# Patient Record
Sex: FEMALE | Race: WHITE | NOT HISPANIC OR LATINO | Employment: OTHER | URBAN - METROPOLITAN AREA
[De-identification: names, ages, dates, MRNs, and addresses within clinical notes are randomized per-mention and may not be internally consistent; named-entity substitution may affect disease eponyms.]

---

## 2019-06-12 ENCOUNTER — OFFICE VISIT (OUTPATIENT)
Dept: INTERNAL MEDICINE | Facility: CLINIC | Age: 54
End: 2019-06-12

## 2019-06-12 VITALS — HEIGHT: 66 IN | WEIGHT: 167 LBS | BODY MASS INDEX: 26.84 KG/M2

## 2019-06-12 DIAGNOSIS — L24.7 IRRITANT CONTACT DERMATITIS DUE TO PLANTS, EXCEPT FOOD: Primary | ICD-10-CM

## 2019-06-12 PROCEDURE — 99212 OFFICE O/P EST SF 10 MIN: CPT | Performed by: PHYSICIAN ASSISTANT

## 2019-06-12 RX ORDER — TRAZODONE HYDROCHLORIDE 50 MG/1
TABLET ORAL
Refills: 5 | COMMUNITY
Start: 2019-04-15 | End: 2019-07-22

## 2019-06-12 RX ORDER — BUPROPION HYDROCHLORIDE 300 MG/1
300 TABLET ORAL DAILY
Refills: 3 | COMMUNITY
Start: 2019-05-23 | End: 2019-10-23 | Stop reason: SDUPTHER

## 2019-06-12 RX ORDER — OMEPRAZOLE 20 MG/1
CAPSULE, DELAYED RELEASE ORAL
Refills: 5 | COMMUNITY
Start: 2019-05-23 | End: 2019-07-08 | Stop reason: SDUPTHER

## 2019-06-12 RX ORDER — AMLODIPINE BESYLATE 5 MG/1
TABLET ORAL
Refills: 5 | COMMUNITY
Start: 2019-05-23 | End: 2019-09-30 | Stop reason: SDUPTHER

## 2019-06-12 RX ORDER — METHYLPREDNISOLONE 4 MG/1
TABLET ORAL
Qty: 21 TABLET | Refills: 0 | Status: SHIPPED | OUTPATIENT
Start: 2019-06-12 | End: 2019-07-22

## 2019-06-12 RX ORDER — HYDROCHLOROTHIAZIDE 12.5 MG/1
CAPSULE, GELATIN COATED ORAL
Refills: 5 | COMMUNITY
Start: 2019-06-03 | End: 2019-07-22

## 2019-06-12 RX ORDER — TRIAMCINOLONE ACETONIDE 5 MG/G
OINTMENT TOPICAL 3 TIMES DAILY
Qty: 1 TUBE | Refills: 0 | Status: SHIPPED | OUTPATIENT
Start: 2019-06-12 | End: 2019-07-22

## 2019-06-12 NOTE — PROGRESS NOTES
Subjective   Chief Complaint   Patient presents with   • Rash     both legs       Pt here today with rash for the last few days lower legs, new spots popping up. Itching horribly, has used otc cortizone cream very little improvement. Was outside working in her yard a few days ago prior to rash erruption.           There is no problem list on file for this patient.      No Known Allergies    Current Outpatient Medications on File Prior to Visit   Medication Sig Dispense Refill   • amLODIPine (NORVASC) 5 MG tablet TK 1 T PO D UTD  5   • buPROPion XL (WELLBUTRIN XL) 300 MG 24 hr tablet Take 300 mg by mouth Daily.  3   • hydrochlorothiazide (MICROZIDE) 12.5 MG capsule TK 1 C PO QD  5   • omeprazole (priLOSEC) 20 MG capsule TK ONE C PO  QD  5   • traZODone (DESYREL) 50 MG tablet TK 1 TO 2 TS PO HS PRN  5     No current facility-administered medications on file prior to visit.        No past medical history on file.    No family history on file.    Social History     Socioeconomic History   • Marital status:      Spouse name: Not on file   • Number of children: Not on file   • Years of education: Not on file   • Highest education level: Not on file       No past surgical history on file.    PHQ-9 Depression Screening  Little interest or pleasure in doing things?     Feeling down, depressed, or hopeless?     Trouble falling or staying asleep, or sleeping too much?     Feeling tired or having little energy?     Poor appetite or overeating?     Feeling bad about yourself - or that you are a failure or have let yourself or your family down?     Trouble concentrating on things, such as reading the newspaper or watching television?     Moving or speaking so slowly that other people could have noticed? Or the opposite - being so fidgety or restless that you have been moving around a lot more than usual?     Thoughts that you would be better off dead, or of hurting yourself in some way?     PHQ-9 Total Score     If you  "checked off any problems, how difficult have these problems made it for you to do your work, take care of things at home, or get along with other people?       The following portions of the patient's history were reviewed and updated as appropriate: problem list, allergies, current medications and past medical history.    Review of Systems   Skin: Positive for rash.         There is no immunization history on file for this patient.    Objective   Vitals:    06/12/19 1444   Weight: 75.8 kg (167 lb)   Height: 167.6 cm (66\")     Physical Exam   Skin:   vessicular rash on anterior aspect of lower extremities bilaterally.         Assessment/Plan   Mariela was seen today for rash.    Diagnoses and all orders for this visit:    Irritant contact dermatitis due to plants, except food    Other orders  -     methylPREDNISolone (MEDROL, FIDENCIO,) 4 MG tablet; Take as directed on package instructions.  -     triamcinolone (KENALOG) 0.5 % ointment; Apply  topically to the appropriate area as directed 3 (Three) Times a Day.      Likely from poison ivy. Oral steroids and triamcinolone cream for area.          "

## 2019-07-08 RX ORDER — OMEPRAZOLE 20 MG/1
CAPSULE, DELAYED RELEASE ORAL
Qty: 30 CAPSULE | Refills: 0 | Status: SHIPPED | OUTPATIENT
Start: 2019-07-08 | End: 2019-08-12 | Stop reason: SDUPTHER

## 2019-07-22 ENCOUNTER — OFFICE VISIT (OUTPATIENT)
Dept: INTERNAL MEDICINE | Facility: CLINIC | Age: 54
End: 2019-07-22

## 2019-07-22 VITALS
HEIGHT: 66 IN | BODY MASS INDEX: 28.12 KG/M2 | SYSTOLIC BLOOD PRESSURE: 112 MMHG | HEART RATE: 51 BPM | DIASTOLIC BLOOD PRESSURE: 72 MMHG | TEMPERATURE: 97.9 F | WEIGHT: 175 LBS | RESPIRATION RATE: 15 BRPM

## 2019-07-22 DIAGNOSIS — D50.8 OTHER IRON DEFICIENCY ANEMIA: ICD-10-CM

## 2019-07-22 DIAGNOSIS — I10 ESSENTIAL HYPERTENSION: Primary | ICD-10-CM

## 2019-07-22 PROBLEM — D50.9 IRON DEFICIENCY ANEMIA: Status: ACTIVE | Noted: 2019-07-22

## 2019-07-22 LAB
ALBUMIN SERPL-MCNC: 4.3 G/DL (ref 3.5–5.2)
ALBUMIN/GLOB SERPL: 1.7 G/DL
ALP SERPL-CCNC: 32 U/L (ref 39–117)
ALT SERPL-CCNC: 19 U/L (ref 1–33)
AST SERPL-CCNC: 20 U/L (ref 1–32)
BASOPHILS # BLD AUTO: 0.04 10*3/MM3 (ref 0–0.2)
BASOPHILS NFR BLD AUTO: 0.7 % (ref 0–1.5)
BILIRUB SERPL-MCNC: 0.4 MG/DL (ref 0.2–1.2)
BUN SERPL-MCNC: 12 MG/DL (ref 6–20)
BUN/CREAT SERPL: 14.8 (ref 7–25)
CALCIUM SERPL-MCNC: 8.8 MG/DL (ref 8.6–10.5)
CHLORIDE SERPL-SCNC: 105 MMOL/L (ref 98–107)
CO2 SERPL-SCNC: 24.4 MMOL/L (ref 22–29)
CREAT SERPL-MCNC: 0.81 MG/DL (ref 0.57–1)
EOSINOPHIL # BLD AUTO: 0.05 10*3/MM3 (ref 0–0.4)
EOSINOPHIL NFR BLD AUTO: 0.8 % (ref 0.3–6.2)
ERYTHROCYTE [DISTWIDTH] IN BLOOD BY AUTOMATED COUNT: 12.4 % (ref 12.3–15.4)
GLOBULIN SER CALC-MCNC: 2.5 GM/DL
GLUCOSE SERPL-MCNC: 110 MG/DL (ref 65–99)
HCT VFR BLD AUTO: 41.4 % (ref 34–46.6)
HGB BLD-MCNC: 13.8 G/DL (ref 12–15.9)
IMM GRANULOCYTES # BLD AUTO: 0.03 10*3/MM3 (ref 0–0.05)
IMM GRANULOCYTES NFR BLD AUTO: 0.5 % (ref 0–0.5)
LYMPHOCYTES # BLD AUTO: 2.46 10*3/MM3 (ref 0.7–3.1)
LYMPHOCYTES NFR BLD AUTO: 40.9 % (ref 19.6–45.3)
MCH RBC QN AUTO: 30.7 PG (ref 26.6–33)
MCHC RBC AUTO-ENTMCNC: 33.3 G/DL (ref 31.5–35.7)
MCV RBC AUTO: 92.2 FL (ref 79–97)
MONOCYTES # BLD AUTO: 0.63 10*3/MM3 (ref 0.1–0.9)
MONOCYTES NFR BLD AUTO: 10.5 % (ref 5–12)
NEUTROPHILS # BLD AUTO: 2.81 10*3/MM3 (ref 1.7–7)
NEUTROPHILS NFR BLD AUTO: 46.6 % (ref 42.7–76)
NRBC BLD AUTO-RTO: 0 /100 WBC (ref 0–0.2)
PLATELET # BLD AUTO: 218 10*3/MM3 (ref 140–450)
POTASSIUM SERPL-SCNC: 4.4 MMOL/L (ref 3.5–5.2)
PROT SERPL-MCNC: 6.8 G/DL (ref 6–8.5)
RBC # BLD AUTO: 4.49 10*6/MM3 (ref 3.77–5.28)
SODIUM SERPL-SCNC: 141 MMOL/L (ref 136–145)
WBC # BLD AUTO: 6.02 10*3/MM3 (ref 3.4–10.8)

## 2019-07-22 PROCEDURE — 99213 OFFICE O/P EST LOW 20 MIN: CPT | Performed by: INTERNAL MEDICINE

## 2019-07-22 NOTE — PROGRESS NOTES
Subjective        Chief Complaint   Patient presents with   • Med Management           Mariela Polanco is a 53 y.o. female who presents for    Patient Active Problem List   Diagnosis   • Hypertension   • Iron deficiency anemia       History of Present Illness     Her BP has been /70s. She is doing well emotionally. She denies dizziness, chest pain or dyspnea. She is not exercising as much. She has not been traveling as she is working from home. Her BP has stayed about the same after running out of HCTZ 4-5 days ago.  No Known Allergies    Current Outpatient Medications on File Prior to Visit   Medication Sig Dispense Refill   • amLODIPine (NORVASC) 5 MG tablet TK 1 T PO D UTD  5   • buPROPion XL (WELLBUTRIN XL) 300 MG 24 hr tablet Take 300 mg by mouth Daily.  3   • omeprazole (priLOSEC) 20 MG capsule TAKE ONE CAPSULE BY MOUTH EVERY DAY 30 capsule 0   • [DISCONTINUED] hydrochlorothiazide (MICROZIDE) 12.5 MG capsule TK 1 C PO QD  5   • [DISCONTINUED] methylPREDNISolone (MEDROL, FIDENCIO,) 4 MG tablet Take as directed on package instructions. 21 tablet 0   • [DISCONTINUED] traZODone (DESYREL) 50 MG tablet TK 1 TO 2 TS PO HS PRN  5   • [DISCONTINUED] triamcinolone (KENALOG) 0.5 % ointment Apply  topically to the appropriate area as directed 3 (Three) Times a Day. 1 tube 0     No current facility-administered medications on file prior to visit.        Past Medical History:   Diagnosis Date   • Adrenal adenoma    • Anxiety    • Depression    • Elevated fasting glucose    • History of diverticulitis    • Hypertension    • Insomnia    • Iron deficiency anemia    • Menopausal symptoms    • Pneumonia    • Rosacea    • Steatosis, liver    • Vitamin D deficiency        Past Surgical History:   Procedure Laterality Date   •  SECTION     • CHOLECYSTECTOMY     • COLONOSCOPY      complete        Family History   Problem Relation Age of Onset   • Other Mother         ESRD (end stage renal disease)   • Heart failure  "Mother         chronic congestive heart failure   • Diabetes Mother    • Hyperlipidemia Mother    • Hypertension Mother    • Pneumonia Mother    • Skin cancer Mother    • Diverticulitis Father    • Skin cancer Father    • Diabetes Sister    • Diabetes Brother    • Other Maternal Aunt         malignant neoplasm of breast        Social History     Socioeconomic History   • Marital status:      Spouse name: Not on file   • Number of children: Not on file   • Years of education: Not on file   • Highest education level: Not on file   Tobacco Use   • Smoking status: Never Smoker   Substance and Sexual Activity   • Alcohol use: Yes     Frequency: 2-3 times a week     Drinks per session: 1 or 2           The following portions of the patient's history were reviewed and updated as appropriate: problem list, allergies, current medications, past medical history, past family history, past social history and past surgical history.    Review of Systems   Respiratory: Negative for shortness of breath.    Cardiovascular: Negative for chest pain.       Immunization History   Administered Date(s) Administered   • Flu Vaccine Intradermal Quad 18-64YR 11/27/2017, 11/12/2018   • Hepatitis A 12/19/2017, 11/12/2018   • Hepatitis B 03/01/2018, 05/11/2018, 12/10/2018   • Tdap 11/27/2017   • Typhoid, Unspecified 12/19/2017       Objective   Vitals:    07/22/19 0853   BP: 112/72   Pulse: 51   Resp: 15   Temp: 97.9 °F (36.6 °C)   TempSrc: Oral   Weight: 79.4 kg (175 lb)   Height: 167.6 cm (65.98\")     Physical Exam   Constitutional: She appears well-developed and well-nourished.   HENT:   Head: Normocephalic and atraumatic.   Cardiovascular: Normal rate, regular rhythm, S1 normal, S2 normal and normal heart sounds.   Pulmonary/Chest: Effort normal and breath sounds normal.   Neurological: She is alert.   Skin: Skin is warm.   Psychiatric: She has a normal mood and affect.   Vitals reviewed.      Procedures    Assessment/Plan   Mariela was " seen today for med management.    Diagnoses and all orders for this visit:    Essential hypertension  -     Comprehensive Metabolic Panel  -     Comprehensive Metabolic Panel; Future  -     TSH; Future  -     Lipid Panel With / Chol / HDL Ratio; Future  -     CBC & Differential; Future    Other iron deficiency anemia  -     CBC & Differential               BP is good; since she has been out of HCTZ for 5 days, I will keep her off of it. Encouraged her to r/s exercise.   Return in about 6 months (around 1/22/2020) for Annual physical.

## 2019-08-12 RX ORDER — OMEPRAZOLE 20 MG/1
CAPSULE, DELAYED RELEASE ORAL
Qty: 30 CAPSULE | Refills: 0 | Status: SHIPPED | OUTPATIENT
Start: 2019-08-12 | End: 2019-09-03 | Stop reason: SDUPTHER

## 2019-08-12 RX ORDER — OMEPRAZOLE 20 MG/1
CAPSULE, DELAYED RELEASE ORAL
Qty: 30 CAPSULE | Refills: 0 | Status: SHIPPED | OUTPATIENT
Start: 2019-08-12 | End: 2019-10-23 | Stop reason: SDUPTHER

## 2019-09-03 RX ORDER — OMEPRAZOLE 20 MG/1
CAPSULE, DELAYED RELEASE ORAL
Qty: 30 CAPSULE | Refills: 0 | Status: SHIPPED | OUTPATIENT
Start: 2019-09-03 | End: 2019-10-08 | Stop reason: SDUPTHER

## 2019-09-30 RX ORDER — AMLODIPINE BESYLATE 5 MG/1
5 TABLET ORAL DAILY
Qty: 30 TABLET | Refills: 6 | Status: SHIPPED | OUTPATIENT
Start: 2019-09-30 | End: 2020-04-20

## 2019-10-08 RX ORDER — OMEPRAZOLE 20 MG/1
CAPSULE, DELAYED RELEASE ORAL
Qty: 30 CAPSULE | Refills: 0 | Status: SHIPPED | OUTPATIENT
Start: 2019-10-08 | End: 2019-10-23 | Stop reason: SDUPTHER

## 2019-10-23 RX ORDER — BUPROPION HYDROCHLORIDE 300 MG/1
TABLET ORAL
Qty: 90 TABLET | Refills: 0 | Status: SHIPPED | OUTPATIENT
Start: 2019-10-23 | End: 2020-01-31 | Stop reason: SDUPTHER

## 2019-10-23 RX ORDER — OMEPRAZOLE 20 MG/1
CAPSULE, DELAYED RELEASE ORAL
Qty: 30 CAPSULE | Refills: 0 | Status: SHIPPED | OUTPATIENT
Start: 2019-10-23 | End: 2019-12-02 | Stop reason: SDUPTHER

## 2019-12-02 RX ORDER — OMEPRAZOLE 20 MG/1
20 CAPSULE, DELAYED RELEASE ORAL DAILY
Qty: 30 CAPSULE | Refills: 3 | Status: SHIPPED | OUTPATIENT
Start: 2019-12-02 | End: 2020-04-21

## 2020-01-16 ENCOUNTER — RESULTS ENCOUNTER (OUTPATIENT)
Dept: INTERNAL MEDICINE | Facility: CLINIC | Age: 55
End: 2020-01-16

## 2020-01-16 DIAGNOSIS — I10 ESSENTIAL HYPERTENSION: ICD-10-CM

## 2020-01-23 ENCOUNTER — OFFICE VISIT (OUTPATIENT)
Dept: INTERNAL MEDICINE | Facility: CLINIC | Age: 55
End: 2020-01-23

## 2020-01-23 VITALS
HEIGHT: 66 IN | WEIGHT: 178.8 LBS | SYSTOLIC BLOOD PRESSURE: 124 MMHG | BODY MASS INDEX: 28.73 KG/M2 | DIASTOLIC BLOOD PRESSURE: 82 MMHG

## 2020-01-23 DIAGNOSIS — D50.8 OTHER IRON DEFICIENCY ANEMIA: ICD-10-CM

## 2020-01-23 DIAGNOSIS — F32.5 MAJOR DEPRESSIVE DISORDER IN FULL REMISSION, UNSPECIFIED WHETHER RECURRENT (HCC): ICD-10-CM

## 2020-01-23 DIAGNOSIS — I10 ESSENTIAL HYPERTENSION: Primary | ICD-10-CM

## 2020-01-23 LAB
ALBUMIN SERPL-MCNC: 4.9 G/DL (ref 3.5–5.2)
ALBUMIN/GLOB SERPL: 2 G/DL
ALP SERPL-CCNC: 41 U/L (ref 39–117)
ALT SERPL-CCNC: 43 U/L (ref 1–33)
AST SERPL-CCNC: 26 U/L (ref 1–32)
BASOPHILS # BLD AUTO: 0.05 10*3/MM3 (ref 0–0.2)
BASOPHILS NFR BLD AUTO: 0.7 % (ref 0–1.5)
BILIRUB SERPL-MCNC: 0.4 MG/DL (ref 0.2–1.2)
BUN SERPL-MCNC: 13 MG/DL (ref 6–20)
BUN/CREAT SERPL: 14.9 (ref 7–25)
CALCIUM SERPL-MCNC: 9.7 MG/DL (ref 8.6–10.5)
CHLORIDE SERPL-SCNC: 103 MMOL/L (ref 98–107)
CHOLEST SERPL-MCNC: 176 MG/DL (ref 0–200)
CHOLEST/HDLC SERPL: 2.51 {RATIO}
CO2 SERPL-SCNC: 26.2 MMOL/L (ref 22–29)
CREAT SERPL-MCNC: 0.87 MG/DL (ref 0.57–1)
EOSINOPHIL # BLD AUTO: 0.06 10*3/MM3 (ref 0–0.4)
EOSINOPHIL NFR BLD AUTO: 0.8 % (ref 0.3–6.2)
ERYTHROCYTE [DISTWIDTH] IN BLOOD BY AUTOMATED COUNT: 12.4 % (ref 12.3–15.4)
GLOBULIN SER CALC-MCNC: 2.5 GM/DL
GLUCOSE SERPL-MCNC: 107 MG/DL (ref 65–99)
HCT VFR BLD AUTO: 40.9 % (ref 34–46.6)
HDLC SERPL-MCNC: 70 MG/DL (ref 40–60)
HGB BLD-MCNC: 14.3 G/DL (ref 12–15.9)
IMM GRANULOCYTES # BLD AUTO: 0.03 10*3/MM3 (ref 0–0.05)
IMM GRANULOCYTES NFR BLD AUTO: 0.4 % (ref 0–0.5)
LDLC SERPL CALC-MCNC: 91 MG/DL (ref 0–100)
LYMPHOCYTES # BLD AUTO: 2.34 10*3/MM3 (ref 0.7–3.1)
LYMPHOCYTES NFR BLD AUTO: 32.6 % (ref 19.6–45.3)
MCH RBC QN AUTO: 31.3 PG (ref 26.6–33)
MCHC RBC AUTO-ENTMCNC: 35 G/DL (ref 31.5–35.7)
MCV RBC AUTO: 89.5 FL (ref 79–97)
MONOCYTES # BLD AUTO: 0.65 10*3/MM3 (ref 0.1–0.9)
MONOCYTES NFR BLD AUTO: 9.1 % (ref 5–12)
NEUTROPHILS # BLD AUTO: 4.04 10*3/MM3 (ref 1.7–7)
NEUTROPHILS NFR BLD AUTO: 56.4 % (ref 42.7–76)
NRBC BLD AUTO-RTO: 0 /100 WBC (ref 0–0.2)
PLATELET # BLD AUTO: 226 10*3/MM3 (ref 140–450)
POTASSIUM SERPL-SCNC: 4.4 MMOL/L (ref 3.5–5.2)
PROT SERPL-MCNC: 7.4 G/DL (ref 6–8.5)
RBC # BLD AUTO: 4.57 10*6/MM3 (ref 3.77–5.28)
SODIUM SERPL-SCNC: 140 MMOL/L (ref 136–145)
TRIGL SERPL-MCNC: 74 MG/DL (ref 0–150)
TSH SERPL DL<=0.005 MIU/L-ACNC: 0.95 UIU/ML (ref 0.27–4.2)
VLDLC SERPL CALC-MCNC: 14.8 MG/DL
WBC # BLD AUTO: 7.17 10*3/MM3 (ref 3.4–10.8)

## 2020-01-23 PROCEDURE — 99213 OFFICE O/P EST LOW 20 MIN: CPT | Performed by: INTERNAL MEDICINE

## 2020-01-23 RX ORDER — TRAZODONE HYDROCHLORIDE 50 MG/1
TABLET ORAL
COMMUNITY
Start: 2019-12-29 | End: 2020-01-30 | Stop reason: SDUPTHER

## 2020-01-23 NOTE — PROGRESS NOTES
Subjective        Chief Complaint   Patient presents with   • Hypertension     6 month fup bp leonel Polanco is a 54 y.o. female who presents for    Patient Active Problem List   Diagnosis   • Hypertension   • Iron deficiency anemia   • Depression       History of Present Illness     Her BP has been 115-118/65-75. She denies melena, hematochezia, chest pain, dyspnea, nausea or abdominal pain. She is stable emotionally. She is working about 40 hours per week. She does not exercise much. She has been off of iron for about a year.  Allergies   Allergen Reactions   • Penicillins Other (See Comments)     other       Current Outpatient Medications on File Prior to Visit   Medication Sig Dispense Refill   • amLODIPine (NORVASC) 5 MG tablet Take 1 tablet by mouth Daily. 30 tablet 6   • buPROPion XL (WELLBUTRIN XL) 300 MG 24 hr tablet TAKE 1 TABLET BY MOUTH DAILY 90 tablet 0   • omeprazole (priLOSEC) 20 MG capsule Take 1 capsule by mouth Daily. 30 capsule 3   • traZODone (DESYREL) 50 MG tablet TK 1 TO 2 TS PO HS PRN       No current facility-administered medications on file prior to visit.        Past Medical History:   Diagnosis Date   • Adrenal adenoma    • Anxiety    • Depression    • Elevated fasting glucose    • History of diverticulitis    • Hypertension    • Insomnia    • Iron deficiency anemia    • Menopausal symptoms    • Pneumonia    • Rosacea    • Steatosis, liver    • Vitamin D deficiency        Past Surgical History:   Procedure Laterality Date   •  SECTION     • CHOLECYSTECTOMY     • COLONOSCOPY  10/28/2016    nl with Dr. Everett Botello       Family History   Problem Relation Age of Onset   • Other Mother         ESRD (end stage renal disease)   • Heart failure Mother         chronic congestive heart failure   • Diabetes Mother    • Hyperlipidemia Mother    • Hypertension Mother    • Pneumonia Mother    • Skin cancer Mother    • Diverticulitis Father    • Skin cancer Father    •  "Diabetes Sister    • Diabetes Brother    • Other Maternal Aunt         malignant neoplasm of breast        Social History     Socioeconomic History   • Marital status:      Spouse name: Not on file   • Number of children: Not on file   • Years of education: Not on file   • Highest education level: Not on file   Tobacco Use   • Smoking status: Never Smoker   • Smokeless tobacco: Never Used   Substance and Sexual Activity   • Alcohol use: Yes     Frequency: 2-3 times a week     Drinks per session: 1 or 2     Comment: social   • Drug use: Never   • Sexual activity: Defer           The following portions of the patient's history were reviewed and updated as appropriate: problem list, allergies, current medications, past medical history, past family history, past social history and past surgical history.    Review of Systems   Respiratory: Negative for shortness of breath.    Cardiovascular: Negative for chest pain.       Immunization History   Administered Date(s) Administered   • FLUARIX/FLUZONE/AFLURIA/FLULAVAL QUAD 12/27/2019   • Flu Vaccine Intradermal Quad 18-64YR 11/27/2017, 11/12/2018   • Hepatitis A 12/19/2017, 11/12/2018   • Hepatitis B 03/01/2018, 05/11/2018, 12/10/2018   • Tdap 11/27/2017   • Typhoid, Unspecified 12/19/2017       Objective   Vitals:    01/23/20 0841   BP: 124/82   Weight: 81.1 kg (178 lb 12.8 oz)   Height: 167.6 cm (66\")     Body mass index is 28.86 kg/m².  Physical Exam   Constitutional: She appears well-developed and well-nourished.   HENT:   Head: Normocephalic and atraumatic.   Cardiovascular: Normal rate, regular rhythm, S1 normal, S2 normal and normal heart sounds.   Pulmonary/Chest: Effort normal and breath sounds normal.   Neurological: She is alert.   Skin: Skin is warm.   Psychiatric: She has a normal mood and affect.   Vitals reviewed.      Procedures    Assessment/Plan   Mariela was seen today for hypertension.    Diagnoses and all orders for this visit:    Essential " hypertension  Comments:  BP is good    Other iron deficiency anemia  Comments:  Labs today including CBC    Major depressive disorder in full remission, unspecified whether recurrent (CMS/[facility])  Comments:  Doing well               Labs today.  Return in about 6 months (around 7/23/2020) for Annual physical.

## 2020-01-27 DIAGNOSIS — R74.8 ELEVATED LIVER ENZYMES: Primary | ICD-10-CM

## 2020-01-30 DIAGNOSIS — G47.00 INSOMNIA, UNSPECIFIED TYPE: Primary | ICD-10-CM

## 2020-01-30 RX ORDER — TRAZODONE HYDROCHLORIDE 50 MG/1
50 TABLET ORAL NIGHTLY
Qty: 60 TABLET | Refills: 1 | Status: SHIPPED | OUTPATIENT
Start: 2020-01-30 | End: 2020-05-20

## 2020-01-31 DIAGNOSIS — F32.5 MAJOR DEPRESSIVE DISORDER IN FULL REMISSION, UNSPECIFIED WHETHER RECURRENT (HCC): Primary | ICD-10-CM

## 2020-01-31 RX ORDER — BUPROPION HYDROCHLORIDE 300 MG/1
300 TABLET ORAL DAILY
Qty: 90 TABLET | Refills: 1 | Status: SHIPPED | OUTPATIENT
Start: 2020-01-31 | End: 2020-07-13

## 2020-04-19 DIAGNOSIS — I10 ESSENTIAL HYPERTENSION: Primary | ICD-10-CM

## 2020-04-20 RX ORDER — AMLODIPINE BESYLATE 5 MG/1
5 TABLET ORAL DAILY
Qty: 30 TABLET | Refills: 6 | Status: SHIPPED | OUTPATIENT
Start: 2020-04-20 | End: 2020-11-09

## 2020-04-21 DIAGNOSIS — K21.9 GASTROESOPHAGEAL REFLUX DISEASE, ESOPHAGITIS PRESENCE NOT SPECIFIED: Primary | ICD-10-CM

## 2020-04-21 RX ORDER — OMEPRAZOLE 20 MG/1
CAPSULE, DELAYED RELEASE ORAL
Qty: 30 CAPSULE | Refills: 3 | Status: SHIPPED | OUTPATIENT
Start: 2020-04-21 | End: 2020-07-23

## 2020-05-19 DIAGNOSIS — G47.00 INSOMNIA, UNSPECIFIED TYPE: ICD-10-CM

## 2020-05-20 RX ORDER — TRAZODONE HYDROCHLORIDE 50 MG/1
50 TABLET ORAL NIGHTLY
Qty: 60 TABLET | Refills: 1 | Status: SHIPPED | OUTPATIENT
Start: 2020-05-20 | End: 2020-09-14

## 2020-07-11 DIAGNOSIS — F32.5 MAJOR DEPRESSIVE DISORDER IN FULL REMISSION, UNSPECIFIED WHETHER RECURRENT (HCC): ICD-10-CM

## 2020-07-13 RX ORDER — BUPROPION HYDROCHLORIDE 300 MG/1
300 TABLET ORAL DAILY
Qty: 90 TABLET | Refills: 1 | Status: SHIPPED | OUTPATIENT
Start: 2020-07-13 | End: 2021-01-04

## 2020-07-16 ENCOUNTER — RESULTS ENCOUNTER (OUTPATIENT)
Dept: INTERNAL MEDICINE | Facility: CLINIC | Age: 55
End: 2020-07-16

## 2020-07-16 DIAGNOSIS — R74.8 ELEVATED LIVER ENZYMES: ICD-10-CM

## 2020-07-16 LAB
ALBUMIN SERPL-MCNC: 4.8 G/DL (ref 3.5–5.2)
ALP SERPL-CCNC: 47 U/L (ref 39–117)
ALT SERPL-CCNC: 21 U/L (ref 1–33)
AST SERPL-CCNC: 19 U/L (ref 1–32)
BILIRUB DIRECT SERPL-MCNC: <0.2 MG/DL (ref 0–0.3)
BILIRUB SERPL-MCNC: 0.3 MG/DL (ref 0–1.2)
PROT SERPL-MCNC: 7.2 G/DL (ref 6–8.5)

## 2020-07-23 ENCOUNTER — OFFICE VISIT (OUTPATIENT)
Dept: INTERNAL MEDICINE | Facility: CLINIC | Age: 55
End: 2020-07-23

## 2020-07-23 VITALS
BODY MASS INDEX: 28.8 KG/M2 | HEIGHT: 66 IN | DIASTOLIC BLOOD PRESSURE: 82 MMHG | SYSTOLIC BLOOD PRESSURE: 112 MMHG | WEIGHT: 179.2 LBS | TEMPERATURE: 97.7 F

## 2020-07-23 DIAGNOSIS — Z00.00 WELLNESS EXAMINATION: Primary | ICD-10-CM

## 2020-07-23 DIAGNOSIS — F32.5 MAJOR DEPRESSIVE DISORDER IN FULL REMISSION, UNSPECIFIED WHETHER RECURRENT (HCC): ICD-10-CM

## 2020-07-23 DIAGNOSIS — Z11.59 NEED FOR HEPATITIS C SCREENING TEST: ICD-10-CM

## 2020-07-23 DIAGNOSIS — Z12.39 BREAST CANCER SCREENING: ICD-10-CM

## 2020-07-23 DIAGNOSIS — I10 ESSENTIAL HYPERTENSION: ICD-10-CM

## 2020-07-23 DIAGNOSIS — K21.9 GASTROESOPHAGEAL REFLUX DISEASE WITHOUT ESOPHAGITIS: ICD-10-CM

## 2020-07-23 PROCEDURE — 99396 PREV VISIT EST AGE 40-64: CPT | Performed by: INTERNAL MEDICINE

## 2020-07-23 RX ORDER — OMEPRAZOLE 40 MG/1
40 CAPSULE, DELAYED RELEASE ORAL DAILY
Qty: 30 CAPSULE | Refills: 4 | Status: SHIPPED | OUTPATIENT
Start: 2020-07-23 | End: 2020-12-04

## 2020-07-23 NOTE — PROGRESS NOTES
Subjective        Chief Complaint   Patient presents with   • Hypertension   • Annual Exam           Mariela Polanco is a 54 y.o. female who presents for    Patient Active Problem List   Diagnosis   • Hypertension   • Iron deficiency anemia   • Depression   • Wellness examination   • Gastroesophageal reflux disease without esophagitis       History of Present Illness     She has been checking her BP and it runs 120/80. She is doing well emotionally. She does not have burning in her throat. She has taste of reflux in her mouth 1-2 times per week. She is only taking 20 mg of Prilosec. She denies dysphagia. She does not eat near bedtime. She has a cup of coffee in am. She has a glass of wine once per week. She denies chest pain, dyspnea, melena or hematochezia.  Allergies   Allergen Reactions   • Penicillins Other (See Comments)     other       Current Outpatient Medications on File Prior to Visit   Medication Sig Dispense Refill   • amLODIPine (NORVASC) 5 MG tablet TAKE 1 TABLET BY MOUTH DAILY 30 tablet 6   • buPROPion XL (WELLBUTRIN XL) 300 MG 24 hr tablet TAKE 1 TABLET BY MOUTH DAILY 90 tablet 1   • traZODone (DESYREL) 50 MG tablet TAKE 1 TABLET BY MOUTH EVERY NIGHT 60 tablet 1   • [DISCONTINUED] omeprazole (priLOSEC) 20 MG capsule TAKE ONE CAPSULE BY MOUTH EVERY DAY 30 capsule 3     No current facility-administered medications on file prior to visit.        Past Medical History:   Diagnosis Date   • Adrenal adenoma    • Anxiety    • Depression    • Elevated fasting glucose    • History of diverticulitis    • Hypertension    • Insomnia    • Iron deficiency anemia    • Menopausal symptoms    • Pneumonia    • Rosacea    • Steatosis, liver    • Vitamin D deficiency        Past Surgical History:   Procedure Laterality Date   •  SECTION     • CHOLECYSTECTOMY     • COLONOSCOPY  10/28/2016    nl with Dr. Everett Botello       Family History   Problem Relation Age of Onset   • Other Mother         ESRD (end stage  renal disease)   • Heart failure Mother         chronic congestive heart failure   • Diabetes Mother    • Hyperlipidemia Mother    • Hypertension Mother    • Pneumonia Mother    • Skin cancer Mother    • Diverticulitis Father    • Skin cancer Father    • Diabetes Sister    • Diabetes Brother    • Other Maternal Aunt         malignant neoplasm of breast        Social History     Socioeconomic History   • Marital status:      Spouse name: Not on file   • Number of children: Not on file   • Years of education: Not on file   • Highest education level: Not on file   Tobacco Use   • Smoking status: Never Smoker   • Smokeless tobacco: Never Used   Substance and Sexual Activity   • Alcohol use: Yes     Frequency: 2-3 times a week     Drinks per session: 1 or 2     Comment: social   • Drug use: Never   • Sexual activity: Defer           The following portions of the patient's history were reviewed and updated as appropriate: problem list, allergies, current medications, past medical history, past family history, past social history and past surgical history.    Review of Systems   Constitutional: Negative for chills, fever and unexpected weight loss.   HENT: Negative for postnasal drip and sore throat.    Eyes: Negative for blurred vision.   Respiratory: Negative for cough, shortness of breath and wheezing.    Cardiovascular: Negative for chest pain and leg swelling.   Gastrointestinal: Positive for GERD. Negative for abdominal pain, blood in stool, nausea and vomiting.   Endocrine: Negative for polyuria.   Genitourinary: Negative for dysuria, frequency and hematuria.   Musculoskeletal: Negative for gait problem.   Skin: Negative for rash.   Allergic/Immunologic: Negative for immunocompromised state.   Neurological: Negative for weakness.   Hematological: Does not bruise/bleed easily.   Psychiatric/Behavioral: Negative for depressed mood. The patient is not nervous/anxious.        Immunization History   Administered  "Date(s) Administered   • FLUARIX/FLUZONE/AFLURIA/FLULAVAL QUAD 12/27/2019   • Flu Vaccine Intradermal Quad 18-64YR 11/27/2017, 11/12/2018   • Hepatitis A 12/19/2017, 11/12/2018   • Hepatitis B 03/01/2018, 05/11/2018, 12/10/2018   • Tdap 11/27/2017   • Typhoid, Unspecified 12/19/2017       Objective   Vitals:    07/23/20 0813   BP: 112/82   Temp: 97.7 °F (36.5 °C)   Weight: 81.3 kg (179 lb 3.2 oz)   Height: 167.6 cm (66\")     Body mass index is 28.92 kg/m².  Physical Exam   Constitutional: She appears well-developed and well-nourished.   HENT:   Head: Normocephalic and atraumatic.   Mouth/Throat: Oropharynx is clear and moist.   Eyes: Pupils are equal, round, and reactive to light. Conjunctivae and EOM are normal.   Neck: Neck supple. Carotid bruit is not present. No thyromegaly present.   Cardiovascular: Normal rate, regular rhythm and normal heart sounds.   No murmur heard.  Pulmonary/Chest: Effort normal and breath sounds normal.   Abdominal: Soft. She exhibits no distension and no mass. There is no tenderness. There is no rebound.   Lymphadenopathy:     She has no cervical adenopathy.   Neurological: She is alert.   Skin: Skin is warm.   Psychiatric: She has a normal mood and affect.   Vitals reviewed.      Procedures    Assessment/Plan   Mariela was seen today for hypertension and annual exam.    Diagnoses and all orders for this visit:    Wellness examination    Major depressive disorder in full remission, unspecified whether recurrent (CMS/HCC)    Essential hypertension  -     Comprehensive Metabolic Panel; Future  -     Lipid Panel With / Chol / HDL Ratio; Future  -     CBC & Differential; Future    Breast cancer screening  -     Mammo Screening Bilateral With CAD    Gastroesophageal reflux disease without esophagitis  -     omeprazole (priLOSEC) 40 MG capsule; Take 1 capsule by mouth Daily.    Need for hepatitis C screening test  -     Hepatitis C Antibody; Future               Discussed exercising 150 minutes " per week. Labs reviewed. Tip is UTD. She had a nl pap at our old practice in 2018. Increase Prilosec to 40 mg daily and she will let me know if her symptoms are not better in 6 weeks.  She is doing well emotionally.  Return in about 6 months (around 1/23/2021).

## 2020-08-11 ENCOUNTER — HOSPITAL ENCOUNTER (OUTPATIENT)
Dept: MAMMOGRAPHY | Facility: HOSPITAL | Age: 55
Discharge: HOME | End: 2020-08-11
Admitting: INTERNAL MEDICINE

## 2020-08-11 PROCEDURE — 77063 BREAST TOMOSYNTHESIS BI: CPT

## 2020-08-11 PROCEDURE — 77067 SCR MAMMO BI INCL CAD: CPT

## 2020-09-12 DIAGNOSIS — G47.00 INSOMNIA, UNSPECIFIED TYPE: ICD-10-CM

## 2020-09-14 RX ORDER — TRAZODONE HYDROCHLORIDE 50 MG/1
50 TABLET ORAL NIGHTLY
Qty: 60 TABLET | Refills: 1 | Status: SHIPPED | OUTPATIENT
Start: 2020-09-14 | End: 2021-01-04

## 2020-11-08 DIAGNOSIS — I10 ESSENTIAL HYPERTENSION: ICD-10-CM

## 2020-11-09 RX ORDER — AMLODIPINE BESYLATE 5 MG/1
5 TABLET ORAL DAILY
Qty: 30 TABLET | Refills: 6 | Status: SHIPPED | OUTPATIENT
Start: 2020-11-09 | End: 2021-06-15

## 2020-12-04 DIAGNOSIS — K21.9 GASTROESOPHAGEAL REFLUX DISEASE WITHOUT ESOPHAGITIS: ICD-10-CM

## 2020-12-04 RX ORDER — OMEPRAZOLE 40 MG/1
40 CAPSULE, DELAYED RELEASE ORAL DAILY
Qty: 30 CAPSULE | Refills: 4 | Status: SHIPPED | OUTPATIENT
Start: 2020-12-04 | End: 2021-05-11

## 2021-01-02 DIAGNOSIS — F32.5 MAJOR DEPRESSIVE DISORDER IN FULL REMISSION, UNSPECIFIED WHETHER RECURRENT (HCC): ICD-10-CM

## 2021-01-02 DIAGNOSIS — G47.00 INSOMNIA, UNSPECIFIED TYPE: ICD-10-CM

## 2021-01-04 RX ORDER — TRAZODONE HYDROCHLORIDE 50 MG/1
50 TABLET ORAL NIGHTLY
Qty: 60 TABLET | Refills: 1 | Status: SHIPPED | OUTPATIENT
Start: 2021-01-04 | End: 2021-05-11

## 2021-01-04 RX ORDER — BUPROPION HYDROCHLORIDE 300 MG/1
300 TABLET ORAL DAILY
Qty: 90 TABLET | Refills: 1 | Status: SHIPPED | OUTPATIENT
Start: 2021-01-04 | End: 2021-07-26

## 2021-01-15 DIAGNOSIS — I10 ESSENTIAL HYPERTENSION: ICD-10-CM

## 2021-01-15 DIAGNOSIS — Z11.59 NEED FOR HEPATITIS C SCREENING TEST: ICD-10-CM

## 2021-01-19 LAB
ALBUMIN SERPL-MCNC: 4.7 G/DL (ref 3.5–5.2)
ALBUMIN/GLOB SERPL: 1.8 G/DL
ALP SERPL-CCNC: 50 U/L (ref 39–117)
ALT SERPL-CCNC: 25 U/L (ref 1–33)
AST SERPL-CCNC: 25 U/L (ref 1–32)
BASOPHILS # BLD AUTO: NORMAL 10*3/UL
BASOPHILS # BLD MANUAL: 0.15 10*3/MM3 (ref 0–0.2)
BASOPHILS NFR BLD MANUAL: 2.1 % (ref 0–1.5)
BILIRUB SERPL-MCNC: 0.4 MG/DL (ref 0–1.2)
BUN SERPL-MCNC: 15 MG/DL (ref 6–20)
BUN/CREAT SERPL: 18.5 (ref 7–25)
CALCIUM SERPL-MCNC: 9.4 MG/DL (ref 8.6–10.5)
CHLORIDE SERPL-SCNC: 103 MMOL/L (ref 98–107)
CHOLEST SERPL-MCNC: 189 MG/DL (ref 0–200)
CHOLEST/HDLC SERPL: 3.32 {RATIO}
CO2 SERPL-SCNC: 24.8 MMOL/L (ref 22–29)
CREAT SERPL-MCNC: 0.81 MG/DL (ref 0.57–1)
DIFFERENTIAL COMMENT: ABNORMAL
EOSINOPHIL # BLD AUTO: NORMAL 10*3/UL
EOSINOPHIL NFR BLD AUTO: NORMAL %
ERYTHROCYTE [DISTWIDTH] IN BLOOD BY AUTOMATED COUNT: 12.4 % (ref 12.3–15.4)
GLOBULIN SER CALC-MCNC: 2.6 GM/DL
GLUCOSE SERPL-MCNC: 115 MG/DL (ref 65–99)
HCT VFR BLD AUTO: 40.5 % (ref 34–46.6)
HCV AB S/CO SERPL IA: <0.1 S/CO RATIO (ref 0–0.9)
HDLC SERPL-MCNC: 57 MG/DL (ref 40–60)
HGB BLD-MCNC: 13.9 G/DL (ref 12–15.9)
LDLC SERPL CALC-MCNC: 106 MG/DL (ref 0–100)
LYMPHOCYTES # BLD AUTO: NORMAL 10*3/UL
LYMPHOCYTES # BLD MANUAL: 1.74 10*3/MM3 (ref 0.7–3.1)
LYMPHOCYTES NFR BLD AUTO: NORMAL %
LYMPHOCYTES NFR BLD MANUAL: 24.2 % (ref 19.6–45.3)
MCH RBC QN AUTO: 30.9 PG (ref 26.6–33)
MCHC RBC AUTO-ENTMCNC: 34.3 G/DL (ref 31.5–35.7)
MCV RBC AUTO: 90 FL (ref 79–97)
MONOCYTES # BLD MANUAL: 0.68 10*3/MM3 (ref 0.1–0.9)
MONOCYTES NFR BLD AUTO: NORMAL %
MONOCYTES NFR BLD MANUAL: 9.5 % (ref 5–12)
NEUTROPHILS # BLD MANUAL: 4.63 10*3/MM3 (ref 1.7–7)
NEUTROPHILS NFR BLD AUTO: NORMAL %
NEUTROPHILS NFR BLD MANUAL: 64.2 % (ref 42.7–76)
PLATELET # BLD AUTO: 264 10*3/MM3 (ref 140–450)
PLATELET BLD QL SMEAR: ABNORMAL
POTASSIUM SERPL-SCNC: 4.2 MMOL/L (ref 3.5–5.2)
PROT SERPL-MCNC: 7.3 G/DL (ref 6–8.5)
RBC # BLD AUTO: 4.5 10*6/MM3 (ref 3.77–5.28)
RBC MORPH BLD: ABNORMAL
SODIUM SERPL-SCNC: 140 MMOL/L (ref 136–145)
TRIGL SERPL-MCNC: 150 MG/DL (ref 0–150)
VLDLC SERPL CALC-MCNC: 26 MG/DL (ref 5–40)
WBC # BLD AUTO: 7.21 10*3/MM3 (ref 3.4–10.8)

## 2021-02-15 ENCOUNTER — OFFICE VISIT (OUTPATIENT)
Dept: INTERNAL MEDICINE | Facility: CLINIC | Age: 56
End: 2021-02-15

## 2021-02-15 VITALS
WEIGHT: 178 LBS | TEMPERATURE: 97.8 F | HEIGHT: 66 IN | SYSTOLIC BLOOD PRESSURE: 122 MMHG | BODY MASS INDEX: 28.61 KG/M2 | DIASTOLIC BLOOD PRESSURE: 72 MMHG

## 2021-02-15 DIAGNOSIS — R73.9 HYPERGLYCEMIA: ICD-10-CM

## 2021-02-15 DIAGNOSIS — F32.89 OTHER DEPRESSION: ICD-10-CM

## 2021-02-15 DIAGNOSIS — K21.9 GASTROESOPHAGEAL REFLUX DISEASE WITHOUT ESOPHAGITIS: Primary | Chronic | ICD-10-CM

## 2021-02-15 DIAGNOSIS — I10 ESSENTIAL HYPERTENSION: Chronic | ICD-10-CM

## 2021-02-15 PROCEDURE — 99214 OFFICE O/P EST MOD 30 MIN: CPT | Performed by: INTERNAL MEDICINE

## 2021-02-15 NOTE — PROGRESS NOTES
Subjective        Chief Complaint   Patient presents with   • Hypertension           Mariela Polanco is a 55 y.o. female who presents for    Patient Active Problem List   Diagnosis   • Essential hypertension   • Iron deficiency anemia   • Depression   • Gastroesophageal reflux disease without esophagitis       History of Present Illness     Her BP has been 116/??  She denies chest pain or dyspnea. She is doing well emotionally. Her reflux is some better with increased Prilosec. Her throat will be scratchy sometimes when she wakes up if she has had wine. She denies dysphagia.   Allergies   Allergen Reactions   • Penicillins Other (See Comments)     other       Current Outpatient Medications on File Prior to Visit   Medication Sig Dispense Refill   • amLODIPine (NORVASC) 5 MG tablet TAKE 1 TABLET BY MOUTH DAILY 30 tablet 6   • buPROPion XL (WELLBUTRIN XL) 300 MG 24 hr tablet TAKE 1 TABLET BY MOUTH DAILY 90 tablet 1   • omeprazole (priLOSEC) 40 MG capsule TAKE 1 CAPSULE BY MOUTH DAILY 30 capsule 4   • traZODone (DESYREL) 50 MG tablet TAKE 1 TABLET BY MOUTH EVERY NIGHT 60 tablet 1     No current facility-administered medications on file prior to visit.        Past Medical History:   Diagnosis Date   • Adrenal adenoma    • Anxiety    • Depression    • Elevated fasting glucose    • History of diverticulitis    • Hypertension    • Insomnia    • Iron deficiency anemia    • Menopausal symptoms    • Pneumonia    • Rosacea    • Steatosis, liver    • Vitamin D deficiency        Past Surgical History:   Procedure Laterality Date   •  SECTION     • CHOLECYSTECTOMY     • COLONOSCOPY  10/28/2016    nl with Dr. Everett Botello       Family History   Problem Relation Age of Onset   • Other Mother         ESRD (end stage renal disease)   • Heart failure Mother         chronic congestive heart failure   • Diabetes Mother    • Hyperlipidemia Mother    • Hypertension Mother    • Pneumonia Mother    • Skin cancer Mother    •  "Diverticulitis Father    • Skin cancer Father    • Diabetes Sister    • Diabetes Brother    • Other Maternal Aunt         malignant neoplasm of breast    • Breast cancer Neg Hx        Social History     Socioeconomic History   • Marital status:      Spouse name: Not on file   • Number of children: Not on file   • Years of education: Not on file   • Highest education level: Not on file   Tobacco Use   • Smoking status: Never Smoker   • Smokeless tobacco: Never Used   Substance and Sexual Activity   • Alcohol use: Yes     Frequency: 2-3 times a week     Drinks per session: 1 or 2     Comment: social   • Drug use: Never   • Sexual activity: Defer           The following portions of the patient's history were reviewed and updated as appropriate: problem list, allergies, current medications, past medical history, past family history, past social history and past surgical history.    Review of Systems    Immunization History   Administered Date(s) Administered   • Flu Vaccine Intradermal Quad 18-64YR 11/27/2017, 11/12/2018, 09/15/2020   • Flulaval/Fluarix/Fluzone Quad 12/27/2019   • Hepatitis A 12/19/2017, 11/12/2018   • Hepatitis B 03/01/2018, 05/11/2018, 12/10/2018   • Tdap 11/27/2017   • Typhoid, Unspecified 12/19/2017       Objective   Vitals:    02/15/21 1125   BP: 122/72   Temp: 97.8 °F (36.6 °C)   Weight: 80.7 kg (178 lb)   Height: 167.6 cm (66\")     Body mass index is 28.73 kg/m².  Physical Exam  Vitals signs reviewed.   Constitutional:       Appearance: She is well-developed.   HENT:      Head: Normocephalic and atraumatic.   Cardiovascular:      Rate and Rhythm: Normal rate and regular rhythm.      Heart sounds: Normal heart sounds, S1 normal and S2 normal.   Pulmonary:      Effort: Pulmonary effort is normal.      Breath sounds: Normal breath sounds.   Abdominal:      Palpations: Abdomen is soft. There is no mass.      Tenderness: There is no abdominal tenderness.   Skin:     General: Skin is warm. "   Neurological:      Mental Status: She is alert.   Psychiatric:         Behavior: Behavior normal.         Procedures    Assessment/Plan   Diagnoses and all orders for this visit:    1. Gastroesophageal reflux disease without esophagitis (Primary)  -     Ambulatory referral for Screening EGD    2. Essential hypertension    3. Other depression    4. Hyperglycemia  -     Hemoglobin A1c; Future               Reviewed cmp, flp, and cbc. Set up an EGD. BP is good. Stable emotionally. Get in with Cindy for gyn questions.  Return in about 6 months (around 8/15/2021) for Annual physical, Lab Before FUP.

## 2021-03-14 NOTE — PROGRESS NOTES
Subjective   Chief Complaint   Patient presents with   • Dyspareunia       History of Present Illness   55 y.o. female presents with cc dyspareunia; she is a patient of Dr. Rolbes.     Dysparenuia for several months. Feels like sandpaper and sometimes feels a sharp pain in her vagina. Denies dryness, itching, or burning. Denies new patters or breakthrough bleeding. No GYN surgeries. M1. +FH uterine cancer.     MMG UTD 2020. Negative FH BRCA.       Patient Active Problem List   Diagnosis   • Essential hypertension   • Iron deficiency anemia   • Depression   • Gastroesophageal reflux disease without esophagitis       Allergies   Allergen Reactions   • Penicillins Other (See Comments)     other       Current Outpatient Medications on File Prior to Visit   Medication Sig Dispense Refill   • amLODIPine (NORVASC) 5 MG tablet TAKE 1 TABLET BY MOUTH DAILY 30 tablet 6   • buPROPion XL (WELLBUTRIN XL) 300 MG 24 hr tablet TAKE 1 TABLET BY MOUTH DAILY 90 tablet 1   • omeprazole (priLOSEC) 40 MG capsule TAKE 1 CAPSULE BY MOUTH DAILY 30 capsule 4   • traZODone (DESYREL) 50 MG tablet TAKE 1 TABLET BY MOUTH EVERY NIGHT 60 tablet 1     No current facility-administered medications on file prior to visit.       Past Medical History:   Diagnosis Date   • Adrenal adenoma    • Anxiety    • Depression    • Elevated fasting glucose    • History of diverticulitis    • Insomnia    • Iron deficiency anemia    • Menopausal symptoms    • Pneumonia    • Rosacea    • Steatosis, liver    • Vitamin D deficiency        Family History   Problem Relation Age of Onset   • Other Mother         ESRD (end stage renal disease)   • Heart failure Mother         chronic congestive heart failure   • Diabetes Mother    • Hyperlipidemia Mother    • Hypertension Mother    • Pneumonia Mother    • Skin cancer Mother    • Diverticulitis Father    • Skin cancer Father    • Diabetes Sister    • Diabetes Brother    • Other Maternal Aunt         malignant  "neoplasm of breast    • Breast cancer Neg Hx        Social History     Socioeconomic History   • Marital status:      Spouse name: Not on file   • Number of children: Not on file   • Years of education: Not on file   • Highest education level: Not on file   Tobacco Use   • Smoking status: Never Smoker   • Smokeless tobacco: Never Used   Substance and Sexual Activity   • Alcohol use: Yes     Comment: social   • Drug use: Never   • Sexual activity: Defer       Past Surgical History:   Procedure Laterality Date   •  SECTION     • CHOLECYSTECTOMY     • COLONOSCOPY  10/28/2016    nl with Dr. Everett Botello       The following portions of the patient's history were reviewed and updated as appropriate: problem list, allergies, current medications, past medical history, past family history, past social history and past surgical history.    Review of Systems    Immunization History   Administered Date(s) Administered   • Flu Vaccine Intradermal Quad 18-64YR 2017, 2018, 09/15/2020   • Flulaval/Fluarix/Fluzone Quad 2019   • Hepatitis A 2017, 2018   • Hepatitis B 2018, 2018, 12/10/2018   • Tdap 2017   • Typhoid, Unspecified 2017       Objective   Vitals:    03/15/21 0855   Pulse: 76   Resp: 14   Temp: 97.5 °F (36.4 °C)   Weight: 80.7 kg (178 lb)   Height: 167.6 cm (66\")     Body mass index is 28.73 kg/m².  Physical Exam  Constitutional:       Appearance: Normal appearance.   HENT:      Head: Normocephalic and atraumatic.   Cardiovascular:      Rate and Rhythm: Normal rate and regular rhythm.      Heart sounds: Normal heart sounds.   Pulmonary:      Effort: Pulmonary effort is normal.      Breath sounds: Normal breath sounds.   Chest:      Breasts:         Right: Normal. No inverted nipple or mass.         Left: Normal. No inverted nipple or mass.   Genitourinary:     Comments: External genitalia without erythema, lesions or masses. Unable to advance speculum " beyond its tip due to pain.   Lymphadenopathy:      Upper Body:      Right upper body: No axillary adenopathy.      Left upper body: No axillary adenopathy.   Neurological:      Mental Status: She is alert.   Psychiatric:         Mood and Affect: Mood normal.         Behavior: Behavior normal.         Procedures    Assessment/Plan   Diagnoses and all orders for this visit:    1. Dyspareunia in female (Primary)  Comments:  Unable top advance speculum beyond its tip due to pain. Will have her see Dr. Sanchez for further evaluation.   Orders:  -     Ambulatory Referral to Gynecology    Records reviewed include previous OV with Dr. Robles as well as labs.     Return for Next scheduled follow up.

## 2021-03-15 ENCOUNTER — OFFICE VISIT (OUTPATIENT)
Dept: INTERNAL MEDICINE | Facility: CLINIC | Age: 56
End: 2021-03-15

## 2021-03-15 ENCOUNTER — TELEPHONE (OUTPATIENT)
Dept: OBSTETRICS AND GYNECOLOGY | Age: 56
End: 2021-03-15

## 2021-03-15 VITALS
WEIGHT: 178 LBS | HEART RATE: 76 BPM | BODY MASS INDEX: 28.61 KG/M2 | HEIGHT: 66 IN | TEMPERATURE: 97.5 F | RESPIRATION RATE: 14 BRPM

## 2021-03-15 DIAGNOSIS — N94.10 DYSPAREUNIA IN FEMALE: Primary | ICD-10-CM

## 2021-03-15 PROCEDURE — 99213 OFFICE O/P EST LOW 20 MIN: CPT | Performed by: NURSE PRACTITIONER

## 2021-03-15 NOTE — TELEPHONE ENCOUNTER
Received call from [facility] with [facility].  Requesting an appt, for Ms. Polanco ASAP. Pt has Dyspareunia. Pt will be a NEW GYN. Can patient be seen with Dr Loya.  Please advise.

## 2021-04-12 ENCOUNTER — TRANSCRIBE ORDERS (OUTPATIENT)
Dept: LAB | Facility: SURGERY CENTER | Age: 56
End: 2021-04-12

## 2021-04-12 ENCOUNTER — PREP FOR SURGERY (OUTPATIENT)
Dept: SURGERY | Facility: SURGERY CENTER | Age: 56
End: 2021-04-12

## 2021-04-12 ENCOUNTER — OFFICE VISIT (OUTPATIENT)
Dept: GASTROENTEROLOGY | Facility: CLINIC | Age: 56
End: 2021-04-12

## 2021-04-12 VITALS
TEMPERATURE: 97.8 F | OXYGEN SATURATION: 99 % | SYSTOLIC BLOOD PRESSURE: 118 MMHG | WEIGHT: 181.8 LBS | BODY MASS INDEX: 29.22 KG/M2 | HEART RATE: 60 BPM | HEIGHT: 66 IN | DIASTOLIC BLOOD PRESSURE: 82 MMHG | RESPIRATION RATE: 16 BRPM

## 2021-04-12 DIAGNOSIS — K21.9 GASTROESOPHAGEAL REFLUX DISEASE, UNSPECIFIED WHETHER ESOPHAGITIS PRESENT: Primary | Chronic | ICD-10-CM

## 2021-04-12 DIAGNOSIS — K21.9 GASTROESOPHAGEAL REFLUX DISEASE, UNSPECIFIED WHETHER ESOPHAGITIS PRESENT: Primary | ICD-10-CM

## 2021-04-12 DIAGNOSIS — K57.30 DIVERTICULOSIS OF COLON: Chronic | ICD-10-CM

## 2021-04-12 DIAGNOSIS — Z01.818 OTHER SPECIFIED PRE-OPERATIVE EXAMINATION: Primary | ICD-10-CM

## 2021-04-12 PROCEDURE — 99204 OFFICE O/P NEW MOD 45 MIN: CPT | Performed by: INTERNAL MEDICINE

## 2021-04-12 RX ORDER — SODIUM CHLORIDE 0.9 % (FLUSH) 0.9 %
10 SYRINGE (ML) INJECTION AS NEEDED
Status: CANCELLED | OUTPATIENT
Start: 2021-04-12

## 2021-04-12 RX ORDER — SODIUM CHLORIDE 0.9 % (FLUSH) 0.9 %
3 SYRINGE (ML) INJECTION EVERY 12 HOURS SCHEDULED
Status: CANCELLED | OUTPATIENT
Start: 2021-04-12

## 2021-04-12 RX ORDER — CONJUGATED ESTROGENS 0.62 MG/G
CREAM VAGINAL
COMMUNITY
Start: 2021-03-19 | End: 2022-11-21

## 2021-04-12 RX ORDER — SODIUM CHLORIDE, SODIUM LACTATE, POTASSIUM CHLORIDE, CALCIUM CHLORIDE 600; 310; 30; 20 MG/100ML; MG/100ML; MG/100ML; MG/100ML
30 INJECTION, SOLUTION INTRAVENOUS CONTINUOUS PRN
Status: CANCELLED | OUTPATIENT
Start: 2021-04-12

## 2021-04-12 NOTE — PROGRESS NOTES
"Chief Complaint   Patient presents with   • Heartburn           History of Present Illness  Patient here for evaluation of worsening GERD. She had a screening colonoscopy in 2016 showing only diverticulosis.     Patient presents today with concerns about worsening reflux. Symptoms have been present for a few years and are worsening. She has increased her acid reducer and is having persistent symptoms despite this. She reports weakness to her voice and some burning associated with the GERD. She denies dysphagia. She has noted white wine makes her symptoms worse. She reports coughing at night associated with the reflux. She reports a scratchy or rough feeling to her throat.    She has a history of diverticulitis. No issues with this over the last 4-5 years.    Denies history of thyroid issues or neck surgeries.    Denies family history of colon cancer.      Result Review :       SCANNED - COLONOSCOPY (10/28/2016)  Comprehensive Metabolic Panel (01/18/2021 08:43)  Progress Notes by Luz Marina Robles APRN (03/15/2021 09:00)      Vital Signs:   /82   Pulse 60   Temp 97.8 °F (36.6 °C) (Temporal)   Resp 16   Ht 167.6 cm (66\")   Wt 82.5 kg (181 lb 12.8 oz)   SpO2 99%   BMI 29.34 kg/m²     Body mass index is 29.34 kg/m².     Physical Exam  Vitals reviewed.   Constitutional:       Appearance: Normal appearance.   Abdominal:      General: Bowel sounds are normal. There is no distension.      Palpations: Abdomen is soft. Abdomen is not rigid. There is no mass or pulsatile mass.      Tenderness: There is no abdominal tenderness. There is no guarding or rebound.           Assessment and Plan    Diagnoses and all orders for this visit:    1. Gastroesophageal reflux disease, unspecified whether esophagitis present (Primary)  Comments:  chronic and worsening    2. Diverticulosis of colon  Comments:  Noted on colonoscopy in 2016. Recommend high fiber diet.         BRIEF SUMMARY  Patient for consultation.  She has " worsening GERD with symptoms concerning for possible hiatal hernia.  She also has history of diverticulosis but no diverticulitis.  Generalized screening colonoscopy 2016 which was unremarkable.  No family history of colon cancer.  We will proceed with an EGD for further evaluation of her worsening GERD symptoms.    I have reviewed and confirmed the accuracy of the HPI and Assessment and Plan as documented by the APRN ARTURO Saha        Follow Up   Return for Follow up to review results after testing complete.    Patient Instructions   Schedule EGD for further evaluation of symptoms.    For GERD, follow antireflux precautions. Consider elevating the head of the bed at night.  Recommend avoiding eating within 3 to 4 hours of bedtime.  Avoid foods that can trigger symptoms which may include citrus fruits, spicy foods, tomatoes and red sauces, chocolate, coffee/tea, caffeinated or carbonated beverages, alcohol.    For diverticulosis, follow a high-fiber diet.  Consider starting a daily fiber supplement, such as Metamucil or Citrucel, available over-the-counter.    Colon cancer screening up to date. Due for colonoscopy in 2026.         Documentation by Poppy MORRIS acting as a scribe in the following sections on behalf of the billable provider: HPI, ROS, assessment, & plan.

## 2021-04-12 NOTE — PATIENT INSTRUCTIONS
Schedule EGD for further evaluation of symptoms.    For GERD, follow antireflux precautions. Consider elevating the head of the bed at night.  Recommend avoiding eating within 3 to 4 hours of bedtime.  Avoid foods that can trigger symptoms which may include citrus fruits, spicy foods, tomatoes and red sauces, chocolate, coffee/tea, caffeinated or carbonated beverages, alcohol.    For diverticulosis, follow a high-fiber diet.  Consider starting a daily fiber supplement, such as Metamucil or Citrucel, available over-the-counter.    Colon cancer screening up to date. Due for colonoscopy in 2026.

## 2021-05-07 ENCOUNTER — LAB (OUTPATIENT)
Dept: LAB | Facility: SURGERY CENTER | Age: 56
End: 2021-05-07

## 2021-05-07 DIAGNOSIS — Z01.818 OTHER SPECIFIED PRE-OPERATIVE EXAMINATION: ICD-10-CM

## 2021-05-07 LAB — SARS-COV-2 ORF1AB RESP QL NAA+PROBE: NOT DETECTED

## 2021-05-07 PROCEDURE — U0004 COV-19 TEST NON-CDC HGH THRU: HCPCS

## 2021-05-07 PROCEDURE — C9803 HOPD COVID-19 SPEC COLLECT: HCPCS

## 2021-05-10 ENCOUNTER — HOSPITAL ENCOUNTER (OUTPATIENT)
Facility: SURGERY CENTER | Age: 56
Setting detail: HOSPITAL OUTPATIENT SURGERY
Discharge: HOME | End: 2021-05-10
Attending: INTERNAL MEDICINE | Admitting: INTERNAL MEDICINE

## 2021-05-10 ENCOUNTER — ANESTHESIA EVENT (OUTPATIENT)
Dept: SURGERY | Facility: SURGERY CENTER | Age: 56
End: 2021-05-10

## 2021-05-10 ENCOUNTER — ANESTHESIA (OUTPATIENT)
Dept: SURGERY | Facility: SURGERY CENTER | Age: 56
End: 2021-05-10

## 2021-05-10 VITALS
SYSTOLIC BLOOD PRESSURE: 101 MMHG | BODY MASS INDEX: 28.41 KG/M2 | OXYGEN SATURATION: 94 % | TEMPERATURE: 97.4 F | RESPIRATION RATE: 16 BRPM | WEIGHT: 176 LBS | DIASTOLIC BLOOD PRESSURE: 67 MMHG | HEART RATE: 66 BPM

## 2021-05-10 DIAGNOSIS — K21.9 GASTROESOPHAGEAL REFLUX DISEASE, UNSPECIFIED WHETHER ESOPHAGITIS PRESENT: ICD-10-CM

## 2021-05-10 PROCEDURE — 88305 TISSUE EXAM BY PATHOLOGIST: CPT | Performed by: INTERNAL MEDICINE

## 2021-05-10 PROCEDURE — 0DB48ZX EXCISION OF ESOPHAGOGASTRIC JUNCTION, VIA NATURAL OR ARTIFICIAL OPENING ENDOSCOPIC, DIAGNOSTIC: ICD-10-PCS | Performed by: INTERNAL MEDICINE

## 2021-05-10 PROCEDURE — 25010000002 PROPOFOL 10 MG/ML EMULSION: Performed by: ANESTHESIOLOGY

## 2021-05-10 PROCEDURE — 43239 EGD BIOPSY SINGLE/MULTIPLE: CPT | Performed by: INTERNAL MEDICINE

## 2021-05-10 PROCEDURE — 0DB68ZX EXCISION OF STOMACH, VIA NATURAL OR ARTIFICIAL OPENING ENDOSCOPIC, DIAGNOSTIC: ICD-10-PCS | Performed by: INTERNAL MEDICINE

## 2021-05-10 RX ORDER — SODIUM CHLORIDE 0.9 % (FLUSH) 0.9 %
10 SYRINGE (ML) INJECTION AS NEEDED
Status: DISCONTINUED | OUTPATIENT
Start: 2021-05-10 | End: 2021-05-10 | Stop reason: HOSPADM

## 2021-05-10 RX ORDER — MAGNESIUM HYDROXIDE 1200 MG/15ML
LIQUID ORAL AS NEEDED
Status: DISCONTINUED | OUTPATIENT
Start: 2021-05-10 | End: 2021-05-10 | Stop reason: HOSPADM

## 2021-05-10 RX ORDER — SODIUM CHLORIDE 0.9 % (FLUSH) 0.9 %
3 SYRINGE (ML) INJECTION EVERY 12 HOURS SCHEDULED
Status: DISCONTINUED | OUTPATIENT
Start: 2021-05-10 | End: 2021-05-10 | Stop reason: HOSPADM

## 2021-05-10 RX ORDER — SODIUM CHLORIDE, SODIUM LACTATE, POTASSIUM CHLORIDE, CALCIUM CHLORIDE 600; 310; 30; 20 MG/100ML; MG/100ML; MG/100ML; MG/100ML
30 INJECTION, SOLUTION INTRAVENOUS CONTINUOUS PRN
Status: DISCONTINUED | OUTPATIENT
Start: 2021-05-10 | End: 2021-05-10 | Stop reason: HOSPADM

## 2021-05-10 RX ORDER — LIDOCAINE HYDROCHLORIDE 20 MG/ML
INJECTION, SOLUTION INFILTRATION; PERINEURAL AS NEEDED
Status: DISCONTINUED | OUTPATIENT
Start: 2021-05-10 | End: 2021-05-10 | Stop reason: SURG

## 2021-05-10 RX ORDER — PROPOFOL 10 MG/ML
VIAL (ML) INTRAVENOUS AS NEEDED
Status: DISCONTINUED | OUTPATIENT
Start: 2021-05-10 | End: 2021-05-10 | Stop reason: SURG

## 2021-05-10 RX ADMIN — PROPOFOL 150 MG: 10 INJECTION, EMULSION INTRAVENOUS at 08:30

## 2021-05-10 RX ADMIN — LIDOCAINE HYDROCHLORIDE 60 MG: 20 INJECTION, SOLUTION INFILTRATION; PERINEURAL at 08:30

## 2021-05-10 RX ADMIN — PROPOFOL 30 MG: 10 INJECTION, EMULSION INTRAVENOUS at 08:33

## 2021-05-10 RX ADMIN — SODIUM CHLORIDE, POTASSIUM CHLORIDE, SODIUM LACTATE AND CALCIUM CHLORIDE 30 ML/HR: 600; 310; 30; 20 INJECTION, SOLUTION INTRAVENOUS at 08:14

## 2021-05-10 RX ADMIN — PROPOFOL 30 MG: 10 INJECTION, EMULSION INTRAVENOUS at 08:31

## 2021-05-10 NOTE — ANESTHESIA POSTPROCEDURE EVALUATION
Patient: Mariela Polanco    Procedure Summary     Date: 05/10/21 Room / Location: [facility]    Anesthesia Start: 0824 Anesthesia Stop: 0839    Procedure: ESOPHAGOGASTRODUODENOSCOPY WITH BIOPSY (N/A ) Diagnosis:       Gastroesophageal reflux disease, unspecified whether esophagitis present      (Gastroesophageal reflux disease, unspecified whether esophagitis present [K21.9])    Surgeons: Dakota Nowak MD Provider: Humberto Thompson MD    Anesthesia Type: MAC ASA Status: 2          Anesthesia Type: MAC    Vitals  Vitals Value Taken Time   /67 05/10/21 0853   Temp 36.3 °C (97.4 °F) 05/10/21 0840   Pulse 66 05/10/21 0853   Resp 16 05/10/21 0853   SpO2 94 % 05/10/21 0853           Post Anesthesia Care and Evaluation    Patient location during evaluation: bedside  Patient participation: complete - patient participated  Level of consciousness: awake and alert  Pain management: adequate  Airway patency: patent  Anesthetic complications: No anesthetic complications    Cardiovascular status: acceptable  Respiratory status: acceptable  Hydration status: acceptable    Comments: /67   Pulse 66   Temp 36.3 °C (97.4 °F)   Resp 16   Wt 79.8 kg (176 lb)   SpO2 94%   BMI 28.41 kg/m²

## 2021-05-11 DIAGNOSIS — G47.00 INSOMNIA, UNSPECIFIED TYPE: ICD-10-CM

## 2021-05-11 DIAGNOSIS — K21.9 GASTROESOPHAGEAL REFLUX DISEASE WITHOUT ESOPHAGITIS: ICD-10-CM

## 2021-05-11 RX ORDER — TRAZODONE HYDROCHLORIDE 50 MG/1
50 TABLET ORAL NIGHTLY
Qty: 60 TABLET | Refills: 1 | Status: SHIPPED | OUTPATIENT
Start: 2021-05-11 | End: 2021-08-23 | Stop reason: SDUPTHER

## 2021-05-11 RX ORDER — OMEPRAZOLE 40 MG/1
40 CAPSULE, DELAYED RELEASE ORAL DAILY
Qty: 30 CAPSULE | Refills: 4 | Status: SHIPPED | OUTPATIENT
Start: 2021-05-11 | End: 2021-06-18

## 2021-05-12 LAB
LAB AP CASE REPORT: NORMAL
LAB AP CLINICAL INFORMATION: NORMAL
PATH REPORT.FINAL DX SPEC: NORMAL
PATH REPORT.GROSS SPEC: NORMAL

## 2021-06-15 DIAGNOSIS — I10 ESSENTIAL HYPERTENSION: ICD-10-CM

## 2021-06-15 RX ORDER — AMLODIPINE BESYLATE 5 MG/1
5 TABLET ORAL DAILY
Qty: 30 TABLET | Refills: 6 | Status: SHIPPED | OUTPATIENT
Start: 2021-06-15 | End: 2022-02-22 | Stop reason: SDUPTHER

## 2021-06-15 NOTE — PROGRESS NOTES
"Chief Complaint   Patient presents with   • Heartburn         History of Present Illness  Patient is a 55-year-old female who presents today for follow-up.    She has a history of GERD, diverticulitis.  She had an EGD May 2021 with a 1 cm hiatal hernia, LA grade a esophagitis.  Gastric polyps.  Biopsies were benign.    Patient continues on omeprazole for GERD.  She reports she has been experiencing a breakthrough symptoms despite treatment with this.  Symptoms primarily occur at night and she will wake up with her throat feeling raw in the morning.         Result Review :       Tissue Pathology Exam (05/10/2021 08:31)   UPPER GI ENDOSCOPY (05/10/2021 08:24)   SCANNED - COLONOSCOPY (10/28/2016)   Office Visit with Dakota Nowak MD (04/12/2021)       Vital Signs:   /84   Pulse 75   Temp 96.8 °F (36 °C)   Ht 167.6 cm (66\")   Wt 79.2 kg (174 lb 9.6 oz)   SpO2 98%   BMI 28.18 kg/m²     Body mass index is 28.18 kg/m².     Physical Exam  Vitals reviewed.   Constitutional:       General: She is not in acute distress.     Appearance: She is well-developed.   HENT:      Head: Normocephalic and atraumatic.   Pulmonary:      Effort: Pulmonary effort is normal. No respiratory distress.   Skin:     General: Skin is dry.      Coloration: Skin is not pale.   Neurological:      Mental Status: She is alert and oriented to person, place, and time.   Psychiatric:         Thought Content: Thought content normal.       Assessment and Plan    Diagnoses and all orders for this visit:    1. Gastroesophageal reflux disease with esophagitis without hemorrhage (Primary)    2. Hiatal hernia    3. Diverticulosis    Other orders  -     pantoprazole (PROTONIX) 40 MG EC tablet; Take 1 tablet by mouth Daily.  Dispense: 30 tablet; Refill: 11         Discussion  EGD findings reviewed at today's office visit.  Discussed findings of hiatal hernia and esophagitis.  Due to esophagitis and persistent symptoms, will change to alternative " proton pump inhibitor.  Reviewed dietary modifications to help with reflux in the setting of hiatal hernia.          Follow Up   Return in about 1 year (around 6/18/2022).    Patient Instructions   For GERD with esophagitis, stop omeprazole and begin taking pantoprazole 40 mg daily.  Prescription sent to pharmacy.    For GERD with hiatal hernia, follow antireflux precautions.  Recommend avoiding eating within 3 to 4 hours of bedtime and avoid overeating.  Avoid foods that can trigger symptoms which may include citrus fruits, spicy foods, tomatoes and red sauces, chocolate, coffee/tea, caffeinated or carbonated beverages, alcohol.    For diverticulosis, follow a high-fiber diet.

## 2021-06-18 ENCOUNTER — OFFICE VISIT (OUTPATIENT)
Dept: GASTROENTEROLOGY | Facility: CLINIC | Age: 56
End: 2021-06-18

## 2021-06-18 VITALS
DIASTOLIC BLOOD PRESSURE: 84 MMHG | HEART RATE: 75 BPM | WEIGHT: 174.6 LBS | HEIGHT: 66 IN | BODY MASS INDEX: 28.06 KG/M2 | TEMPERATURE: 96.8 F | OXYGEN SATURATION: 98 % | SYSTOLIC BLOOD PRESSURE: 124 MMHG

## 2021-06-18 DIAGNOSIS — K44.9 HIATAL HERNIA: ICD-10-CM

## 2021-06-18 DIAGNOSIS — K57.90 DIVERTICULOSIS: ICD-10-CM

## 2021-06-18 DIAGNOSIS — K21.00 GASTROESOPHAGEAL REFLUX DISEASE WITH ESOPHAGITIS WITHOUT HEMORRHAGE: Primary | ICD-10-CM

## 2021-06-18 PROCEDURE — 99214 OFFICE O/P EST MOD 30 MIN: CPT | Performed by: NURSE PRACTITIONER

## 2021-06-18 RX ORDER — PANTOPRAZOLE SODIUM 40 MG/1
40 TABLET, DELAYED RELEASE ORAL DAILY
Qty: 30 TABLET | Refills: 11 | Status: SHIPPED | OUTPATIENT
Start: 2021-06-18 | End: 2021-08-23

## 2021-06-18 NOTE — PATIENT INSTRUCTIONS
For GERD with esophagitis, stop omeprazole and begin taking pantoprazole 40 mg daily.  Prescription sent to pharmacy.    For GERD with hiatal hernia, follow antireflux precautions.  Recommend avoiding eating within 3 to 4 hours of bedtime and avoid overeating.  Avoid foods that can trigger symptoms which may include citrus fruits, spicy foods, tomatoes and red sauces, chocolate, coffee/tea, caffeinated or carbonated beverages, alcohol.    For diverticulosis, follow a high-fiber diet.

## 2021-07-26 DIAGNOSIS — F32.5 MAJOR DEPRESSIVE DISORDER IN FULL REMISSION, UNSPECIFIED WHETHER RECURRENT (HCC): ICD-10-CM

## 2021-07-26 RX ORDER — BUPROPION HYDROCHLORIDE 300 MG/1
300 TABLET ORAL DAILY
Qty: 90 TABLET | Refills: 1 | Status: SHIPPED | OUTPATIENT
Start: 2021-07-26 | End: 2021-08-23 | Stop reason: SDUPTHER

## 2021-08-23 ENCOUNTER — OFFICE VISIT (OUTPATIENT)
Dept: INTERNAL MEDICINE | Facility: CLINIC | Age: 56
End: 2021-08-23

## 2021-08-23 VITALS
HEIGHT: 66 IN | BODY MASS INDEX: 28.03 KG/M2 | TEMPERATURE: 98 F | DIASTOLIC BLOOD PRESSURE: 80 MMHG | SYSTOLIC BLOOD PRESSURE: 120 MMHG | WEIGHT: 174.4 LBS

## 2021-08-23 DIAGNOSIS — R73.03 PRE-DIABETES: ICD-10-CM

## 2021-08-23 DIAGNOSIS — K21.00 GASTROESOPHAGEAL REFLUX DISEASE WITH ESOPHAGITIS WITHOUT HEMORRHAGE: ICD-10-CM

## 2021-08-23 DIAGNOSIS — I10 ESSENTIAL HYPERTENSION: Chronic | ICD-10-CM

## 2021-08-23 DIAGNOSIS — G47.00 INSOMNIA, UNSPECIFIED TYPE: ICD-10-CM

## 2021-08-23 DIAGNOSIS — Z83.71 FH: COLON POLYPS: ICD-10-CM

## 2021-08-23 DIAGNOSIS — Z12.31 ENCOUNTER FOR SCREENING MAMMOGRAM FOR MALIGNANT NEOPLASM OF BREAST: ICD-10-CM

## 2021-08-23 DIAGNOSIS — Z00.00 WELLNESS EXAMINATION: Primary | ICD-10-CM

## 2021-08-23 DIAGNOSIS — F32.5 MAJOR DEPRESSIVE DISORDER IN FULL REMISSION, UNSPECIFIED WHETHER RECURRENT (HCC): ICD-10-CM

## 2021-08-23 PROBLEM — Z83.719 FH: COLON POLYPS: Status: ACTIVE | Noted: 2021-08-23

## 2021-08-23 PROCEDURE — 99396 PREV VISIT EST AGE 40-64: CPT | Performed by: INTERNAL MEDICINE

## 2021-08-23 RX ORDER — OMEPRAZOLE 40 MG/1
40 CAPSULE, DELAYED RELEASE ORAL DAILY
Qty: 30 CAPSULE | Refills: 5 | Status: SHIPPED | OUTPATIENT
Start: 2021-08-23 | End: 2022-02-22 | Stop reason: SDUPTHER

## 2021-08-23 RX ORDER — BUPROPION HYDROCHLORIDE 300 MG/1
300 TABLET ORAL DAILY
Qty: 90 TABLET | Refills: 1 | Status: SHIPPED | OUTPATIENT
Start: 2021-08-23 | End: 2022-02-22 | Stop reason: SDUPTHER

## 2021-08-23 RX ORDER — TRAZODONE HYDROCHLORIDE 50 MG/1
50 TABLET ORAL NIGHTLY
Qty: 30 TABLET | Refills: 5 | Status: SHIPPED | OUTPATIENT
Start: 2021-08-23 | End: 2022-02-22 | Stop reason: SDUPTHER

## 2021-08-23 RX ORDER — OMEPRAZOLE 40 MG/1
40 CAPSULE, DELAYED RELEASE ORAL DAILY
COMMUNITY
Start: 2021-07-27 | End: 2021-08-23 | Stop reason: SDUPTHER

## 2021-08-23 NOTE — PROGRESS NOTES
Subjective        Chief Complaint   Patient presents with   • Annual Exam           Mariela Polanco is a 55 y.o. female who presents for    Patient Active Problem List   Diagnosis   • Essential hypertension   • Iron deficiency anemia   • Depression   • Gastroesophageal reflux disease   • Pre-diabetes   • FH: colon polyps       History of Present Illness     She denies chest pain or dyspnea. She has not been checking her BP. She takes an occasional stool softener. She denies depression, anxiety, melena or hematochezia. She saw gyn for painful intercourse. She did PT with [facility] which helped.  Allergies   Allergen Reactions   • Penicillins Other (See Comments)     Childhood allergy       Current Outpatient Medications on File Prior to Visit   Medication Sig Dispense Refill   • amLODIPine (NORVASC) 5 MG tablet TAKE 1 TABLET BY MOUTH DAILY 30 tablet 6   • Premarin 0.625 MG/GM vaginal cream      • [DISCONTINUED] buPROPion XL (WELLBUTRIN XL) 300 MG 24 hr tablet TAKE 1 TABLET BY MOUTH DAILY 90 tablet 1   • [DISCONTINUED] omeprazole (priLOSEC) 40 MG capsule Take 40 mg by mouth Daily.     • [DISCONTINUED] traZODone (DESYREL) 50 MG tablet TAKE 1 TABLET BY MOUTH EVERY NIGHT 60 tablet 1   • [DISCONTINUED] pantoprazole (PROTONIX) 40 MG EC tablet Take 1 tablet by mouth Daily. 30 tablet 11     No current facility-administered medications on file prior to visit.       Past Medical History:   Diagnosis Date   • Adrenal adenoma    • Anxiety    • History of diverticulitis    • Insomnia    • Menopausal symptoms    • Pneumonia    • Rosacea    • Steatosis, liver    • Vitamin D deficiency        Past Surgical History:   Procedure Laterality Date   •  SECTION     • CHOLECYSTECTOMY     • COLONOSCOPY  10/28/2016    nl with Dr. Everett Botello   • ENDOSCOPY N/A 5/10/2021    Procedure: ESOPHAGOGASTRODUODENOSCOPY WITH BIOPSY;  Surgeon: Dakota Nowak MD;  [place] [facility] [place]  Service: Gastroenterology;   Laterality: N/A;  GASTRIC POLYPS, SMALL HIATAL HERNIA, ESOPHAGITIS       Family History   Problem Relation Age of Onset   • Other Mother         ESRD (end stage renal disease)   • Heart failure Mother         chronic congestive heart failure   • Diabetes Mother    • Hyperlipidemia Mother    • Hypertension Mother    • Pneumonia Mother    • Skin cancer Mother    • Diverticulitis Father    • Skin cancer Father    • Colon polyps Father    • Diabetes Sister    • Diabetes Brother    • Other Maternal Aunt         malignant neoplasm of breast    • Crohn's disease Other    • Breast cancer Neg Hx    • Colon cancer Neg Hx    • Irritable bowel syndrome Neg Hx    • Ulcerative colitis Neg Hx        Social History     Socioeconomic History   • Marital status:      Spouse name: Not on file   • Number of children: Not on file   • Years of education: Not on file   • Highest education level: Not on file   Tobacco Use   • Smoking status: Never Smoker   • Smokeless tobacco: Never Used   Vaping Use   • Vaping Use: Never used   Substance and Sexual Activity   • Alcohol use: Yes     Comment: social   • Drug use: Never   • Sexual activity: Defer           The following portions of the patient's history were reviewed and updated as appropriate: problem list, allergies, current medications, past medical history, past family history, past social history and past surgical history.    Review of Systems   Constitutional: Negative for chills, fever and unexpected weight loss.   HENT: Negative for postnasal drip and sore throat.    Eyes: Negative for blurred vision.   Respiratory: Negative for cough, shortness of breath and wheezing.    Cardiovascular: Negative for chest pain and leg swelling.   Gastrointestinal: Positive for constipation. Negative for abdominal pain, blood in stool, nausea, vomiting and GERD.   Endocrine: Negative for polyuria.   Genitourinary: Negative for dysuria, frequency and hematuria.   Musculoskeletal: Negative for  "gait problem.   Skin: Negative for rash.   Allergic/Immunologic: Negative for immunocompromised state.   Neurological: Negative for weakness.   Hematological: Does not bruise/bleed easily.   Psychiatric/Behavioral: Negative for depressed mood. The patient is not nervous/anxious.        Immunization History   Administered Date(s) Administered   • COVID-19 (MODERNA) 03/11/2021, 04/08/2021   • Flu Vaccine Intradermal Quad 18-64YR 11/27/2017, 11/12/2018, 09/15/2020   • Flulaval/Fluarix/Fluzone Quad 12/27/2019   • Hepatitis A 12/19/2017, 11/12/2018   • Hepatitis B 03/01/2018, 05/11/2018, 12/10/2018   • Tdap 11/27/2017   • Typhoid, Unspecified 12/19/2017       Objective   Vitals:    08/23/21 0803   BP: 120/80   Temp: 98 °F (36.7 °C)   Weight: 79.1 kg (174 lb 6.4 oz)   Height: 167.6 cm (66\")     Body mass index is 28.15 kg/m².  Physical Exam  Vitals reviewed.   Constitutional:       Appearance: She is well-developed.   HENT:      Head: Normocephalic and atraumatic.      Mouth/Throat:      Mouth: Mucous membranes are moist.      Pharynx: Oropharynx is clear.   Eyes:      Extraocular Movements: Extraocular movements intact.      Conjunctiva/sclera: Conjunctivae normal.      Pupils: Pupils are equal, round, and reactive to light.   Neck:      Thyroid: No thyromegaly.      Vascular: No carotid bruit.   Cardiovascular:      Rate and Rhythm: Normal rate and regular rhythm.      Heart sounds: Normal heart sounds. No murmur heard.     Pulmonary:      Effort: Pulmonary effort is normal.      Breath sounds: Normal breath sounds.   Abdominal:      General: There is no distension.      Palpations: Abdomen is soft. There is no mass.      Tenderness: There is no abdominal tenderness. There is no rebound.   Musculoskeletal:      Cervical back: Neck supple.   Lymphadenopathy:      Cervical: No cervical adenopathy.   Skin:     General: Skin is warm.   Neurological:      Mental Status: She is alert.   Psychiatric:         Behavior: Behavior " normal.         Procedures    Assessment/Plan   Diagnoses and all orders for this visit:    1. Wellness examination (Primary)    2. Essential hypertension  -     Comprehensive Metabolic Panel; Future  -     Lipid Panel With / Chol / HDL Ratio; Future  -     CBC & Differential; Future    3. Gastroesophageal reflux disease with esophagitis without hemorrhage    4. Encounter for screening mammogram for malignant neoplasm of breast  -     Mammo Screening Bilateral With CAD    5. Pre-diabetes  -     Hemoglobin A1c; Future    6. FH: colon polyps  -     Ambulatory Referral For Screening Colonoscopy    7. Insomnia, unspecified type  -     traZODone (DESYREL) 50 MG tablet; Take 1 tablet by mouth Every Night.  Dispense: 30 tablet; Refill: 5    8. Major depressive disorder in full remission, unspecified whether recurrent (CMS/HCC)  -     buPROPion XL (WELLBUTRIN XL) 300 MG 24 hr tablet; Take 1 tablet by mouth Daily.  Dispense: 90 tablet; Refill: 1    Other orders  -     omeprazole (priLOSEC) 40 MG capsule; Take 1 capsule by mouth Daily.  Dispense: 30 capsule; Refill: 5               Recc Shingrix at drug store. Discussed exercising 150 minutes per week. Set up MMG and 5 year cscope.  Return in about 6 months (around 2/23/2022) for Lab Before FUP.

## 2021-09-01 ENCOUNTER — TELEMEDICINE (OUTPATIENT)
Dept: FAMILY MEDICINE CLINIC | Facility: TELEHEALTH | Age: 56
End: 2021-09-01

## 2021-09-01 DIAGNOSIS — J06.9 VIRAL UPPER RESPIRATORY TRACT INFECTION: Primary | ICD-10-CM

## 2021-09-01 DIAGNOSIS — Z20.822 EXPOSURE TO COVID-19 VIRUS: ICD-10-CM

## 2021-09-01 PROCEDURE — 99213 OFFICE O/P EST LOW 20 MIN: CPT | Performed by: NURSE PRACTITIONER

## 2021-09-01 NOTE — PATIENT INSTRUCTIONS
Due to your symptoms I have ordered a COVID-19 test for you to rule this out. Please go to your nearest [facility] for COVID-19 testing. When you arrive and let them know you have an order. We will call you with the results from a BLOCKED NUMBER, usually within 1-3 days.     SELF QUARANTINE until you meet the following criteria:   -It has been at least 10 days from the onset of your symptoms,   -A minimum of 24 hours fever free without fever reducing medicine   -AND improved symptoms   **Wear a cloth or surgical mask for 14 days or until symptoms have resolved**      Treat symptoms as you would with any cold or viral illness.   Alternate tylenol and motrin for pain and/or fever, stay hydrated and rest.   If symptoms worsen or do not improve follow up with your PCP or visit your nearest Urgent Care Center or ER.        How to Quarantine at Home  Information for Patients and Families    These instructions are for people with confirmed or suspected COVID-19 who do not need to be hospitalized and those with confirmed COVID-19 who were hospitalized and discharged to care for themselves at home.    If you were tested through the Health Department  The Health Department will monitor your wellbeing.  If it is determined that you do not need to be hospitalized and can be isolated at home, you will be monitored by staff from your local or state health department.     If you were tested through a Commercial Lab  You will need to monitor yourself and report changes in your symptoms to your doctor.  See the section below called Monitor Your Symptoms.    Follow these steps until a healthcare provider or local or state health department says you can return to your normal activities.    Stay home except to get medical care  • Restrict activities outside your home, except for getting medical care.   • Do not go to work, school, or public areas.   • Avoid using public transportation, ride-sharing, or  taxis.    Separate yourself from other people and animals in your home  People  As much as possible, you should stay in a specific room and away from other people in your home. Also, you should use a separate bathroom, if available.    Animals  You should restrict contact with pets and other animals while you are sick with COVID-19, just like you would around other people. When possible, have another member of your household care for your animals while you are sick. If you are sick with COVID-19, avoid contact with your pet, including petting, snuggling, being kissed or licked, and sharing food. If you must care for your pet or be around animals while you are sick, wash your hands before and after you interact with pets and wear a facemask. See COVID-19 and Animals for more information.    Call ahead before visiting your doctor  If you have a medical appointment, call the healthcare provider and tell them that you have or may have COVID-19. This information will help the healthcare provider’s office take steps to keep other people from getting infected or exposed.    Wear a facemask  You should wear a facemask when you are around other people (e.g., sharing a room or vehicle) or pets and before you enter a healthcare provider’s office.     If you are not able to wear a facemask (for example, because it causes trouble breathing), then people who live with you should not stay in the same room with you, or they should wear a facemask if they enter your room.    Cover your coughs and sneezes  • Cover your mouth and nose with a tissue when you cough or sneeze.   • Throw used tissues in a lined trash can.   • Immediately wash your hands with soap and water for at least 20 seconds or, if soap and water are not available, clean your hands with an alcohol-based hand  that contains at least 60% alcohol.    Clean your hands often  • Wash your hands often with soap and water for at least 20 seconds, especially after  blowing your nose, coughing, or sneezing; going to the bathroom; and before eating or preparing food.     • If soap and water are not readily available, use an alcohol-based hand  with at least 60% alcohol, covering all surfaces of your hands and rubbing them together until they feel dry.    • Soap and water are the best option if hands are visibly dirty. Avoid touching your eyes, nose, and mouth with unwashed hands.    Avoid sharing personal household items  • You should not share dishes, drinking glasses, cups, eating utensils, towels, or bedding with other people or pets in your home.   • After using these items, they should be washed thoroughly with soap and water.    Clean all “high-touch” surfaces everyday  • High touch surfaces include counters, tabletops, doorknobs, bathroom fixtures, toilets, phones, keyboards, tablets, and bedside tables.   • Also, clean any surfaces that may have blood, stool, or body fluids on them.   • Use a household cleaning spray or wipe, according to the label instructions. Labels contain instructions for safe and effective use of the cleaning product, including precautions you should take when applying the product, such as wearing gloves and making sure you have good ventilation during use of the product.    Monitor your symptoms  • Seek prompt medical attention if your illness is worsening (e.g., difficulty breathing).   • Before seeking care, call your healthcare provider and tell them that you have, or are being evaluated for, COVID-19.   • Put on a facemask before you enter the facility.     • These steps will help the healthcare provider’s office to keep other people in the office or waiting room from getting infected or exposed.   • Persons who are placed under active monitoring or facilitated self-monitoring should follow instructions provided by their local health department or occupational health professionals, as appropriate.  • If you have a medical emergency  and need to call 911, notify the dispatch personnel that you have, or are being evaluated for COVID-19. If possible, put on a facemask before emergency medical services arrive.    Discontinuing home isolation  Patients with confirmed COVID-19 should remain under home isolation precautions until the risk of secondary transmission to others is thought to be low. The decision to discontinue home isolation precautions should be made on a case-by-case basis, in consultation with healthcare providers and state and local health departments.    The below content are for household members, intimate partners, and caregivers of a patient with symptomatic laboratory-confirmed COVID-19 or a patient under investigation:    Household members, intimate partners, and caregivers may have close contact with a person with symptomatic, laboratory-confirmed COVID-19 or a person under investigation.     Close contacts should monitor their health; they should call their healthcare provider right away if they develop symptoms suggestive of COVID-19 (e.g., fever, cough, shortness of breath)     Close contacts should also follow these recommendations:  • Make sure that you understand and can help the patient follow their healthcare provider’s instructions for medication(s) and care. You should help the patient with basic needs in the home and provide support for getting groceries, prescriptions, and other personal needs.  • Monitor the patient’s symptoms. If the patient is getting sicker, call his or her healthcare provider and tell them that the patient has laboratory-confirmed COVID-19. This will help the healthcare provider’s office take steps to keep other people in the office or waiting room from getting infected. Ask the healthcare provider to call the local or state health department for additional guidance. If the patient has a medical emergency and you need to call 911, notify the dispatch personnel that the patient has, or is being  evaluated for COVID-19.  • Household members should stay in another room or be  from the patient as much as possible. Household members should use a separate bedroom and bathroom, if available.  • Prohibit visitors who do not have an essential need to be in the home.  • Household members should care for any pets in the home. Do not handle pets or other animals while sick.  For more information, see COVID-19 and Animals.  • Make sure that shared spaces in the home have good air flow, such as by an air conditioner or an opened window, weather permitting.  • Perform hand hygiene frequently. Wash your hands often with soap and water for at least 20 seconds or use an alcohol-based hand  that contains 60 to 95% alcohol, covering all surfaces of your hands and rubbing them together until they feel dry. Soap and water should be used preferentially if hands are visibly dirty.  • Avoid touching your eyes, nose, and mouth with unwashed hands.  • The patient should wear a facemask when you are around other people. If the patient is not able to wear a facemask (for example, because it causes trouble breathing), you, as the caregiver, should wear a mask when you are in the same room as the patient.  • Wear a disposable facemask and gloves when you touch or have contact with the patient’s blood, stool, or body fluids, such as saliva, sputum, nasal mucus, vomit, or urine.   o Throw out disposable facemasks and gloves after using them. Do not reuse.  o When removing personal protective equipment, first remove and dispose of gloves. Then, immediately clean your hands with soap and water or alcohol-based hand . Next, remove and dispose of facemask, and immediately clean your hands again with soap and water or alcohol-based hand .  • Avoid sharing household items with the patient. You should not share dishes, drinking glasses, cups, eating utensils, towels, bedding, or other items. After the patient  uses these items, you should wash them thoroughly (see below “Wash laundry thoroughly”).  • Clean all “high-touch” surfaces, such as counters, tabletops, doorknobs, bathroom fixtures, toilets, phones, keyboards, tablets, and bedside tables, every day. Also, clean any surfaces that may have blood, stool, or body fluids on them.   o Use a household cleaning spray or wipe, according to the label instructions. Labels contain instructions for safe and effective use of the cleaning product including precautions you should take when applying the product, such as wearing gloves and making sure you have good ventilation during use of the product.  • Wash laundry thoroughly.   o Immediately remove and wash clothes or bedding that have blood, stool, or body fluids on them.  o Wear disposable gloves while handling soiled items and keep soiled items away from your body. Clean your hands (with soap and water or an alcohol-based hand ) immediately after removing your gloves.  o Read and follow directions on labels of laundry or clothing items and detergent. In general, using a normal laundry detergent according to washing machine instructions and dry thoroughly using the warmest temperatures recommended on the clothing label.  • Place all used disposable gloves, facemasks, and other contaminated items in a lined container before disposing of them with other household waste. Clean your hands (with soap and water or an alcohol-based hand ) immediately after handling these items. Soap and water should be used preferentially if hands are visibly dirty.  • Discuss any additional questions with your state or local health department or healthcare provider.    Adapted from information provided by the Centers for Disease Control and Prevention.  For more information, visit https://www.cdc.gov/coronavirus/2019-ncov/hcp/guidance-prevent-spread.html10 Things You Can Do to Manage Your COVID-19 Symptoms at Home  If you have  possible or confirmed COVID-19:  1. Stay home from work and school. And stay away from other public places. If you must go out, avoid using any kind of public transportation, ridesharing, or taxis.  2. Monitor your symptoms carefully. If your symptoms get worse, call your healthcare provider immediately.  3. Get rest and stay hydrated.  4. If you have a medical appointment, call the healthcare provider ahead of time and tell them that you have or may have COVID-19.  5. For medical emergencies, call 911 and notify the dispatch personnel that you have or may have COVID-19.  6. Cover your cough and sneezes with a tissue or use the inside of your elbow.  7. Wash your hands often with soap and water for at least 20 seconds or clean your hands with an alcohol-based hand  that contains at least 60% alcohol.  8. As much as possible, stay in a specific room and away from other people in your home. Also, you should use a separate bathroom, if available. If you need to be around other people in or outside of the home, wear a mask.  9. Avoid sharing personal items with other people in your household, like dishes, towels, and bedding.  10. Clean all surfaces that are touched often, like counters, tabletops, and doorknobs. Use household cleaning sprays or wipes according to the label instructions.  cdc.gov/coronavirus  07/01/2020  This information is not intended to replace advice given to you by your health care provider. Make sure you discuss any questions you have with your health care provider.  Document Revised: 04/15/2021 Document Reviewed: 04/15/2021  [employer]vier Patient Education © 2021 [employer]  Viral Respiratory Infection  A respiratory infection is an illness that affects part of the respiratory system, such as the lungs, nose, or throat. A respiratory infection that is caused by a virus is called a viral respiratory infection.  Common types of viral respiratory infections include:  · A cold.  · The flu  (influenza).  · A respiratory syncytial virus (RSV) infection.  What are the causes?  This condition is caused by a virus.  What are the signs or symptoms?  Symptoms of this condition include:  · A stuffy or runny nose.  · Yellow or green nasal discharge.  · A cough.  · Sneezing.  · Fatigue.  · Achy muscles.  · A sore throat.  · Sweating or chills.  · A fever.  · A headache.  How is this diagnosed?  This condition may be diagnosed based on:  · Your symptoms.  · A physical exam.  · Testing of nasal swabs.  How is this treated?  This condition may be treated with medicines, such as:  · Antiviral medicine. This may shorten the length of time a person has symptoms.  · Expectorants. These make it easier to cough up mucus.  · Decongestant nasal sprays.  · Acetaminophen or NSAIDs to relieve fever and pain.  Antibiotic medicines are not prescribed for viral infections. This is because antibiotics are designed to kill bacteria. They are not effective against viruses.  Follow these instructions at home:    Managing pain and congestion  · Take over-the-counter and prescription medicines only as told by your health care provider.  · If you have a sore throat, gargle with a salt-water mixture 3-4 times a day or as needed. To make a salt-water mixture, completely dissolve ½-1 tsp of salt in 1 cup of warm water.  · Use nose drops made from salt water to ease congestion and soften raw skin around your nose.  · Drink enough fluid to keep your urine pale yellow. This helps prevent dehydration and helps loosen up mucus.  General instructions  · Rest as much as possible.  · Do not drink alcohol.  · Do not use any products that contain nicotine or tobacco, such as cigarettes and e-cigarettes. If you need help quitting, ask your health care provider.  · Keep all follow-up visits as told by your health care provider. This is important.  How is this prevented?    · Get an annual flu shot. You may get the flu shot in late summer, fall, or  winter. Ask your health care provider when you should get your flu shot.  · Avoid exposing others to your respiratory infection.  ? Stay home from work or school as told by your health care provider.  ? Wash your hands with soap and water often, especially after you cough or sneeze. If soap and water are not available, use alcohol-based hand .  · Avoid contact with people who are sick during cold and flu season. This is generally fall and winter.  Contact a health care provider if:  · Your symptoms last for 10 days or longer.  · Your symptoms get worse over time.  · You have a fever.  · You have severe sinus pain in your face or forehead.  · The glands in your jaw or neck become very swollen.  Get help right away if you:  · Feel pain or pressure in your chest.  · Have shortness of breath.  · Faint or feel like you will faint.  · Have severe and persistent vomiting.  · Feel confused or disoriented.  Summary  · A respiratory infection is an illness that affects part of the respiratory system, such as the lungs, nose, or throat. A respiratory infection that is caused by a virus is called a viral respiratory infection.  · Common types of viral respiratory infections are a cold, influenza, and respiratory syncytial virus (RSV) infection.  · Symptoms of this condition include a stuffy or runny nose, cough, sneezing, fatigue, achy muscles, sore throat, and fevers or chills.  · Antibiotic medicines are not prescribed for viral infections. This is because antibiotics are designed to kill bacteria. They are not effective against viruses.  This information is not intended to replace advice given to you by your health care provider. Make sure you discuss any questions you have with your health care provider.  Document Revised: 12/26/2019 Document Reviewed: 01/28/2019  [employer] Patient Education © 2021 [employer]

## 2021-09-01 NOTE — PROGRESS NOTES
Subjective   Chief Complaint   Patient presents with   • Exposure To Known Illness   • DEANDRE Polanco is a 55 y.o. female.     Pt reports COVID exposure from her son who tested positive for COVID yesterday. She reports symptoms of sore throat, cough and HA x 2 days. She has been fully vaccinated against COVID    URI   This is a new problem. Episode onset: 2 days. The problem has been unchanged. There has been no fever. Associated symptoms include coughing, headaches and a sore throat. Pertinent negatives include no abdominal pain, chest pain, congestion, diarrhea, dysuria, ear pain, joint pain, joint swelling, nausea, neck pain, plugged ear sensation, rash, rhinorrhea, sinus pain, sneezing, swollen glands, vomiting or wheezing.        Allergies   Allergen Reactions   • Penicillins Other (See Comments)     Childhood allergy       Past Medical History:   Diagnosis Date   • Adrenal adenoma    • Anxiety    • History of diverticulitis    • Insomnia    • Menopausal symptoms    • Pneumonia    • Rosacea    • Steatosis, liver    • Vitamin D deficiency        Past Surgical History:   Procedure Laterality Date   •  SECTION     • CHOLECYSTECTOMY     • COLONOSCOPY  10/28/2016    nl with Dr. Everett Botello   • ENDOSCOPY N/A 5/10/2021    Procedure: ESOPHAGOGASTRODUODENOSCOPY WITH BIOPSY;  Surgeon: Dakota Nowak MD;  Location: [facility] MAIN OR;  Service: Gastroenterology;  Laterality: N/A;  GASTRIC POLYPS, SMALL HIATAL HERNIA, ESOPHAGITIS       Social History     Socioeconomic History   • Marital status:      Spouse name: Not on file   • Number of children: Not on file   • Years of education: Not on file   • Highest education level: Not on file   Tobacco Use   • Smoking status: Never Smoker   • Smokeless tobacco: Never Used   Vaping Use   • Vaping Use: Never used   Substance and Sexual Activity   • Alcohol use: Yes     Comment: social   • Drug use: Never   • Sexual activity: Defer       Family  History   Problem Relation Age of Onset   • Other Mother         ESRD (end stage renal disease)   • Heart failure Mother         chronic congestive heart failure   • Diabetes Mother    • Hyperlipidemia Mother    • Hypertension Mother    • Pneumonia Mother    • Skin cancer Mother    • Diverticulitis Father    • Skin cancer Father    • Colon polyps Father    • Diabetes Sister    • Diabetes Brother    • Other Maternal Aunt         malignant neoplasm of breast    • Crohn's disease Other    • Breast cancer Neg Hx    • Colon cancer Neg Hx    • Irritable bowel syndrome Neg Hx    • Ulcerative colitis Neg Hx          Current Outpatient Medications:   •  amLODIPine (NORVASC) 5 MG tablet, TAKE 1 TABLET BY MOUTH DAILY, Disp: 30 tablet, Rfl: 6  •  buPROPion XL (WELLBUTRIN XL) 300 MG 24 hr tablet, Take 1 tablet by mouth Daily., Disp: 90 tablet, Rfl: 1  •  omeprazole (priLOSEC) 40 MG capsule, Take 1 capsule by mouth Daily., Disp: 30 capsule, Rfl: 5  •  Premarin 0.625 MG/GM vaginal cream, , Disp: , Rfl:   •  traZODone (DESYREL) 50 MG tablet, Take 1 tablet by mouth Every Night., Disp: 30 tablet, Rfl: 5      Review of Systems   Constitutional: Negative for chills, diaphoresis, fatigue and fever.   HENT: Positive for sore throat. Negative for congestion, ear pain, rhinorrhea, sneezing and swollen glands.    Respiratory: Positive for cough. Negative for chest tightness, shortness of breath and wheezing.    Cardiovascular: Negative for chest pain.   Gastrointestinal: Negative for abdominal pain, diarrhea, nausea and vomiting.   Genitourinary: Negative for dysuria.   Musculoskeletal: Negative for joint pain, myalgias and neck pain.   Skin: Negative for rash.   Neurological: Positive for headache.        There were no vitals filed for this visit.    Objective   Physical Exam  Constitutional:       General: She is not in acute distress.     Appearance: Normal appearance. She is not ill-appearing, toxic-appearing or diaphoretic.   HENT:       Head: Normocephalic and atraumatic.      Nose: Nose normal.      Comments: Per pt       Mouth/Throat:      Lips: Pink.      Mouth: Mucous membranes are moist.   Pulmonary:      Effort: Pulmonary effort is normal.   Neurological:      Mental Status: She is alert and oriented to person, place, and time.   Psychiatric:         Mood and Affect: Mood normal.         Speech: Speech normal.         Behavior: Behavior normal.          Procedures     Assessment/Plan   Diagnoses and all orders for this visit:    1. Viral upper respiratory tract infection (Primary)  -     COVID-19,[facility] ROUTINE, NP/OP SWAB IN TRANSPORT MEDIA OR ESWAB 72 HR TAT - Swab, Nasopharynx; Future    2. Exposure to COVID-19 virus  -     COVID-19,[facility] ROUTINE, NP/OP SWAB IN TRANSPORT MEDIA OR ESWAB 72 HR TAT - Swab, Nasopharynx; Future      Due to your symptoms I have ordered a COVID-19 test for you to rule this out. Please go to your nearest [facility] for COVID-19 testing. When you arrive and let them know you have an order. We will call you with the results from a BLOCKED NUMBER, usually within 1-3 days.     SELF QUARANTINE until you meet the following criteria:   -It has been at least 10 days from the onset of your symptoms,   -A minimum of 24 hours fever free without fever reducing medicine   -AND improved symptoms   **Wear a cloth or surgical mask for 14 days or until symptoms have resolved**      Treat symptoms as you would with any cold or viral illness.   Alternate tylenol and motrin for pain and/or fever, stay hydrated and rest.   If symptoms worsen or do not improve follow up with your PCP or visit your nearest Urgent Care Center or ER.        PLAN: Discussed dosing, side effects, recommended other symptomatic care.  Patient should follow up with primary care provider if symptoms worsen, fail to resolve or other symptoms need attention. Patient/family agree to the above.     I spent 20 minutes caring for Mariela on this  date of service. This time includes time spent by me in the following activities:preparing for the visit, obtaining and/or reviewing a separately obtained history, performing a medically appropriate examination and/or evaluation , counseling and educating the patient/family/caregiver, ordering medications, tests, or procedures and documenting information in the medical record    ARTURO Poole     This visit was performed via Telehealth.  This patient has been instructed to follow-up with their primary care provider if their symptoms worsen or the treatment provided does not resolve their illness.

## 2022-02-09 ENCOUNTER — TRANSCRIBE ORDERS (OUTPATIENT)
Dept: ADMINISTRATIVE | Facility: HOSPITAL | Age: 57
End: 2022-02-09

## 2022-02-09 DIAGNOSIS — Z12.39 SCREENING BREAST EXAMINATION: Primary | ICD-10-CM

## 2022-02-11 ENCOUNTER — HOSPITAL ENCOUNTER (OUTPATIENT)
Dept: MAMMOGRAPHY | Facility: HOSPITAL | Age: 57
Discharge: HOME | End: 2022-02-11
Admitting: INTERNAL MEDICINE

## 2022-02-11 DIAGNOSIS — Z12.39 SCREENING BREAST EXAMINATION: ICD-10-CM

## 2022-02-11 PROCEDURE — 77067 SCR MAMMO BI INCL CAD: CPT

## 2022-02-11 PROCEDURE — 77063 BREAST TOMOSYNTHESIS BI: CPT

## 2022-02-17 ENCOUNTER — LAB (OUTPATIENT)
Dept: LAB | Facility: HOSPITAL | Age: 57
End: 2022-02-17

## 2022-02-17 PROCEDURE — 80053 COMPREHEN METABOLIC PANEL: CPT | Performed by: INTERNAL MEDICINE

## 2022-02-17 PROCEDURE — 80061 LIPID PANEL: CPT | Performed by: INTERNAL MEDICINE

## 2022-02-17 PROCEDURE — 85025 COMPLETE CBC W/AUTO DIFF WBC: CPT | Performed by: INTERNAL MEDICINE

## 2022-02-17 PROCEDURE — 83036 HEMOGLOBIN GLYCOSYLATED A1C: CPT | Performed by: INTERNAL MEDICINE

## 2022-02-22 DIAGNOSIS — I10 ESSENTIAL HYPERTENSION: ICD-10-CM

## 2022-02-22 DIAGNOSIS — G47.00 INSOMNIA, UNSPECIFIED TYPE: ICD-10-CM

## 2022-02-22 DIAGNOSIS — F32.5 MAJOR DEPRESSIVE DISORDER IN FULL REMISSION, UNSPECIFIED WHETHER RECURRENT: ICD-10-CM

## 2022-02-22 RX ORDER — CONJUGATED ESTROGENS 0.62 MG/G
CREAM VAGINAL
Status: CANCELLED | OUTPATIENT
Start: 2022-02-22

## 2022-02-22 RX ORDER — AMLODIPINE BESYLATE 5 MG/1
5 TABLET ORAL DAILY
Qty: 30 TABLET | Refills: 6 | Status: SHIPPED | OUTPATIENT
Start: 2022-02-22 | End: 2022-08-22 | Stop reason: SDUPTHER

## 2022-02-22 RX ORDER — BUPROPION HYDROCHLORIDE 300 MG/1
300 TABLET ORAL DAILY
Qty: 90 TABLET | Refills: 1 | Status: SHIPPED | OUTPATIENT
Start: 2022-02-22 | End: 2022-09-12 | Stop reason: SDUPTHER

## 2022-02-22 RX ORDER — TRAZODONE HYDROCHLORIDE 50 MG/1
50 TABLET ORAL NIGHTLY
Qty: 30 TABLET | Refills: 5 | Status: SHIPPED | OUTPATIENT
Start: 2022-02-22 | End: 2022-08-22

## 2022-02-22 RX ORDER — OMEPRAZOLE 40 MG/1
40 CAPSULE, DELAYED RELEASE ORAL DAILY
Qty: 30 CAPSULE | Refills: 5 | Status: SHIPPED | OUTPATIENT
Start: 2022-02-22 | End: 2022-02-24

## 2022-02-22 NOTE — TELEPHONE ENCOUNTER
Caller: Ceesamuelneal Mariela JESSICA    Relationship: Self    Best call back number: [phone]    Requested Prescriptions:   Requested Prescriptions     Pending Prescriptions Disp Refills   • traZODone (DESYREL) 50 MG tablet 30 tablet 5     Sig: Take 1 tablet by mouth Every Night.   • Premarin 0.625 MG/GM vaginal cream     • omeprazole (priLOSEC) 40 MG capsule 30 capsule 5     Sig: Take 1 capsule by mouth Daily.   • buPROPion XL (WELLBUTRIN XL) 300 MG 24 hr tablet 90 tablet 1     Sig: Take 1 tablet by mouth Daily.   • amLODIPine (NORVASC) 5 MG tablet 30 tablet 6     Sig: Take 1 tablet by mouth Daily.        Pharmacy where request should be sent: [facility] [address] - [phone] [facility] [phone] FX     Additional details provided by patient: THE PATIENT WILL NEED TO CHANGE HE PHARMACY TO THE [facility] ON FILE. PLEASE ADVISE.     Does the patient have less than a 3 day supply:  [x] Yes  [] No    Wiliam Gonzalez Rep   02/22/22 10:27 EST

## 2022-02-24 ENCOUNTER — OFFICE VISIT (OUTPATIENT)
Dept: INTERNAL MEDICINE | Facility: CLINIC | Age: 57
End: 2022-02-24

## 2022-02-24 VITALS
TEMPERATURE: 97.8 F | DIASTOLIC BLOOD PRESSURE: 76 MMHG | BODY MASS INDEX: 29.35 KG/M2 | HEIGHT: 66 IN | WEIGHT: 182.6 LBS | SYSTOLIC BLOOD PRESSURE: 122 MMHG

## 2022-02-24 DIAGNOSIS — Z83.71 FH: COLON POLYPS: Primary | ICD-10-CM

## 2022-02-24 DIAGNOSIS — K21.9 GASTROESOPHAGEAL REFLUX DISEASE, UNSPECIFIED WHETHER ESOPHAGITIS PRESENT: ICD-10-CM

## 2022-02-24 DIAGNOSIS — R73.03 PRE-DIABETES: ICD-10-CM

## 2022-02-24 DIAGNOSIS — I10 ESSENTIAL HYPERTENSION: Chronic | ICD-10-CM

## 2022-02-24 PROBLEM — D50.9 IRON DEFICIENCY ANEMIA: Status: RESOLVED | Noted: 2019-07-22 | Resolved: 2022-02-24

## 2022-02-24 PROCEDURE — 99214 OFFICE O/P EST MOD 30 MIN: CPT | Performed by: INTERNAL MEDICINE

## 2022-02-24 RX ORDER — PANTOPRAZOLE SODIUM 40 MG/1
40 TABLET, DELAYED RELEASE ORAL DAILY
COMMUNITY
End: 2022-02-24 | Stop reason: SDUPTHER

## 2022-02-24 RX ORDER — PANTOPRAZOLE SODIUM 40 MG/1
40 TABLET, DELAYED RELEASE ORAL DAILY
Qty: 90 TABLET | Refills: 3 | Status: SHIPPED | OUTPATIENT
Start: 2022-02-24 | End: 2022-09-12 | Stop reason: SDUPTHER

## 2022-02-24 NOTE — PROGRESS NOTES
Subjective        Chief Complaint   Patient presents with   • Hypertension           Mariela Polanco is a 56 y.o. female who presents for    Patient Active Problem List   Diagnosis   • Essential hypertension   • Depression   • Gastroesophageal reflux disease   • Pre-diabetes   • FH: colon polyps       History of Present Illness     She has not been having reflux. Her BP has been 118/80.  Allergies   Allergen Reactions   • Penicillins Other (See Comments)     Childhood allergy       Current Outpatient Medications on File Prior to Visit   Medication Sig Dispense Refill   • amLODIPine (NORVASC) 5 MG tablet Take 1 tablet by mouth Daily. 30 tablet 6   • buPROPion XL (WELLBUTRIN XL) 300 MG 24 hr tablet Take 1 tablet by mouth Daily. 90 tablet 1   • Premarin 0.625 MG/GM vaginal cream      • traZODone (DESYREL) 50 MG tablet Take 1 tablet by mouth Every Night. 30 tablet 5   • [DISCONTINUED] pantoprazole (PROTONIX) 40 MG EC tablet Take 40 mg by mouth Daily.     • [DISCONTINUED] omeprazole (priLOSEC) 40 MG capsule Take 1 capsule by mouth Daily. 30 capsule 5     No current facility-administered medications on file prior to visit.       Past Medical History:   Diagnosis Date   • Adrenal adenoma    • Anxiety    • History of diverticulitis    • Insomnia    • Iron deficiency anemia 2019   • Menopausal symptoms    • Pneumonia    • Rosacea    • Steatosis, liver    • Vitamin D deficiency        Past Surgical History:   Procedure Laterality Date   •  SECTION     • CHOLECYSTECTOMY     • COLONOSCOPY  10/28/2016    nl with Dr. Everett Botello   • ENDOSCOPY N/A 5/10/2021    Procedure: ESOPHAGOGASTRODUODENOSCOPY WITH BIOPSY;  Surgeon: Dakota Nowak MD;  Location: [facility] MAIN OR;  Service: Gastroenterology;  Laterality: N/A;  GASTRIC POLYPS, SMALL HIATAL HERNIA, ESOPHAGITIS       Family History   Problem Relation Age of Onset   • Other Mother         ESRD (end stage renal disease)   • Heart failure Mother         chronic  "congestive heart failure   • Diabetes Mother    • Hyperlipidemia Mother    • Hypertension Mother    • Pneumonia Mother    • Skin cancer Mother    • Diverticulitis Father    • Skin cancer Father    • Colon polyps Father    • Diabetes Sister    • Diabetes Brother    • Other Maternal Aunt         malignant neoplasm of breast    • Crohn's disease Other    • Breast cancer Neg Hx    • Colon cancer Neg Hx    • Irritable bowel syndrome Neg Hx    • Ulcerative colitis Neg Hx        Social History     Socioeconomic History   • Marital status:    Tobacco Use   • Smoking status: Never Smoker   • Smokeless tobacco: Never Used   Vaping Use   • Vaping Use: Never used   Substance and Sexual Activity   • Alcohol use: Yes     Comment: social   • Drug use: Never   • Sexual activity: Defer           The following portions of the patient's history were reviewed and updated as appropriate: problem list, allergies, current medications, past medical history, past family history, past social history and past surgical history.    Review of Systems    Immunization History   Administered Date(s) Administered   • COVID-19 (MODERNA) 1st, 2nd, 3rd Dose Only 03/11/2021, 04/08/2021, 11/15/2021   • Flu Vaccine Intradermal Quad 18-64YR 11/27/2017, 11/12/2018, 09/15/2020   • Flu Vaccine Quad PF >36MO 12/27/2019, 12/07/2021   • FluLaval/Fluarix/Fluzone >6 12/27/2019   • Hepatitis A 12/19/2017, 11/12/2018   • Hepatitis B 03/01/2018, 05/11/2018, 12/10/2018   • Shingrix 12/07/2021   • Tdap 11/27/2017   • Typhoid, Unspecified 12/19/2017       Objective   Vitals:    02/24/22 0754   BP: 122/76   Temp: 97.8 °F (36.6 °C)   Weight: 82.8 kg (182 lb 9.6 oz)   Height: 167.6 cm (65.98\")     Body mass index is 29.49 kg/m².  Physical Exam  Vitals reviewed.   Constitutional:       Appearance: She is well-developed.   HENT:      Head: Normocephalic and atraumatic.   Cardiovascular:      Rate and Rhythm: Normal rate and regular rhythm.      Heart sounds: Normal " heart sounds, S1 normal and S2 normal.   Pulmonary:      Effort: Pulmonary effort is normal.      Breath sounds: Normal breath sounds.   Skin:     General: Skin is warm.   Neurological:      Mental Status: She is alert.   Psychiatric:         Behavior: Behavior normal.         Procedures    Assessment/Plan   Diagnoses and all orders for this visit:    1. FH: colon polyps (Primary)  Comments:  She will call Dr. Nowak to get her cscope.    2. Gastroesophageal reflux disease, unspecified whether esophagitis present  -     pantoprazole (PROTONIX) 40 MG EC tablet; Take 1 tablet by mouth Daily.  Dispense: 90 tablet; Refill: 3    3. Pre-diabetes  -     Hemoglobin A1c; Future    4. Essential hypertension  -     Comprehensive Metabolic Panel; Future               Reviewed cbc, cmp, flp and a1c. BP is good and reflux is as well. Discussed exercising 150 minutes per week. A1c is excellent.  Return in about 6 months (around 8/24/2022) for Annual physical, Lab Before FUP.

## 2022-03-10 ENCOUNTER — TELEPHONE (OUTPATIENT)
Dept: GASTROENTEROLOGY | Facility: CLINIC | Age: 57
End: 2022-03-10

## 2022-03-10 RX ORDER — CIPROFLOXACIN 500 MG/1
500 TABLET, FILM COATED ORAL 2 TIMES DAILY
Qty: 20 TABLET | Refills: 0 | Status: SHIPPED | OUTPATIENT
Start: 2022-03-10 | End: 2022-03-20

## 2022-03-10 RX ORDER — METRONIDAZOLE 500 MG/1
500 TABLET ORAL 3 TIMES DAILY
Qty: 30 TABLET | Refills: 0 | Status: SHIPPED | OUTPATIENT
Start: 2022-03-10 | End: 2022-03-20

## 2022-03-10 NOTE — PROGRESS NOTES
"Chief Complaint   Patient presents with   • Diverticulitis         History of Present Illness  Patient is a 56-year-old female who presents today for follow-up. She has a history of GERD, esophagitis, diverticulitis.     Patient presents today for follow-up.  She started this week with a recurrent episode of diverticulitis.  She reports she had some mild left lower quadrant discomfort and then became somewhat constipated.  The constipation resolved but she then began experiencing worsening left lower quadrant pain and diarrhea.  The symptoms were consistent with her prior episodes of diverticulitis.  She has not had a fever.  She started ciprofloxacin and metronidazole yesterday and feels somewhat better today.    She denies any blood in the stool or dark stool.  Her last colonoscopy was in 2016.  She denies any family history of colon cancer but her father has had colon polyps.    She reports overall upper GI symptoms have improved since starting pantoprazole.       Result Review :       Office Visit with Poppy Avina APRN (06/18/2021)   Tissue Pathology Exam (05/10/2021 08:31)   UPPER GI ENDOSCOPY (05/10/2021 08:24)   SCANNED - COLONOSCOPY (10/28/2016)   Office Visit with Dakota Nowak MD (04/12/2021)       Vital Signs:   /78   Pulse 73   Temp 98.2 °F (36.8 °C)   Ht 167.6 cm (66\")   Wt 80.6 kg (177 lb 12.8 oz)   SpO2 97%   BMI 28.70 kg/m²     Body mass index is 28.7 kg/m².     Physical Exam  Vitals reviewed.   Constitutional:       General: She is not in acute distress.     Appearance: She is well-developed.   HENT:      Head: Normocephalic and atraumatic.   Pulmonary:      Effort: Pulmonary effort is normal. No respiratory distress.   Abdominal:      General: Abdomen is flat. Bowel sounds are normal. There is no distension.      Palpations: Abdomen is soft.      Tenderness: There is abdominal tenderness in the left lower quadrant.   Skin:     General: Skin is dry.      Coloration: Skin is " not pale.   Neurological:      Mental Status: She is alert and oriented to person, place, and time.   Psychiatric:         Thought Content: Thought content normal.           Assessment and Plan    Diagnoses and all orders for this visit:    1. Diverticulitis (Primary)    2. Family history of polyps in the colon         Discussion  Patient presents today for follow-up with recurrent episode of diverticulitis.  We will continue oral antibiotic therapy. If symptoms do not improve or worsen, patient instructed to notify the office and at that point would recommend updated CT scan.  We will schedule follow-up colonoscopy to be done at least 4 weeks after diverticulitis is treated.  Reviewed recommendation for high-fiber diet or fiber supplement to help prevent recurrence.          Follow Up   Return if symptoms worsen or fail to improve.    Patient Instructions   For diverticulitis, continue ciprofloxacin and metronidazole as prescribed.    We will schedule colonoscopy for updated colon cancer screening and to follow-up after episode of diverticulitis.    For diverticulosis, follow a high-fiber diet.  Consider starting a daily fiber supplement, such as Metamucil or Citrucel, available over-the-counter.

## 2022-03-10 NOTE — TELEPHONE ENCOUNTER
I sent in prescription for ciprofloxacin & metronidazole for her to get started on. Please also schedule a follow up visit.

## 2022-03-11 ENCOUNTER — OFFICE VISIT (OUTPATIENT)
Dept: GASTROENTEROLOGY | Facility: CLINIC | Age: 57
End: 2022-03-11

## 2022-03-11 ENCOUNTER — PREP FOR SURGERY (OUTPATIENT)
Dept: SURGERY | Facility: SURGERY CENTER | Age: 57
End: 2022-03-11

## 2022-03-11 VITALS
WEIGHT: 177.8 LBS | HEART RATE: 73 BPM | OXYGEN SATURATION: 97 % | SYSTOLIC BLOOD PRESSURE: 130 MMHG | DIASTOLIC BLOOD PRESSURE: 78 MMHG | TEMPERATURE: 98.2 F | HEIGHT: 66 IN | BODY MASS INDEX: 28.57 KG/M2

## 2022-03-11 DIAGNOSIS — K57.92 DIVERTICULITIS: ICD-10-CM

## 2022-03-11 DIAGNOSIS — Z83.71 FAMILY HISTORY OF COLONIC POLYPS: ICD-10-CM

## 2022-03-11 DIAGNOSIS — K57.92 DIVERTICULITIS: Primary | ICD-10-CM

## 2022-03-11 DIAGNOSIS — Z12.11 ENCOUNTER FOR SCREENING FOR MALIGNANT NEOPLASM OF COLON: Primary | ICD-10-CM

## 2022-03-11 DIAGNOSIS — Z83.71 FAMILY HISTORY OF POLYPS IN THE COLON: ICD-10-CM

## 2022-03-11 PROCEDURE — 99214 OFFICE O/P EST MOD 30 MIN: CPT | Performed by: NURSE PRACTITIONER

## 2022-03-11 RX ORDER — SODIUM CHLORIDE 0.9 % (FLUSH) 0.9 %
10 SYRINGE (ML) INJECTION AS NEEDED
Status: CANCELLED | OUTPATIENT
Start: 2022-03-11

## 2022-03-11 RX ORDER — SODIUM CHLORIDE, SODIUM LACTATE, POTASSIUM CHLORIDE, CALCIUM CHLORIDE 600; 310; 30; 20 MG/100ML; MG/100ML; MG/100ML; MG/100ML
30 INJECTION, SOLUTION INTRAVENOUS CONTINUOUS PRN
Status: CANCELLED | OUTPATIENT
Start: 2022-03-11

## 2022-03-11 RX ORDER — SODIUM CHLORIDE 0.9 % (FLUSH) 0.9 %
3 SYRINGE (ML) INJECTION EVERY 12 HOURS SCHEDULED
Status: CANCELLED | OUTPATIENT
Start: 2022-03-11

## 2022-03-11 NOTE — PATIENT INSTRUCTIONS
For diverticulitis, continue ciprofloxacin and metronidazole as prescribed.    We will schedule colonoscopy for updated colon cancer screening and to follow-up after episode of diverticulitis.    For diverticulosis, follow a high-fiber diet.  Consider starting a daily fiber supplement, such as Metamucil or Citrucel, available over-the-counter.

## 2022-03-27 NOTE — ANESTHESIA PREPROCEDURE EVALUATION
Anesthesia Evaluation     Patient summary reviewed   NPO Solid Status: > 8 hours  NPO Liquid Status: > 2 hours           Airway   Mallampati: I  TM distance: >3 FB  Neck ROM: full  Dental      Pulmonary     breath sounds clear to auscultation  (-) shortness of breath, not a smoker  Cardiovascular   Exercise tolerance: good (4-7 METS)    Rhythm: regular  Rate: normal    (+) hypertension,   (-) angina, [place]    ROS comment: 3/23/18 stress echo read as normal    Neuro/Psych  (+) psychiatric history Anxiety and Depression,     GI/Hepatic/Renal/Endo    (+)  GERD,  liver disease,     ROS Comment: Adrenal adenoma    Musculoskeletal     Abdominal    Substance History      OB/GYN          Other                      Anesthesia Plan    ASA 2     MAC   (MAC anesthesia discussed with patient and/or patient representative. Risks (including but not limited to intra-op awareness), benefits, and alternatives were discussed. Understanding was voiced with an agreement to proceed with a MAC technique and General as a backup option.   )  intravenous induction     Anesthetic plan, all risks, benefits, and alternatives have been provided, discussed and informed consent has been obtained with: patient.      
show

## 2022-08-19 DIAGNOSIS — G47.00 INSOMNIA, UNSPECIFIED TYPE: ICD-10-CM

## 2022-08-22 DIAGNOSIS — I10 ESSENTIAL HYPERTENSION: ICD-10-CM

## 2022-08-22 RX ORDER — TRAZODONE HYDROCHLORIDE 50 MG/1
TABLET ORAL
Qty: 90 TABLET | Refills: 0 | Status: SHIPPED | OUTPATIENT
Start: 2022-08-22 | End: 2022-12-06 | Stop reason: SDUPTHER

## 2022-08-22 RX ORDER — AMLODIPINE BESYLATE 5 MG/1
5 TABLET ORAL DAILY
Qty: 30 TABLET | Refills: 6 | Status: SHIPPED | OUTPATIENT
Start: 2022-08-22 | End: 2022-09-12 | Stop reason: SDUPTHER

## 2022-09-11 DIAGNOSIS — F32.5 MAJOR DEPRESSIVE DISORDER IN FULL REMISSION, UNSPECIFIED WHETHER RECURRENT: ICD-10-CM

## 2022-09-12 ENCOUNTER — TELEPHONE (OUTPATIENT)
Dept: INTERNAL MEDICINE | Facility: CLINIC | Age: 57
End: 2022-09-12

## 2022-09-12 DIAGNOSIS — K21.9 GASTROESOPHAGEAL REFLUX DISEASE, UNSPECIFIED WHETHER ESOPHAGITIS PRESENT: ICD-10-CM

## 2022-09-12 DIAGNOSIS — I10 ESSENTIAL HYPERTENSION: ICD-10-CM

## 2022-09-12 DIAGNOSIS — F32.5 MAJOR DEPRESSIVE DISORDER IN FULL REMISSION, UNSPECIFIED WHETHER RECURRENT: ICD-10-CM

## 2022-09-12 RX ORDER — BUPROPION HYDROCHLORIDE 300 MG/1
300 TABLET ORAL DAILY
Qty: 90 TABLET | Refills: 1 | Status: SHIPPED | OUTPATIENT
Start: 2022-09-12 | End: 2022-12-06 | Stop reason: SDUPTHER

## 2022-09-12 RX ORDER — BUPROPION HYDROCHLORIDE 300 MG/1
TABLET ORAL
Qty: 90 TABLET | Refills: 0 | OUTPATIENT
Start: 2022-09-12

## 2022-09-12 RX ORDER — AMLODIPINE BESYLATE 5 MG/1
5 TABLET ORAL DAILY
Qty: 90 TABLET | Refills: 1 | Status: SHIPPED | OUTPATIENT
Start: 2022-09-12 | End: 2022-12-06 | Stop reason: SDUPTHER

## 2022-09-12 RX ORDER — PANTOPRAZOLE SODIUM 40 MG/1
40 TABLET, DELAYED RELEASE ORAL DAILY
Qty: 90 TABLET | Refills: 3 | Status: SHIPPED | OUTPATIENT
Start: 2022-09-12 | End: 2022-12-06 | Stop reason: SDUPTHER

## 2022-09-12 NOTE — TELEPHONE ENCOUNTER
Caller: Mariela Polanco    Relationship: Self    Best call back number: [phone]    What orders are you requesting (i.e. lab or imaging): LABS     In what timeframe would the patient need to come in: BEFORE 12/6/22    Where will you receive your lab/imaging services: [facility]     Additional notes: HAD TO RESCHEDULE APPOINTMENT ON 9/8/22 AND NEXT AVAILABLE WAS 12/6. STATES USUALLY GETS LABS DRAWN A WEEK BEFORE

## 2022-11-10 ENCOUNTER — TELEPHONE (OUTPATIENT)
Dept: GASTROENTEROLOGY | Facility: CLINIC | Age: 57
End: 2022-11-10

## 2022-11-10 RX ORDER — METRONIDAZOLE 500 MG/1
500 TABLET ORAL 3 TIMES DAILY
Qty: 30 TABLET | Refills: 0 | Status: SHIPPED | OUTPATIENT
Start: 2022-11-10 | End: 2022-11-21

## 2022-11-10 RX ORDER — CIPROFLOXACIN 500 MG/1
500 TABLET, FILM COATED ORAL 2 TIMES DAILY
Qty: 20 TABLET | Refills: 0 | Status: SHIPPED | OUTPATIENT
Start: 2022-11-10 | End: 2022-11-21

## 2022-11-10 NOTE — TELEPHONE ENCOUNTER
I sent in prescription for ciprofloxacin and metronidazole for her pharmacy for her to get started on for diverticulitis.  Please schedule follow-up visit with our office in 1 to 2 weeks for reassessment.

## 2022-11-10 NOTE — TELEPHONE ENCOUNTER
Patient is having a Diverticulitis would like an RX for antibiotics,just pain,  no fever please advise.

## 2022-11-21 ENCOUNTER — LAB (OUTPATIENT)
Dept: LAB | Facility: HOSPITAL | Age: 57
End: 2022-11-21

## 2022-11-21 ENCOUNTER — PREP FOR SURGERY (OUTPATIENT)
Dept: SURGERY | Facility: SURGERY CENTER | Age: 57
End: 2022-11-21

## 2022-11-21 ENCOUNTER — OFFICE VISIT (OUTPATIENT)
Dept: GASTROENTEROLOGY | Facility: CLINIC | Age: 57
End: 2022-11-21

## 2022-11-21 VITALS
OXYGEN SATURATION: 97 % | SYSTOLIC BLOOD PRESSURE: 134 MMHG | DIASTOLIC BLOOD PRESSURE: 86 MMHG | HEART RATE: 69 BPM | BODY MASS INDEX: 29.28 KG/M2 | TEMPERATURE: 99.3 F | HEIGHT: 66 IN | WEIGHT: 182.2 LBS

## 2022-11-21 DIAGNOSIS — Z83.71 FH: COLON POLYPS: ICD-10-CM

## 2022-11-21 DIAGNOSIS — Z83.71 FH: COLON POLYPS: Primary | ICD-10-CM

## 2022-11-21 DIAGNOSIS — K21.9 GASTROESOPHAGEAL REFLUX DISEASE, UNSPECIFIED WHETHER ESOPHAGITIS PRESENT: Primary | ICD-10-CM

## 2022-11-21 DIAGNOSIS — R79.89 ELEVATED LIVER FUNCTION TESTS: Primary | ICD-10-CM

## 2022-11-21 DIAGNOSIS — K57.90 DIVERTICULOSIS: ICD-10-CM

## 2022-11-21 DIAGNOSIS — Z12.11 ENCOUNTER FOR SCREENING FOR MALIGNANT NEOPLASM OF COLON: ICD-10-CM

## 2022-11-21 DIAGNOSIS — R19.4 CHANGE IN BOWEL HABITS: ICD-10-CM

## 2022-11-21 DIAGNOSIS — K57.92 DIVERTICULITIS: ICD-10-CM

## 2022-11-21 LAB
ALBUMIN SERPL-MCNC: 4.4 G/DL (ref 3.5–5.2)
ALBUMIN/GLOB SERPL: 1.4 G/DL
ALP SERPL-CCNC: 48 U/L (ref 39–117)
ALT SERPL W P-5'-P-CCNC: 44 U/L (ref 1–33)
ANION GAP SERPL CALCULATED.3IONS-SCNC: 8.6 MMOL/L (ref 5–15)
AST SERPL-CCNC: 44 U/L (ref 1–32)
BASOPHILS # BLD AUTO: 0.07 10*3/MM3 (ref 0–0.2)
BASOPHILS NFR BLD AUTO: 0.9 % (ref 0–1.5)
BILIRUB SERPL-MCNC: 0.4 MG/DL (ref 0–1.2)
BUN SERPL-MCNC: 14 MG/DL (ref 6–20)
BUN/CREAT SERPL: 18.4 (ref 7–25)
CALCIUM SPEC-SCNC: 10 MG/DL (ref 8.6–10.5)
CHLORIDE SERPL-SCNC: 105 MMOL/L (ref 98–107)
CO2 SERPL-SCNC: 26.4 MMOL/L (ref 22–29)
CREAT SERPL-MCNC: 0.76 MG/DL (ref 0.57–1)
DEPRECATED RDW RBC AUTO: 41.5 FL (ref 37–54)
EGFRCR SERPLBLD CKD-EPI 2021: 92.1 ML/MIN/1.73
EOSINOPHIL # BLD AUTO: 0.06 10*3/MM3 (ref 0–0.4)
EOSINOPHIL NFR BLD AUTO: 0.7 % (ref 0.3–6.2)
ERYTHROCYTE [DISTWIDTH] IN BLOOD BY AUTOMATED COUNT: 12.3 % (ref 12.3–15.4)
GLOBULIN UR ELPH-MCNC: 3.2 GM/DL
GLUCOSE SERPL-MCNC: 124 MG/DL (ref 65–99)
HCT VFR BLD AUTO: 40.9 % (ref 34–46.6)
HGB BLD-MCNC: 13.6 G/DL (ref 12–15.9)
IMM GRANULOCYTES # BLD AUTO: 0.04 10*3/MM3 (ref 0–0.05)
IMM GRANULOCYTES NFR BLD AUTO: 0.5 % (ref 0–0.5)
LYMPHOCYTES # BLD AUTO: 2.93 10*3/MM3 (ref 0.7–3.1)
LYMPHOCYTES NFR BLD AUTO: 36.4 % (ref 19.6–45.3)
MCH RBC QN AUTO: 30.4 PG (ref 26.6–33)
MCHC RBC AUTO-ENTMCNC: 33.3 G/DL (ref 31.5–35.7)
MCV RBC AUTO: 91.5 FL (ref 79–97)
MONOCYTES # BLD AUTO: 0.8 10*3/MM3 (ref 0.1–0.9)
MONOCYTES NFR BLD AUTO: 9.9 % (ref 5–12)
NEUTROPHILS NFR BLD AUTO: 4.15 10*3/MM3 (ref 1.7–7)
NEUTROPHILS NFR BLD AUTO: 51.6 % (ref 42.7–76)
NRBC BLD AUTO-RTO: 0 /100 WBC (ref 0–0.2)
PLATELET # BLD AUTO: 236 10*3/MM3 (ref 140–450)
PMV BLD AUTO: 8.9 FL (ref 6–12)
POTASSIUM SERPL-SCNC: 4.6 MMOL/L (ref 3.5–5.2)
PROT SERPL-MCNC: 7.6 G/DL (ref 6–8.5)
RBC # BLD AUTO: 4.47 10*6/MM3 (ref 3.77–5.28)
SODIUM SERPL-SCNC: 140 MMOL/L (ref 136–145)
WBC NRBC COR # BLD: 8.05 10*3/MM3 (ref 3.4–10.8)

## 2022-11-21 PROCEDURE — 80053 COMPREHEN METABOLIC PANEL: CPT

## 2022-11-21 PROCEDURE — 85025 COMPLETE CBC W/AUTO DIFF WBC: CPT

## 2022-11-21 PROCEDURE — 99214 OFFICE O/P EST MOD 30 MIN: CPT

## 2022-11-21 PROCEDURE — 36415 COLL VENOUS BLD VENIPUNCTURE: CPT

## 2022-11-21 RX ORDER — SODIUM CHLORIDE 0.9 % (FLUSH) 0.9 %
10 SYRINGE (ML) INJECTION AS NEEDED
Status: CANCELLED | OUTPATIENT
Start: 2022-11-21

## 2022-11-21 RX ORDER — SODIUM CHLORIDE 0.9 % (FLUSH) 0.9 %
3 SYRINGE (ML) INJECTION EVERY 12 HOURS SCHEDULED
Status: CANCELLED | OUTPATIENT
Start: 2022-11-21

## 2022-11-21 RX ORDER — SODIUM CHLORIDE, SODIUM LACTATE, POTASSIUM CHLORIDE, CALCIUM CHLORIDE 600; 310; 30; 20 MG/100ML; MG/100ML; MG/100ML; MG/100ML
30 INJECTION, SOLUTION INTRAVENOUS CONTINUOUS PRN
Status: CANCELLED | OUTPATIENT
Start: 2022-11-21

## 2022-11-21 NOTE — PROGRESS NOTES
"Chief Complaint   Patient presents with   • Follow-up     Diverticulitis           History of Present Illness    Patient is a 57-year-old female presents the office today for follow-up evaluation.  Last in office visit was on 3/11/2022 with ARTURO Angulo.  She has significant past medical history of GERD, esophagitis, diverticulitis.    Last EGD was performed on May 10, 2021 with Dr. Nowak, remarkable for a 1 cm hiatal hernia LA grade a esophagitis, multiple gastric polyps, unremarkable duodenum.    Last colonoscopy evaluation performed by Dr. Everett Botello on 10/28/2016 remarkable for multiple small and large mouth diverticula in the entire colon with adequate bowel prep otherwise unremarkable exam.  Next colonoscopy recommended for colon cancer screening in October 2026    Patient presents today for follow-up after treatment for recurrent episode of diverticulitis with ciprofloxacin and metronidazole 10-day regimen on 11/10/2022.  Reports 1-1/2 days of dosing remaining.    Approximately 72 hours after initiation of antibiotic regimen her left lower quadrant abdominal pain significantly improved and denies recurrence of fever or chills since that time.  Denies melena or hematochezia.    Reports that only residual symptom at this time is decreased stool caliber and occasional mild left-sided cramping.    For GERD, she continues to take pantoprazole 40 mg once daily and describes symptoms as well controlled without heartburn, nausea, vomiting, or dysphagia.    Positive family history of colon polyps in father.     Result Review :       UPPER GI ENDOSCOPY (05/10/2021 08:24)      Vital Signs:   /86   Pulse 69   Temp 99.3 °F (37.4 °C)   Ht 167.6 cm (66\")   Wt 82.6 kg (182 lb 3.2 oz)   SpO2 97%   BMI 29.41 kg/m²     Body mass index is 29.41 kg/m².     Physical Exam  Vitals reviewed.   Constitutional:       General: She is not in acute distress.     Appearance: She is well-developed. She is not " ill-appearing or toxic-appearing.   HENT:      Head: Normocephalic and atraumatic.   Pulmonary:      Effort: Pulmonary effort is normal. No respiratory distress.   Abdominal:      General: Abdomen is flat. Bowel sounds are normal. There is no distension.      Palpations: Abdomen is soft. There is no mass.      Tenderness: There is no abdominal tenderness. There is no right CVA tenderness, left CVA tenderness, guarding or rebound.      Hernia: No hernia is present.   Skin:     General: Skin is dry.      Coloration: Skin is not pale.   Neurological:      Mental Status: She is alert and oriented to person, place, and time.   Psychiatric:         Thought Content: Thought content normal.           Assessment and Plan    Diagnoses and all orders for this visit:    1. Gastroesophageal reflux disease, unspecified whether esophagitis present (Primary)    2. Diverticulitis  -     CBC & Differential  -     Comprehensive Metabolic Panel    3. FH: colon polyps           Discussion:    Patient is a pleasant 57-year-old female who presents to the office today for follow-up evaluation after treatment for acute diverticulitis.    For diverticulitis, will assess CBC, CMP today to assess for leukocytosis, assess hemodynamic status, and assess serum electrolytes.  Will call patient once we receive the results and make additional recommendations as indicated.    Encouraged patient to continue with remaining doses of Cipro and metronidazole regimen as prescribed and avoid alcohol for at least 48 hours after last dose of metronidazole.    Offered dicyclomine prescription for occasional abdominal cramping.  Patient declined at this time stating that pain was manageable however would contact office in the future if needed.    Recommend patient undergo follow-up colonoscopy evaluation at least 8 weeks after current acute flare of diverticulitis.  Notified patient to contact her office for any recurrence of symptoms or alarm symptoms that  include melena/hematochezia or fever/chills-if symptoms recur to consider updating CT abdomen pelvis imaging to further assess.    Patient is agreeable to the outlined above treatment plan.  Verbalizes understanding and will contact office for any new or worsening concerns.  All questions answered and support provided.      Patient Instructions   We recommend starting a daily fiber supplement such as FiberCon     These can be purchased over-the-counter, generic brand is fine.  Fiber supplements can take 12 to 72 hours to start working. Make sure to drink 6 to 12 ounces of water or noncarbonated beverage with fiber supplement.     Recommended starting with half of product listed daily dose for 7 days to help reduce risk of abdominal bloating/gas and allow your body to acclimate to fiber diet intake.  If you do not experience any of these adverse effects may increase to daily dose listed on product.      For constipation:    We recommend starting MiraLAX. This is available over-the-counter and generic brand is fine.    MiraLAX works best when taken on a regular basis (daily or every other day) to help promote more regular bowel movements.    Typically patients start with 1/2 -1 capful mixed in 8 ounces of noncarbonated liquid and take once or twice daily.    Maximum is 2 full capfuls daily.  Adjust dose based on your response.         Schedule colonoscopy, orders placed.    Follow-up visit after colonoscopy to review findings and make additional recommendations if needed.     Blood work today     Once we receive these results, our office will contact you to discuss updating your treatment plan as indicated             EMR Dragon/Transcription Disclaimer:  This document has been Dictated utilizing Dragon dictation.

## 2022-12-02 ENCOUNTER — HOSPITAL ENCOUNTER (OUTPATIENT)
Dept: ULTRASOUND IMAGING | Facility: HOSPITAL | Age: 57
Discharge: HOME | End: 2022-12-02

## 2022-12-02 DIAGNOSIS — R79.89 ELEVATED LIVER FUNCTION TESTS: ICD-10-CM

## 2022-12-02 PROCEDURE — 76705 ECHO EXAM OF ABDOMEN: CPT

## 2022-12-06 ENCOUNTER — OFFICE VISIT (OUTPATIENT)
Dept: INTERNAL MEDICINE | Facility: CLINIC | Age: 57
End: 2022-12-06

## 2022-12-06 VITALS
BODY MASS INDEX: 28.61 KG/M2 | TEMPERATURE: 97.3 F | HEIGHT: 66 IN | DIASTOLIC BLOOD PRESSURE: 80 MMHG | SYSTOLIC BLOOD PRESSURE: 120 MMHG | WEIGHT: 178 LBS | HEART RATE: 67 BPM | OXYGEN SATURATION: 97 %

## 2022-12-06 DIAGNOSIS — G47.00 INSOMNIA, UNSPECIFIED TYPE: ICD-10-CM

## 2022-12-06 DIAGNOSIS — K21.9 GASTROESOPHAGEAL REFLUX DISEASE, UNSPECIFIED WHETHER ESOPHAGITIS PRESENT: ICD-10-CM

## 2022-12-06 DIAGNOSIS — I10 ESSENTIAL HYPERTENSION: Chronic | ICD-10-CM

## 2022-12-06 DIAGNOSIS — Z00.00 WELLNESS EXAMINATION: Primary | ICD-10-CM

## 2022-12-06 DIAGNOSIS — Z12.31 ENCOUNTER FOR SCREENING MAMMOGRAM FOR MALIGNANT NEOPLASM OF BREAST: ICD-10-CM

## 2022-12-06 DIAGNOSIS — F32.0 CURRENT MILD EPISODE OF MAJOR DEPRESSIVE DISORDER WITHOUT PRIOR EPISODE: ICD-10-CM

## 2022-12-06 DIAGNOSIS — R73.03 PRE-DIABETES: ICD-10-CM

## 2022-12-06 PROCEDURE — 99396 PREV VISIT EST AGE 40-64: CPT | Performed by: INTERNAL MEDICINE

## 2022-12-06 RX ORDER — PANTOPRAZOLE SODIUM 40 MG/1
40 TABLET, DELAYED RELEASE ORAL DAILY
Qty: 90 TABLET | Refills: 3 | Status: SHIPPED | OUTPATIENT
Start: 2022-12-06

## 2022-12-06 RX ORDER — BUPROPION HYDROCHLORIDE 300 MG/1
300 TABLET ORAL DAILY
Qty: 90 TABLET | Refills: 1 | Status: SHIPPED | OUTPATIENT
Start: 2022-12-06

## 2022-12-06 RX ORDER — AMLODIPINE BESYLATE 5 MG/1
5 TABLET ORAL DAILY
Qty: 90 TABLET | Refills: 1 | Status: SHIPPED | OUTPATIENT
Start: 2022-12-06

## 2022-12-06 RX ORDER — TRAZODONE HYDROCHLORIDE 50 MG/1
50 TABLET ORAL NIGHTLY
Qty: 90 TABLET | Refills: 1 | Status: SHIPPED | OUTPATIENT
Start: 2022-12-06

## 2022-12-06 NOTE — PROGRESS NOTES
Subjective        Chief Complaint   Patient presents with   • Annual Exam     CPE           Mariela Polanco is a 57 y.o. female who presents for    Patient Active Problem List   Diagnosis   • Essential hypertension   • Depression   • Gastroesophageal reflux disease   • Pre-diabetes   • Encounter for screening for malignant neoplasm of colon   • Diverticulosis       History of Present Illness     She saw Dr. Nowak for diverticulitis recently. She was started on abx and had a scan ordered. She has been checking her BP and it has been 120/80. She has been doing well emotionally. She denies chest pain or dyspnea. She saw Dr. Mesa this am and had some testing.   Allergies   Allergen Reactions   • Penicillins Other (See Comments)     Childhood allergy       Current Outpatient Medications on File Prior to Visit   Medication Sig Dispense Refill   • Methylcellulose, Laxative, (CITRUCEL PO) Take  by mouth.     • [DISCONTINUED] amLODIPine (NORVASC) 5 MG tablet Take 1 tablet by mouth Daily. 90 tablet 1   • [DISCONTINUED] buPROPion XL (WELLBUTRIN XL) 300 MG 24 hr tablet Take 1 tablet by mouth Daily. 90 tablet 1   • [DISCONTINUED] pantoprazole (PROTONIX) 40 MG EC tablet Take 1 tablet by mouth Daily. 90 tablet 3   • [DISCONTINUED] traZODone (DESYREL) 50 MG tablet TAKE 1 TABLET BY MOUTH ONCE DAILY AT NIGHT 90 tablet 0     No current facility-administered medications on file prior to visit.       Past Medical History:   Diagnosis Date   • Adrenal adenoma    • Anxiety    • History of diverticulitis    • Insomnia    • Iron deficiency anemia 2019   • Menopausal symptoms    • Pneumonia    • Rosacea    • Steatosis, liver    • Vitamin D deficiency        Past Surgical History:   Procedure Laterality Date   •  SECTION     • CHOLECYSTECTOMY     • COLONOSCOPY  10/28/2016    nl with Dr. Everett Botello   • ENDOSCOPY N/A 05/10/2021    Procedure: ESOPHAGOGASTRODUODENOSCOPY WITH BIOPSY;  Surgeon: Dakota Nowak MD;   [place] [facility] [place]  Service: Gastroenterology;  Laterality: N/A;  GASTRIC POLYPS, SMALL HIATAL HERNIA, ESOPHAGITIS   • UPPER GASTROINTESTINAL ENDOSCOPY         Family History   Problem Relation Age of Onset   • Other Mother         ESRD (end stage renal disease)   • Heart failure Mother         chronic congestive heart failure   • Diabetes Mother    • Hyperlipidemia Mother    • Hypertension Mother    • Pneumonia Mother    • Skin cancer Mother    • Diverticulitis Father    • Skin cancer Father    • Colon polyps Father    • Diabetes Sister    • Atrial fibrillation Sister    • Diabetes Brother    • Other Maternal Aunt         malignant neoplasm of breast    • Crohn's disease Other    • Breast cancer Neg Hx    • Colon cancer Neg Hx    • Irritable bowel syndrome Neg Hx    • Ulcerative colitis Neg Hx        Social History     Socioeconomic History   • Marital status:    Tobacco Use   • Smoking status: Never   • Smokeless tobacco: Never   Vaping Use   • Vaping Use: Never used   Substance and Sexual Activity   • Alcohol use: Yes     Comment: social   • Drug use: Never   • Sexual activity: Defer           The following portions of the patient's history were reviewed and updated as appropriate: problem list, allergies, current medications, past medical history, past family history, past social history and past surgical history.    Review of Systems    Immunization History   Administered Date(s) Administered   • COVID-19 (MODERNA) 1st, 2nd, 3rd Dose Only 03/11/2021, 04/08/2021, 11/15/2021, 05/10/2022   • COVID-19 (MODERNA) BIVALENT BOOSTER 6+YRS 10/31/2022   • Flu Vaccine Intradermal Quad 18-64YR 11/27/2017, 11/12/2018, 09/15/2020   • Flu Vaccine Quad PF >36MO 12/27/2019, 12/07/2021   • FluLaval/Fluzone >6mos 12/27/2019   • Hepatitis A 12/19/2017, 11/12/2018   • Hepatitis B 03/01/2018, 05/11/2018, 12/10/2018   • Influenza, Unspecified 10/31/2022   • Shingrix 12/07/2021   • Tdap 11/27/2017   • Typhoid, Unspecified  "12/19/2017       Objective   Vitals:    12/06/22 1311   BP: 120/80   Pulse: 67   Temp: 97.3 °F (36.3 °C)   SpO2: 97%   Weight: 80.7 kg (178 lb)   Height: 167.6 cm (65.98\")     Body mass index is 28.74 kg/m².  Physical Exam  Vitals reviewed.   Constitutional:       Appearance: She is well-developed.   HENT:      Head: Normocephalic and atraumatic.      Mouth/Throat:      Mouth: Mucous membranes are moist.      Pharynx: Oropharynx is clear.   Eyes:      Extraocular Movements: Extraocular movements intact.      Conjunctiva/sclera: Conjunctivae normal.      Pupils: Pupils are equal, round, and reactive to light.   Neck:      Thyroid: No thyromegaly.      Vascular: No carotid bruit.   Cardiovascular:      Rate and Rhythm: Normal rate and regular rhythm.      Heart sounds: Normal heart sounds. No murmur heard.  Pulmonary:      Effort: Pulmonary effort is normal.      Breath sounds: Normal breath sounds.   Abdominal:      General: There is no distension.      Palpations: Abdomen is soft. There is no mass.      Tenderness: There is no abdominal tenderness. There is no rebound.   Musculoskeletal:      Cervical back: Neck supple.   Lymphadenopathy:      Cervical: No cervical adenopathy.   Skin:     General: Skin is warm.   Neurological:      Mental Status: She is alert.   Psychiatric:         Behavior: Behavior normal.         Procedures    Assessment & Plan   Diagnoses and all orders for this visit:    1. Wellness examination (Primary)    2. Essential hypertension  -     Comprehensive Metabolic Panel; Future  -     Lipid Panel With / Chol / HDL Ratio; Future  -     amLODIPine (NORVASC) 5 MG tablet; Take 1 tablet by mouth Daily.  Dispense: 90 tablet; Refill: 1    3. Pre-diabetes  -     Hemoglobin A1c; Future    4. Current mild episode of major depressive disorder without prior episode (HCC)  -     buPROPion XL (WELLBUTRIN XL) 300 MG 24 hr tablet; Take 1 tablet by mouth Daily.  Dispense: 90 tablet; Refill: 1    5. Encounter " for screening mammogram for malignant neoplasm of breast  -     Mammo Screening Bilateral With CAD; Future    6. Gastroesophageal reflux disease, unspecified whether esophagitis present  -     pantoprazole (PROTONIX) 40 MG EC tablet; Take 1 tablet by mouth Daily.  Dispense: 90 tablet; Refill: 3    7. Insomnia, unspecified type  -     traZODone (DESYREL) 50 MG tablet; Take 1 tablet by mouth Every Night.  Dispense: 90 tablet; Refill: 1             She has a cscope set up for Feb. She will go to the drug store to get her second Shingrix. Recc exercising 150 minutes per week. BP is good. Stable emotionally.    Return in about 6 months (around 6/6/2023) for Lab Before FUP.

## 2022-12-13 ENCOUNTER — TRANSCRIBE ORDERS (OUTPATIENT)
Dept: ADMINISTRATIVE | Facility: HOSPITAL | Age: 57
End: 2022-12-13

## 2022-12-13 DIAGNOSIS — Z12.31 VISIT FOR SCREENING MAMMOGRAM: Primary | ICD-10-CM

## 2023-02-06 ENCOUNTER — ANESTHESIA EVENT (OUTPATIENT)
Dept: SURGERY | Facility: SURGERY CENTER | Age: 58
End: 2023-02-06
Payer: COMMERCIAL

## 2023-02-06 ENCOUNTER — HOSPITAL ENCOUNTER (OUTPATIENT)
Facility: SURGERY CENTER | Age: 58
Setting detail: HOSPITAL OUTPATIENT SURGERY
Discharge: HOME | End: 2023-02-06
Attending: INTERNAL MEDICINE | Admitting: INTERNAL MEDICINE
Payer: COMMERCIAL

## 2023-02-06 ENCOUNTER — ANESTHESIA (OUTPATIENT)
Dept: SURGERY | Facility: SURGERY CENTER | Age: 58
End: 2023-02-06
Payer: COMMERCIAL

## 2023-02-06 VITALS
RESPIRATION RATE: 16 BRPM | WEIGHT: 169.4 LBS | HEIGHT: 66 IN | OXYGEN SATURATION: 96 % | DIASTOLIC BLOOD PRESSURE: 78 MMHG | SYSTOLIC BLOOD PRESSURE: 115 MMHG | HEART RATE: 64 BPM | TEMPERATURE: 98.4 F | BODY MASS INDEX: 27.23 KG/M2

## 2023-02-06 DIAGNOSIS — K57.90 DIVERTICULOSIS: ICD-10-CM

## 2023-02-06 DIAGNOSIS — Z83.71 FH: COLON POLYPS: ICD-10-CM

## 2023-02-06 DIAGNOSIS — Z12.11 ENCOUNTER FOR SCREENING FOR MALIGNANT NEOPLASM OF COLON: ICD-10-CM

## 2023-02-06 DIAGNOSIS — R19.4 CHANGE IN BOWEL HABITS: ICD-10-CM

## 2023-02-06 PROCEDURE — 45378 DIAGNOSTIC COLONOSCOPY: CPT | Performed by: INTERNAL MEDICINE

## 2023-02-06 PROCEDURE — 25010000002 PROPOFOL 10 MG/ML EMULSION: Performed by: ANESTHESIOLOGY

## 2023-02-06 PROCEDURE — 25010000002 PHENYLEPHRINE 10 MG/ML SOLUTION: Performed by: ANESTHESIOLOGY

## 2023-02-06 RX ORDER — SODIUM CHLORIDE, SODIUM LACTATE, POTASSIUM CHLORIDE, CALCIUM CHLORIDE 600; 310; 30; 20 MG/100ML; MG/100ML; MG/100ML; MG/100ML
INJECTION, SOLUTION INTRAVENOUS CONTINUOUS PRN
Status: DISCONTINUED | OUTPATIENT
Start: 2023-02-06 | End: 2023-02-06 | Stop reason: SURG

## 2023-02-06 RX ORDER — LIDOCAINE HYDROCHLORIDE 20 MG/ML
INJECTION, SOLUTION INFILTRATION; PERINEURAL AS NEEDED
Status: DISCONTINUED | OUTPATIENT
Start: 2023-02-06 | End: 2023-02-06 | Stop reason: SURG

## 2023-02-06 RX ORDER — SODIUM CHLORIDE, SODIUM LACTATE, POTASSIUM CHLORIDE, CALCIUM CHLORIDE 600; 310; 30; 20 MG/100ML; MG/100ML; MG/100ML; MG/100ML
30 INJECTION, SOLUTION INTRAVENOUS CONTINUOUS PRN
Status: DISCONTINUED | OUTPATIENT
Start: 2023-02-06 | End: 2023-02-06 | Stop reason: HOSPADM

## 2023-02-06 RX ORDER — PROPOFOL 10 MG/ML
VIAL (ML) INTRAVENOUS AS NEEDED
Status: DISCONTINUED | OUTPATIENT
Start: 2023-02-06 | End: 2023-02-06 | Stop reason: SURG

## 2023-02-06 RX ORDER — SODIUM CHLORIDE 0.9 % (FLUSH) 0.9 %
3 SYRINGE (ML) INJECTION EVERY 12 HOURS SCHEDULED
Status: DISCONTINUED | OUTPATIENT
Start: 2023-02-06 | End: 2023-02-06 | Stop reason: HOSPADM

## 2023-02-06 RX ORDER — MAGNESIUM HYDROXIDE 1200 MG/15ML
LIQUID ORAL AS NEEDED
Status: DISCONTINUED | OUTPATIENT
Start: 2023-02-06 | End: 2023-02-06 | Stop reason: HOSPADM

## 2023-02-06 RX ORDER — SODIUM CHLORIDE 0.9 % (FLUSH) 0.9 %
10 SYRINGE (ML) INJECTION AS NEEDED
Status: DISCONTINUED | OUTPATIENT
Start: 2023-02-06 | End: 2023-02-06 | Stop reason: HOSPADM

## 2023-02-06 RX ORDER — PHENYLEPHRINE HYDROCHLORIDE 10 MG/ML
INJECTION INTRAVENOUS AS NEEDED
Status: DISCONTINUED | OUTPATIENT
Start: 2023-02-06 | End: 2023-02-06 | Stop reason: SURG

## 2023-02-06 RX ADMIN — PROPOFOL 200 MCG/KG/MIN: 10 INJECTION, EMULSION INTRAVENOUS at 10:55

## 2023-02-06 RX ADMIN — LIDOCAINE HYDROCHLORIDE 50 MG: 20 INJECTION, SOLUTION INFILTRATION; PERINEURAL at 10:54

## 2023-02-06 RX ADMIN — PROPOFOL 120 MG: 10 INJECTION, EMULSION INTRAVENOUS at 10:55

## 2023-02-06 RX ADMIN — SODIUM CHLORIDE, SODIUM LACTATE, POTASSIUM CHLORIDE, AND CALCIUM CHLORIDE: .6; .31; .03; .02 INJECTION, SOLUTION INTRAVENOUS at 10:55

## 2023-02-06 RX ADMIN — PHENYLEPHRINE HYDROCHLORIDE 100 MCG: 10 INJECTION INTRAVENOUS at 11:13

## 2023-02-06 RX ADMIN — SODIUM CHLORIDE, POTASSIUM CHLORIDE, SODIUM LACTATE AND CALCIUM CHLORIDE 30 ML/HR: 600; 310; 30; 20 INJECTION, SOLUTION INTRAVENOUS at 10:26

## 2023-02-06 NOTE — ANESTHESIA POSTPROCEDURE EVALUATION
"Patient: Mariela Polanco    Procedure Summary     Date: 02/06/23 Room / Location[facility]    Anesthesia Start: 1052 Anesthesia Stop: 1118    Procedure: COLONOSCOPY FOR SCREENING Diagnosis:       FH: colon polyps      Encounter for screening for malignant neoplasm of colon      Diverticulosis      Change in bowel habits      (FH: colon polyps [Z83.71])      (Encounter for screening for malignant neoplasm of colon [Z12.11])      (Diverticulosis [K57.90])      (Change in bowel habits [R19.4])    Surgeons: Dakota Nowak MD Provider: Wendy Briscoe MD    Anesthesia Type: MAC ASA Status: 2          Anesthesia Type: MAC    Vitals  Vitals Value Taken Time   BP 97/65 02/06/23 1116   Temp 36.9 °C (98.4 °F) 02/06/23 1116   Pulse 56 02/06/23 1116   Resp 16 02/06/23 1116   SpO2 95 % 02/06/23 1116           Post Anesthesia Care and Evaluation    Patient location during evaluation: bedside  Patient participation: complete - patient participated  Level of consciousness: awake  Pain management: adequate    Airway patency: patent  Anesthetic complications: No anesthetic complications    Cardiovascular status: acceptable  Respiratory status: acceptable  Hydration status: acceptable    Comments: BP 97/65 (BP Location: Left arm, Patient Position: Lying)   Pulse 56   Temp 36.9 °C (98.4 °F) (Tympanic)   Resp 16   Ht 167.6 cm (66\")   Wt 76.8 kg (169 lb 6.4 oz)   SpO2 95%   BMI 27.34 kg/m²       "

## 2023-02-06 NOTE — ANESTHESIA PREPROCEDURE EVALUATION
Anesthesia Evaluation     NPO Solid Status: > 8 hours  NPO Liquid Status: > 2 hours           Airway   Mallampati: II  TM distance: >3 FB  Neck ROM: full  Dental - normal exam     Pulmonary    (-) COPD, sleep apnea, not a smoker  Cardiovascular     Rhythm: regular    (+) hypertension well controlled less than 2 medications,       Neuro/Psych  (+) psychiatric history Anxiety and Depression,    (-) seizures, CVA  GI/Hepatic/Renal/Endo    (+)  GERD,  liver disease fatty liver disease,     Musculoskeletal     Abdominal    Substance History      OB/GYN          Other                      Anesthesia Plan    ASA 2     MAC     intravenous induction     Anesthetic plan, risks, benefits, and alternatives have been provided, discussed and informed consent has been obtained with: patient.        CODE STATUS:

## 2023-02-06 NOTE — H&P
No chief complaint on file.      HPI  Patient today for screening colonoscopy.  She has a family history of polyps in her father and is also herself had issues of diverticulitis.         Problem List:    Patient Active Problem List   Diagnosis   • Essential hypertension   • Depression   • Gastroesophageal reflux disease   • Pre-diabetes   • Encounter for screening for malignant neoplasm of colon   • Diverticulosis       Medical History:    Past Medical History:   Diagnosis Date   • Adrenal adenoma    • Anxiety    • History of diverticulitis    • Insomnia    • Iron deficiency anemia 2019   • Menopausal symptoms    • Pneumonia    • Rosacea    • Steatosis, liver    • Vitamin D deficiency         Social History:    Social History     Socioeconomic History   • Marital status:    Tobacco Use   • Smoking status: Never   • Smokeless tobacco: Never   Vaping Use   • Vaping Use: Never used   Substance and Sexual Activity   • Alcohol use: Yes     Comment: social   • Drug use: Never   • Sexual activity: Defer       Family History:   Family History   Problem Relation Age of Onset   • Other Mother         ESRD (end stage renal disease)   • Heart failure Mother         chronic congestive heart failure   • Diabetes Mother    • Hyperlipidemia Mother    • Hypertension Mother    • Pneumonia Mother    • Skin cancer Mother    • Diverticulitis Father    • Skin cancer Father    • Colon polyps Father    • Diabetes Sister    • Atrial fibrillation Sister    • Diabetes Brother    • Other Maternal Aunt         malignant neoplasm of breast    • Crohn's disease Other    • Breast cancer Neg Hx    • Colon cancer Neg Hx    • Irritable bowel syndrome Neg Hx    • Ulcerative colitis Neg Hx        Surgical History:   Past Surgical History:   Procedure Laterality Date   •  SECTION     • CHOLECYSTECTOMY     • COLONOSCOPY  10/28/2016    nl with Dr. Everett Botello   • ENDOSCOPY N/A 05/10/2021    Procedure: ESOPHAGOGASTRODUODENOSCOPY  WITH BIOPSY;  Surgeon: Dakota Nowak MD;  [place] [facility] [place]  Service: Gastroenterology;  Laterality: N/A;  GASTRIC POLYPS, SMALL HIATAL HERNIA, ESOPHAGITIS   • UPPER GASTROINTESTINAL ENDOSCOPY           Current Facility-Administered Medications:   •  lactated ringers infusion, 30 mL/hr, Intravenous, Continuous PRN, Kalyani Alvarez, APRN  •  sodium chloride 0.9 % flush 10 mL, 10 mL, Intravenous, PRN, Kalyani Alvarez, APRN  •  sodium chloride 0.9 % flush 3 mL, 3 mL, Intravenous, Q12H, Kalyani Alvarez, APRN    Allergies:   Allergies   Allergen Reactions   • Penicillins Other (See Comments)     Childhood allergy        The following portions of the patient's history were reviewed by me and updated as appropriate: review of systems, allergies, current medications, past family history, past medical history, past social history, past surgical history and problem list.    Vitals:    02/06/23 1017   BP: 128/79   Pulse: 57   Resp: 16   Temp: 97 °F (36.1 °C)   SpO2: 98%       PHYSICAL EXAM:    CONSTITUTIONAL:  today's vital signs reviewed by me  GASTROINTESTINAL: abdomen is soft nontender nondistended with normal active bowel sounds, no masses are appreciated    Assessment/ Plan  We will proceed today with colonoscopy.    Risks and benefits as well as alternatives to endoscopic evaluation were explained to the patient and they voiced understanding and wish to proceed.  These risks include but are not limited to the risk of bleeding, perforation, adverse reaction to sedation, and missed lesions.  The patient was given the opportunity to ask questions prior to the endoscopic procedure.

## 2023-02-14 ENCOUNTER — HOSPITAL ENCOUNTER (OUTPATIENT)
Dept: MAMMOGRAPHY | Facility: HOSPITAL | Age: 58
Discharge: HOME | End: 2023-02-14
Admitting: INTERNAL MEDICINE
Payer: COMMERCIAL

## 2023-02-14 DIAGNOSIS — Z12.31 VISIT FOR SCREENING MAMMOGRAM: ICD-10-CM

## 2023-02-14 PROCEDURE — 77063 BREAST TOMOSYNTHESIS BI: CPT

## 2023-02-14 PROCEDURE — 77067 SCR MAMMO BI INCL CAD: CPT

## 2023-02-20 DIAGNOSIS — R92.8 ABNORMAL MAMMOGRAM OF RIGHT BREAST: Primary | ICD-10-CM

## 2023-03-20 ENCOUNTER — HOSPITAL ENCOUNTER (OUTPATIENT)
Dept: ULTRASOUND IMAGING | Facility: HOSPITAL | Age: 58
End: 2023-03-20
Payer: COMMERCIAL

## 2023-03-20 ENCOUNTER — HOSPITAL ENCOUNTER (OUTPATIENT)
Dept: MAMMOGRAPHY | Facility: HOSPITAL | Age: 58
Discharge: HOME | End: 2023-03-20
Admitting: PHYSICIAN ASSISTANT
Payer: COMMERCIAL

## 2023-03-20 DIAGNOSIS — R92.8 ABNORMAL MAMMOGRAM OF RIGHT BREAST: ICD-10-CM

## 2023-03-20 PROCEDURE — 77065 DX MAMMO INCL CAD UNI: CPT

## 2023-03-20 PROCEDURE — G0279 TOMOSYNTHESIS, MAMMO: HCPCS

## 2023-03-21 DIAGNOSIS — R92.1 BREAST CALCIFICATION, RIGHT: Primary | ICD-10-CM

## 2023-03-31 ENCOUNTER — HOSPITAL ENCOUNTER (OUTPATIENT)
Dept: MAMMOGRAPHY | Facility: HOSPITAL | Age: 58
Discharge: HOME | End: 2023-03-31
Admitting: RADIOLOGY
Payer: COMMERCIAL

## 2023-03-31 DIAGNOSIS — R92.1 BREAST CALCIFICATION, RIGHT: ICD-10-CM

## 2023-03-31 PROCEDURE — 88341 IMHCHEM/IMCYTCHM EA ADD ANTB: CPT | Performed by: INTERNAL MEDICINE

## 2023-03-31 PROCEDURE — A4648 IMPLANTABLE TISSUE MARKER: HCPCS

## 2023-03-31 PROCEDURE — 88360 TUMOR IMMUNOHISTOCHEM/MANUAL: CPT | Performed by: INTERNAL MEDICINE

## 2023-03-31 PROCEDURE — 88342 IMHCHEM/IMCYTCHM 1ST ANTB: CPT | Performed by: INTERNAL MEDICINE

## 2023-03-31 PROCEDURE — 0 LIDOCAINE 1 % SOLUTION: Performed by: INTERNAL MEDICINE

## 2023-03-31 PROCEDURE — 88305 TISSUE EXAM BY PATHOLOGIST: CPT | Performed by: INTERNAL MEDICINE

## 2023-03-31 RX ORDER — LIDOCAINE HYDROCHLORIDE AND EPINEPHRINE 20; 5 MG/ML; UG/ML
20 INJECTION, SOLUTION EPIDURAL; INFILTRATION; INTRACAUDAL; PERINEURAL ONCE
Status: COMPLETED | OUTPATIENT
Start: 2023-03-31 | End: 2023-03-31

## 2023-03-31 RX ORDER — LIDOCAINE HYDROCHLORIDE 10 MG/ML
10 INJECTION, SOLUTION INFILTRATION; PERINEURAL ONCE
Status: COMPLETED | OUTPATIENT
Start: 2023-03-31 | End: 2023-03-31

## 2023-03-31 RX ADMIN — LIDOCAINE HYDROCHLORIDE 10 ML: 10 INJECTION, SOLUTION INFILTRATION; PERINEURAL at 13:02

## 2023-03-31 RX ADMIN — LIDOCAINE HYDROCHLORIDE,EPINEPHRINE BITARTRATE 20 ML: 20; .005 INJECTION, SOLUTION EPIDURAL; INFILTRATION; INTRACAUDAL; PERINEURAL at 13:07

## 2023-04-03 DIAGNOSIS — D05.11 DUCTAL CARCINOMA IN SITU (DCIS) OF RIGHT BREAST: Primary | ICD-10-CM

## 2023-04-03 LAB
LAB AP CASE REPORT: NORMAL
LAB AP INTRADEPARTMENTAL CONSULT: NORMAL
LAB AP SPECIAL STAINS: NORMAL
LAB AP SYNOPTIC CHECKLIST: NORMAL
PATH REPORT.FINAL DX SPEC: NORMAL
PATH REPORT.GROSS SPEC: NORMAL

## 2023-04-04 ENCOUNTER — TELEPHONE (OUTPATIENT)
Dept: SURGERY | Facility: CLINIC | Age: 58
End: 2023-04-04
Payer: COMMERCIAL

## 2023-04-04 DIAGNOSIS — C50.919 MALIGNANT NEOPLASM OF FEMALE BREAST, UNSPECIFIED ESTROGEN RECEPTOR STATUS, UNSPECIFIED LATERALITY, UNSPECIFIED SITE OF BREAST: Primary | ICD-10-CM

## 2023-04-04 DIAGNOSIS — D05.11 DUCTAL CARCINOMA IN SITU (DCIS) OF RIGHT BREAST: Primary | ICD-10-CM

## 2023-04-04 NOTE — TELEPHONE ENCOUNTER
LM FOR PT TO CALL ME BACK REGARDING THE MRI BREAST SCHEDULED ON 4/14 @ 12:45 PM, ARRIVAL 12:15 PM FOR [facility], [place]. PT IS TO WEAR NO METALS TO THE APPT.

## 2023-04-04 NOTE — TELEPHONE ENCOUNTER
SPOKE TO PT & WENT OVER APPT DETAILS FOR THE MRI BREAST SCHEDULED ON 4/14 @ [facility].

## 2023-04-06 ENCOUNTER — TELEPHONE (OUTPATIENT)
Dept: INTERNAL MEDICINE | Facility: CLINIC | Age: 58
End: 2023-04-06

## 2023-04-06 NOTE — TELEPHONE ENCOUNTER
Caller: Mariela Polanco    Relationship to patient: Self    Best call back number: [phone]    Patient is needing:PATIENT PREFERS A DIFFERENT SURGEON. SHE WOULD LIKE LAST LABS AND OFFICE NOTE SENT TO NEW PROVIDER.    [facility]  DR. PRIYA DAWSON  PHONE [phone]  FAX (SHE DID NOT HAVE FAX)    PLEASE CALL AND ADVISE.

## 2023-04-10 DIAGNOSIS — D05.11 DUCTAL CARCINOMA IN SITU (DCIS) OF RIGHT BREAST: Primary | ICD-10-CM

## 2023-04-10 NOTE — PROGRESS NOTES
General Surgery Breast Cancer History and Physical Exam     Summary:    Mariela Polanco is a 57 y.o. lady who presents with a new diagnosis of right breast DCIS: Grade III,  ER+/OR+, Ki67 35%; [id number], Stage 0.      A multidisciplinary plan has been formulated for the patient:    (1) Breast Surgical Oncology:  -Follow up [employer] 9 panel genetic testing. I will call her with results.   -MRI to evaluate anatomy as well as for surgical planning. Scheduled for 4/14/2023.  -Nurse navigator consult.   -Surgical plan: She is considering her surgical options but believes she would like to proceed with right breast skin sparing versus nipple sparing mastectomy with sentinel lymph node biopsy. She is considering contralateral mastectomy.   -Plastic surgery referral placed now.     (2) Medical Oncology:  -Will refer postoperatively for evaluation for endocrine therapy.    (3) Radiation Oncology:  -Will refer postoperatively for evaluation for radiation therapy as clinically indicated.    Referring Provider: Kuldeep Robles MD    Chief Complaint: abnormal breast imaging    History of Present Illness: Ms. Mariela Polanco is a 57 y.o. year old lady, seen at the request of Kuldeep Robles MD for a new diagnosis of right breast cancer.      This was initially detected as an imaging abnormality. She has had annual mammograms each year. She denies any prior history of abnormal mammograms or breast biopsies. Her work-up is detailed in the oncologic history below.     She denies any breast lumps, pain, skin changes, or nipple discharge. She denies any family history of breast cancer. She believes her maternal aunt had ovarian cancer.      Workup of Current Diagnosis:    2/14/2023 Bilateral Screening Mammogram:  IMPRESSION:  Cluster of calcifications in the right breast. Magnification views recommended. The left breast remains negative.   BI-RADS 0: Needs additional evaluation.    3/20/2023 Right Breast Diagnostic Mammogram:    FINDINGS:  There are scattered areas of fibroglandular density. Today's study includes spot magnification views in the CC and MLO projections. There is a cluster of pleomorphic calcifications in the central right breast. The cluster is  up to 9 mm maximum dimension. Otherwise breast is negative  IMPRESSION:  Suspicious cluster of calcifications central right breast.  BI-RADS CATEGORY 4: Suspicious abnormality. Stereotactic biopsy recommended    3/31/2023 Right Breast Stereotactic Biopsy:   TECHNIQUE: Timeout was performed. Informed consent was obtained. Patient placed in stereotactic device and a superior approach was chosen. After  images, sterile preparation local anesthesia was performed. A skin nick was made with a scalpel. Then the 9 gauge vacuum-assisted biopsy needle was introduced into the breast to the predetermined coordinates. Pre and posterior images were obtained. Around the clock sampling was performed. Specimen image showed numerous calcifications had been  removed. The needle was removed and a marker clip was placed.    Postprocedure mammogram showed the clip in good position adjacent to the remaining calcifications. Posterior specimen image showed numerous calcifications in well 3 6 and 12  IMPRESSION:  Successful stereotactic biopsy right breast with removal of many of the calcifications. Pathology report is pending    3/31/2023 Pathology:   Final Diagnosis   1. Breast, Right, Core Biopsy:                A. Ductal carcinoma in-situ (DCIS), high nuclear grade with solid and cribriform architecture, central comedo type                   necrosis and microcalcifications measuring up to 5 mm maximally and involving three core fragments.     4/14/2023 Bilateral Breast MRI   pending    Past Medical History:   • HTN   • GERD    Past Surgical History:    • C section   • Cholecystectomy   • EGD and colonoscopy    Family History:    • As above    Social History:  • Denies tobacco use  • Occasional  alcohol use    Allergies:   Allergies   Allergen Reactions   • Penicillins Other (See Comments)     Childhood allergy     Medications:     Current Outpatient Medications:   •  amLODIPine (NORVASC) 5 MG tablet, Take 1 tablet by mouth Daily., Disp: 90 tablet, Rfl: 1  •  buPROPion XL (WELLBUTRIN XL) 300 MG 24 hr tablet, Take 1 tablet by mouth Daily., Disp: 90 tablet, Rfl: 1  •  Methylcellulose, Laxative, (CITRUCEL PO), Take  by mouth., Disp: , Rfl:   •  pantoprazole (PROTONIX) 40 MG EC tablet, Take 1 tablet by mouth Daily., Disp: 90 tablet, Rfl: 3  •  traZODone (DESYREL) 50 MG tablet, Take 1 tablet by mouth Every Night., Disp: 90 tablet, Rfl: 1    Laboratory Values:    Labs from 2022 reviewed    Review of Systems:   Influenza-like illness: no fever, no  cough, no  sore throat, no  body aches, no loss of sense of taste or smell, no known exposure to person with Covid-19.  Constitutional: Negative for fevers or chills  HENT: Negative for hearing loss or runny nose  Eyes: Negative for vision changes or scleral icterus  Respiratory: Negative for cough or shortness of breath  Cardiovascular: Negative for chest pain or heart palpitations  Gastrointestinal: Negative for abdominal pain, nausea, vomiting, constipation, melena, or hematochezia  Genitourinary: Negative for hematuria or dysuria  Musculoskeletal: Negative for joint swelling or gait instability  Neurologic: Negative for tremors or seizures  Psychiatric: Negative for suicidal ideations or depression  All other systems reviewed and negative    Physical Exam:   • ECO - Asymptomatic  • Constitutional: Well-developed well-nourished, no acute distress  • Eyes: Conjunctiva normal, sclera nonicteric  • ENMT: Hearing grossly normal, oral mucosa moist  • Neck: Supple, no palpable mass, trachea midline  • Respiratory: Clear to auscultation, normal inspiratory effort  • Cardiovascular: Regular rate, no peripheral edema, no jugular venous distention  • Breast:  symmetric  o Right: No visible abnormalities on inspection while seated, with arms raised or hands on hips. No masses, skin changes, or nipple abnormalities.  o Left: No visible abnormalities on inspection while seated, with arms raised or hands on hips. No masses, skin changes, or nipple abnormalities.  o Biopsy site appreciated in right breast, otherwise no skin changes.   o No clinical chest wall involvement.  • Gastrointestinal: Soft, nontender  • Lymphatics (palpable nodes): No cervical, supraclavicular or axillary lymphadenopathy  • Skin:  Warm, dry, no rash on visualized skin surfaces  • Musculoskeletal: Symmetric strength, normal gait  • Psychiatric: Alert and oriented ×3, normal affect     Discussion:  I had an extensive discussion with the patient and her family about the nature of her breast cancer diagnosis. We reviewed the components of breast tissue including ducts and lobules. We reviewed her pathology report in detail. We reviewed breast cancer histology, including stage, grade, ER/ME receptors, HER2 receptors and how this applies to her diagnosis. We reviewed the basics of systemic and local/regional management of breast cancer.     We discussed that most breast cancer is not hereditary, however given her family history of ovarian cancer and her personal history, this may play a role in her case. I believe genetic testing is warranted and could affect surgical decision making.     We reviewed potential surgical treatments to include partial mastectomy, mastectomy, sentinel lymph node biopsy and axillary node dissection and discussed the rationale associated with each approach. Regarding radiation therapy, we discussed that radiation is indicated in all cases of breast conservation and in only limited circumstances following mastectomy. We discussed that the primary goal of adjuvant radiation is to decrease the likelihood of local recurrence.     We discussed axillary staging. I described the  procedure for sentinel lymph node biopsy in detail, including the preoperative injection of technetium sulfur colloid and intraoperative injection of lymphazurin blue dye. I explained that this is a mapping test and not a cancer test, that all of the lymph nodes containing these dyes will be removed for complete testing by pathology, and that the results could impact the decision for adjuvant treatment or additional surgery.    I described additional risks and potential complications associated with surgery, including, but not limited to, bleeding, infection, complications related to blue dye, lymphedema, deformity/poor cosmetic result, chronic pain, neurovascular injury, numbness, seroma, hematoma, deep venous thrombosis, skin flap necrosis, disease recurrence and the possibility of requiring additional surgery. We also discussed other treatment options including the option of not undergoing any surgical treatment and the risks associated with this including disease progression. She expressed an understanding of these factors and wished to proceed.    We discussed that in her case, systemic treatment would likely involve endocrine therapy/targeted therapy.     HUSSAIN ROBINS M.D.  General and Endoscopic Surgery  [facility]    [address], [address]  [place], [place], [address]  P: [phone]  F: [phone]

## 2023-04-10 NOTE — H&P (VIEW-ONLY)
General Surgery Breast Cancer History and Physical Exam     Summary:    Marilea Polanco is a 57 y.o. lady who presents with a new diagnosis of right breast DCIS: Grade III,  ER+/TN+, Ki67 35%; dHgpO6S2, Stage 0.      A multidisciplinary plan has been formulated for the patient:    (1) Breast Surgical Oncology:  -Follow up [employer] 9 panel genetic testing. I will call her with results.   -MRI to evaluate anatomy as well as for surgical planning. Scheduled for 4/14/2023.  -Nurse navigator consult.   -Surgical plan: She is considering her surgical options but believes she would like to proceed with right breast skin sparing versus nipple sparing mastectomy with sentinel lymph node biopsy. She is considering contralateral mastectomy.   -Plastic surgery referral placed now.     (2) Medical Oncology:  -Will refer postoperatively for evaluation for endocrine therapy.    (3) Radiation Oncology:  -Will refer postoperatively for evaluation for radiation therapy as clinically indicated.    Referring Provider: Kuldeep Robles MD    Chief Complaint: abnormal breast imaging    History of Present Illness: Ms. Mariela Polanco is a 57 y.o. year old lady, seen at the request of Kuldeep Robles MD for a new diagnosis of right breast cancer.      This was initially detected as an imaging abnormality. She has had annual mammograms each year. She denies any prior history of abnormal mammograms or breast biopsies. Her work-up is detailed in the oncologic history below.     She denies any breast lumps, pain, skin changes, or nipple discharge. She denies any family history of breast cancer. She believes her maternal aunt had ovarian cancer.      Workup of Current Diagnosis:    2/14/2023 Bilateral Screening Mammogram:  IMPRESSION:  Cluster of calcifications in the right breast. Magnification views recommended. The left breast remains negative.   BI-RADS 0: Needs additional evaluation.    3/20/2023 Right Breast Diagnostic Mammogram:    FINDINGS:  There are scattered areas of fibroglandular density. Today's study includes spot magnification views in the CC and MLO projections. There is a cluster of pleomorphic calcifications in the central right breast. The cluster is  up to 9 mm maximum dimension. Otherwise breast is negative  IMPRESSION:  Suspicious cluster of calcifications central right breast.  BI-RADS CATEGORY 4: Suspicious abnormality. Stereotactic biopsy recommended    3/31/2023 Right Breast Stereotactic Biopsy:   TECHNIQUE: Timeout was performed. Informed consent was obtained. Patient placed in stereotactic device and a superior approach was chosen. After  images, sterile preparation local anesthesia was performed. A skin nick was made with a scalpel. Then the 9 gauge vacuum-assisted biopsy needle was introduced into the breast to the predetermined coordinates. Pre and posterior images were obtained. Around the clock sampling was performed. Specimen image showed numerous calcifications had been  removed. The needle was removed and a marker clip was placed.    Postprocedure mammogram showed the clip in good position adjacent to the remaining calcifications. Posterior specimen image showed numerous calcifications in well 3 6 and 12  IMPRESSION:  Successful stereotactic biopsy right breast with removal of many of the calcifications. Pathology report is pending    3/31/2023 Pathology:   Final Diagnosis   1. Breast, Right, Core Biopsy:                A. Ductal carcinoma in-situ (DCIS), high nuclear grade with solid and cribriform architecture, central comedo type                   necrosis and microcalcifications measuring up to 5 mm maximally and involving three core fragments.     4/14/2023 Bilateral Breast MRI   pending    Past Medical History:   • HTN   • GERD    Past Surgical History:    • C section   • Cholecystectomy   • EGD and colonoscopy    Family History:    • As above    Social History:  • Denies tobacco use  • Occasional  alcohol use    Allergies:   Allergies   Allergen Reactions   • Penicillins Other (See Comments)     Childhood allergy     Medications:     Current Outpatient Medications:   •  amLODIPine (NORVASC) 5 MG tablet, Take 1 tablet by mouth Daily., Disp: 90 tablet, Rfl: 1  •  buPROPion XL (WELLBUTRIN XL) 300 MG 24 hr tablet, Take 1 tablet by mouth Daily., Disp: 90 tablet, Rfl: 1  •  Methylcellulose, Laxative, (CITRUCEL PO), Take  by mouth., Disp: , Rfl:   •  pantoprazole (PROTONIX) 40 MG EC tablet, Take 1 tablet by mouth Daily., Disp: 90 tablet, Rfl: 3  •  traZODone (DESYREL) 50 MG tablet, Take 1 tablet by mouth Every Night., Disp: 90 tablet, Rfl: 1    Laboratory Values:    Labs from 2022 reviewed    Review of Systems:   Influenza-like illness: no fever, no  cough, no  sore throat, no  body aches, no loss of sense of taste or smell, no known exposure to person with Covid-19.  Constitutional: Negative for fevers or chills  HENT: Negative for hearing loss or runny nose  Eyes: Negative for vision changes or scleral icterus  Respiratory: Negative for cough or shortness of breath  Cardiovascular: Negative for chest pain or heart palpitations  Gastrointestinal: Negative for abdominal pain, nausea, vomiting, constipation, melena, or hematochezia  Genitourinary: Negative for hematuria or dysuria  Musculoskeletal: Negative for joint swelling or gait instability  Neurologic: Negative for tremors or seizures  Psychiatric: Negative for suicidal ideations or depression  All other systems reviewed and negative    Physical Exam:   • ECO - Asymptomatic  • Constitutional: Well-developed well-nourished, no acute distress  • Eyes: Conjunctiva normal, sclera nonicteric  • ENMT: Hearing grossly normal, oral mucosa moist  • Neck: Supple, no palpable mass, trachea midline  • Respiratory: Clear to auscultation, normal inspiratory effort  • Cardiovascular: Regular rate, no peripheral edema, no jugular venous distention  • Breast:  symmetric  o Right: No visible abnormalities on inspection while seated, with arms raised or hands on hips. No masses, skin changes, or nipple abnormalities.  o Left: No visible abnormalities on inspection while seated, with arms raised or hands on hips. No masses, skin changes, or nipple abnormalities.  o Biopsy site appreciated in right breast, otherwise no skin changes.   o No clinical chest wall involvement.  • Gastrointestinal: Soft, nontender  • Lymphatics (palpable nodes): No cervical, supraclavicular or axillary lymphadenopathy  • Skin:  Warm, dry, no rash on visualized skin surfaces  • Musculoskeletal: Symmetric strength, normal gait  • Psychiatric: Alert and oriented ×3, normal affect     Discussion:  I had an extensive discussion with the patient and her family about the nature of her breast cancer diagnosis. We reviewed the components of breast tissue including ducts and lobules. We reviewed her pathology report in detail. We reviewed breast cancer histology, including stage, grade, ER/WY receptors, HER2 receptors and how this applies to her diagnosis. We reviewed the basics of systemic and local/regional management of breast cancer.     We discussed that most breast cancer is not hereditary, however given her family history of ovarian cancer and her personal history, this may play a role in her case. I believe genetic testing is warranted and could affect surgical decision making.     We reviewed potential surgical treatments to include partial mastectomy, mastectomy, sentinel lymph node biopsy and axillary node dissection and discussed the rationale associated with each approach. Regarding radiation therapy, we discussed that radiation is indicated in all cases of breast conservation and in only limited circumstances following mastectomy. We discussed that the primary goal of adjuvant radiation is to decrease the likelihood of local recurrence.     We discussed axillary staging. I described the  procedure for sentinel lymph node biopsy in detail, including the preoperative injection of technetium sulfur colloid and intraoperative injection of lymphazurin blue dye. I explained that this is a mapping test and not a cancer test, that all of the lymph nodes containing these dyes will be removed for complete testing by pathology, and that the results could impact the decision for adjuvant treatment or additional surgery.    I described additional risks and potential complications associated with surgery, including, but not limited to, bleeding, infection, complications related to blue dye, lymphedema, deformity/poor cosmetic result, chronic pain, neurovascular injury, numbness, seroma, hematoma, deep venous thrombosis, skin flap necrosis, disease recurrence and the possibility of requiring additional surgery. We also discussed other treatment options including the option of not undergoing any surgical treatment and the risks associated with this including disease progression. She expressed an understanding of these factors and wished to proceed.    We discussed that in her case, systemic treatment would likely involve endocrine therapy/targeted therapy.     HUSSAIN ROBINS M.D.  General and Endoscopic Surgery  [facility]    [address], [address]  [place], [place], [address]  P: [phone]  F: [phone]

## 2023-04-11 ENCOUNTER — OFFICE VISIT (OUTPATIENT)
Dept: SURGERY | Facility: CLINIC | Age: 58
End: 2023-04-11
Payer: COMMERCIAL

## 2023-04-11 VITALS — BODY MASS INDEX: 26.33 KG/M2 | WEIGHT: 163.8 LBS | HEIGHT: 66 IN

## 2023-04-11 DIAGNOSIS — C50.919 MALIGNANT NEOPLASM OF FEMALE BREAST, UNSPECIFIED ESTROGEN RECEPTOR STATUS, UNSPECIFIED LATERALITY, UNSPECIFIED SITE OF BREAST: Primary | ICD-10-CM

## 2023-04-11 PROCEDURE — 99204 OFFICE O/P NEW MOD 45 MIN: CPT | Performed by: STUDENT IN AN ORGANIZED HEALTH CARE EDUCATION/TRAINING PROGRAM

## 2023-04-11 RX ORDER — SEMAGLUTIDE 0.5 MG/.5ML
INJECTION, SOLUTION SUBCUTANEOUS WEEKLY
COMMUNITY
Start: 2023-03-25

## 2023-04-11 RX ORDER — TESTOSTERONE GEL, 1% 10 MG/G
50 GEL TRANSDERMAL DAILY
COMMUNITY
End: 2023-04-19

## 2023-04-12 ENCOUNTER — PATIENT OUTREACH (OUTPATIENT)
Dept: OTHER | Facility: HOSPITAL | Age: 58
End: 2023-04-12
Payer: COMMERCIAL

## 2023-04-12 NOTE — PROGRESS NOTES
Referral received from Dr. Mccormack's office. I called Ms. Mikesamuelneal and introduced myself and navigation services. She was on another line and could not talk. Will call tomorrow after 11 am.

## 2023-04-13 ENCOUNTER — PATIENT OUTREACH (OUTPATIENT)
Dept: OTHER | Facility: HOSPITAL | Age: 58
End: 2023-04-13
Payer: COMMERCIAL

## 2023-04-13 NOTE — PROGRESS NOTES
Referral received from Dr. Mccormack''s office. I called Ms. Polanco and introduced myself and navigational services. She stated the consult with Dr. Mccormack went well and she is leaning toward a bilateral mastectomy with reconstruction. She has a good understanding of her pathology and treatment options. She was able to verbalize teach back on her plan of care.      She stated she has a wonderful support system with her , two children and . She stated she feels comfortable talking to them about needs or issues. She will look into [employer] for support or her family during this time.     She stated she has no financial or transportation concerns at this time, but will reach out if the need arises. She has no resource needs or ongoing concerns at this time.      She stated she has been anxious about her diagnosis at times and we discussed that can be normal. We discussed friend for life cancer support network and she was interested in getting connected. Tthat referral has been placed.  We also discussed other support options if the need arises. She was thankful for the information.      We discussed integrative therapies and other services at [facility]. She received a navigation folder with the following information at her appointment:     Friend for Life Cancer Support Network, Cancer and Restorative Exercise (CARE), [employer] Exercise program, Guide for the Newly Diagnosed, [employer], [facility], Massage Therapy, Reiki Therapy, [employer] [place], Cancer Nutrition, and Survivorship Clinic.     She verbalized appreciation for navigational services and she has my contact information and will call with any questions that arise.

## 2023-04-14 ENCOUNTER — HOSPITAL ENCOUNTER (OUTPATIENT)
Dept: MRI IMAGING | Facility: HOSPITAL | Age: 58
Discharge: HOME | End: 2023-04-14
Payer: COMMERCIAL

## 2023-04-14 DIAGNOSIS — D05.11 DUCTAL CARCINOMA IN SITU (DCIS) OF RIGHT BREAST: ICD-10-CM

## 2023-04-14 PROCEDURE — A9577 INJ MULTIHANCE: HCPCS

## 2023-04-14 PROCEDURE — 82565 ASSAY OF CREATININE: CPT

## 2023-04-14 PROCEDURE — 0 GADOBENATE DIMEGLUMINE 529 MG/ML SOLUTION

## 2023-04-14 PROCEDURE — 77049 MRI BREAST C-+ W/CAD BI: CPT

## 2023-04-14 RX ADMIN — GADOBENATE DIMEGLUMINE 15 ML: 529 INJECTION, SOLUTION INTRAVENOUS at 13:17

## 2023-04-15 LAB — CREAT BLDA-MCNC: 0.8 MG/DL (ref 0.6–1.3)

## 2023-04-17 ENCOUNTER — OFFICE VISIT (OUTPATIENT)
Dept: PLASTIC SURGERY | Facility: CLINIC | Age: 58
End: 2023-04-17
Payer: COMMERCIAL

## 2023-04-17 ENCOUNTER — LAB (OUTPATIENT)
Dept: LAB | Facility: HOSPITAL | Age: 58
End: 2023-04-17
Payer: COMMERCIAL

## 2023-04-17 ENCOUNTER — TELEPHONE (OUTPATIENT)
Dept: SURGERY | Facility: CLINIC | Age: 58
End: 2023-04-17
Payer: COMMERCIAL

## 2023-04-17 ENCOUNTER — PREP FOR SURGERY (OUTPATIENT)
Dept: OTHER | Facility: HOSPITAL | Age: 58
End: 2023-04-17
Payer: COMMERCIAL

## 2023-04-17 VITALS
TEMPERATURE: 97.8 F | HEART RATE: 77 BPM | DIASTOLIC BLOOD PRESSURE: 68 MMHG | HEIGHT: 66 IN | OXYGEN SATURATION: 96 % | WEIGHT: 162.7 LBS | SYSTOLIC BLOOD PRESSURE: 118 MMHG | BODY MASS INDEX: 26.15 KG/M2

## 2023-04-17 DIAGNOSIS — D05.11 DUCTAL CARCINOMA IN SITU (DCIS) OF RIGHT BREAST: ICD-10-CM

## 2023-04-17 DIAGNOSIS — N65.1 DISPROPORTION BETWEEN NATIVE BREAST AND RECONSTRUCTED BREAST: ICD-10-CM

## 2023-04-17 DIAGNOSIS — Z01.818 PRE-OPERATIVE EXAMINATION: Primary | ICD-10-CM

## 2023-04-17 DIAGNOSIS — D05.11 DUCTAL CARCINOMA IN SITU (DCIS) OF RIGHT BREAST: Primary | ICD-10-CM

## 2023-04-17 PROCEDURE — 80305 DRUG TEST PRSMV DIR OPT OBS: CPT | Performed by: SURGERY

## 2023-04-17 RX ORDER — LIDOCAINE AND PRILOCAINE 25; 25 MG/G; MG/G
1 CREAM TOPICAL ONCE
Status: CANCELLED | OUTPATIENT
Start: 2023-04-24 | End: 2023-04-17

## 2023-04-17 RX ORDER — DIAZEPAM 5 MG/1
10 TABLET ORAL ONCE
Status: CANCELLED | OUTPATIENT
Start: 2023-04-24 | End: 2023-04-17

## 2023-04-17 RX ORDER — CEFAZOLIN SODIUM 2 G/100ML
2 INJECTION, SOLUTION INTRAVENOUS ONCE
Status: CANCELLED | OUTPATIENT
Start: 2023-04-24 | End: 2023-04-17

## 2023-04-17 RX ORDER — CEPHALEXIN 500 MG/1
500 CAPSULE ORAL 4 TIMES DAILY
Qty: 20 CAPSULE | Refills: 0 | Status: CANCELLED | OUTPATIENT
Start: 2023-04-17 | End: 2023-04-22

## 2023-04-17 NOTE — H&P (VIEW-ONLY)
"Consult (New Patient Consult Breast Recon)            History of Present Illness  Mariela Polanco is a 57 y.o. female who presents to [facility] as a consult from Dr Mccormack to discuss breast reconstructive options.  She wears D bra size and would like to be around the same size D.     Subjective      Penicillins  Allergies Reconciled.    Review of Systems  All system were reviewed and were negative, except the ones noted above.     @Objective     /68 (BP Location: Left arm)   Pulse 77   Temp 97.8 °F (36.6 °C) (Temporal)   Ht 167.6 cm (66\")   Wt 73.8 kg (162 lb 11.2 oz)   SpO2 96%   BMI 26.26 kg/m²     Body mass index is 26.26 kg/m².    Physical Exam  Physical exam:  Patient awake, alert, oriented  Respirations are non elaborated  Patient is not tachycardic    Breast exam:     Bilateral breast with right  breast larger than left, grade 3 nipple ptosis, no palpable masses or tenderness   Axillary Lymphadenopathy: No axillary lymphadenopathy  SN-N (Right Breast): 28  SN-N (Left Breast):28  N-IMF (Right Breast):8  N-IMF (Left Breast):8  Base Width (Right Breast):20  Base Width (Left Breast):20      Result Review :              Assessment and Plan      Diagnoses and all orders for this visit:    1. Pre-operative examination (Primary)  -     Nicotine Screen, Urine - Urine, Clean Catch; Future  -     ondansetron (Zofran) 4 MG tablet; Take 1 tablet by mouth Daily As Needed for Nausea or Vomiting.  Dispense: 20 tablet; Refill: 0  -     oxyCODONE-acetaminophen (Percocet) 5-325 MG per tablet; Take 1-2 tablets by mouth Every 6 (Six) Hours As Needed for Moderate Pain.  Dispense: 28 tablet; Refill: 0  -     Nicotine Screen, Urine - Urine, Clean Catch    2. Ductal carcinoma in situ (DCIS) of right breast    3. Disproportion between native breast and reconstructed breast    Other orders  -     clindamycin (Cleocin) 150 MG capsule; Take 1 capsule by mouth 3 (Three) " Times a Day.  Dispense: 30 capsule; Refill: 0        Additional Order(s):       • Breast reconstruction options (Tissue Expander/Implant versus Autologous) were all discussed with the patient at length.  In addition, we discussed options for symmetry procedures and nipple reconstruction.  The patient understands that her reconstructed breast will not have the same sensation as a natural breast and that visible incision lines will always be present.  In addition, patient understand that breast reconstruction is a multi-stage procedure that can take months to years to complete.   • After thorough discussion of risk and benefits of each procedure the patient has elected to proceed.   • We will send information for prior authorization.  Photos were obtained.   • Patient was given the breast reconstruction pamphlet as well as directed to the ASPS website for additional information.   • The patient was made aware that the FDA has discovered rare occurrences between an uncommon form of lymphoma (anaplastic large cell) and women with breast implants.  While the incidence is quite low, the risk of development is real; therefore, any unusual symptoms or signs (most commonly a late fluid collection years later) should be brought to the attention of your physician.  Patient also counseled on risks and benefits of saline versus silicone implants, lifting restrictions, scar placement, risk of capsular contracture, animation deformity, need for likely revision of breast implants at some point in her life, FDA recommendation of MRI every three years to monitor for rupture, and continued mammography for breast cancer surveillance.   • We had a long discussion with the patient and her family today regarding her reconstructive options.  These include no reconstruction, reconstruction at the time of mastectomy or lumpectomy, and finally delayed reconstruction.  We discussed specific types of reconstruction, including: tissue expander  and/or implants, and autologous reconstruction with either pedicled or free flap.  We also covered the later stages of reconstruction, including nipple reconstruction and areola tattooing, fat grafting, and symmetry procedures if indicated or desired.   • I described the typical paul-operative course of each procedure, including the nature of the procedure, hospital stay, necessity for drains, post-operative activity restriction, and postoperative visits (including those for tissue expansion).  We also discussed the time frame for reconstruction.  I shared information about saline vs. silicone implants, including their differences, risk of capsular contracture, rippling, or leak/rupture, and safety/monitoring issues.  I described Alloderm and its use for implant reconstruction. We discussed that radiation therapy, if she ultimately requires it, may alter the reconstructive options.     • We reviewed surgical risks including, but not limited to, infection, bleeding, hematoma, seroma, pain, scarring, numbness, skin or nipple loss, wound healing problems, flap failures including partial or complete flap loss, asymmetry, need for revisions or further surgeries,  and risks associated with anesthesia.   • Additional risks with autologous reconstruction include donor wound morbidities, such as hernias or bulges, wound healing problems, muscle weakness, partial or complete flap loss, and typical surgical risks as listed above.   • The use of biological mesh is not for soft tissue reinforcement. The use of mesh is necessary because the mastectomy dissection pocket is larger than the implant itself. The intention of the mesh is to restrain the displacement of the implant to the axilla, which is a very common complication requiring revision. In my experience, the use of mesh avoids that specific complication and very often avoids a second surgery. The use of mesh also allows me to perform a safer procedure since it holds the  implant in place and alleviates the pressure over the skin that depends only on the subdermal blood supply. Without the mesh, I believe I wouldn't be able to perform direct implant reconstruction and give the optimal result that patient desires and deserves.   •    • Specifically on her case,  I recommend bilateral breast reconstruction with implant and mesh, use of spy, possible expanders, I don't recommend to keep the nipples due to cosmetic outcome   • Consent: bilateral breast reconstruction with implant and mesh, use of spy, possible expanders, .   • Target implant size: max 600  cc  •   •   • CPT codes:   • 56120-26 - immediate insertion of breast prosthesis following reconstruction   • 04246-81  implantation of biologic implant  • 74011- intravenous injection of agent (SPY)  •      Implants Allergan   · Allergan [id number], 520, 560, 580, 615 x 3  · Expander: [id number] x2  · Single use sizer [id number] x1    Mesh:  • Mesh:  alloderm rectangle 16x20 perforated [id number] x2 (implants larger than 600cc)  OR time: 3 hours     • Maryse Wilkinson MD, PhD  • NPI: [id number]      Women's Health and Cancer Rights Act (WHCRA)  The Women's Health and Cancer Rights Act of 1998 (WHCRA) is a federal law that provides protections to patients who choose to have breast reconstruction in connection with a mastectomy.  Coverage must be provided for:    1.All stages of reconstruction of the breast on which the mastectomy has been performed;  2.Surgery and reconstruction of the other breast to produce a symmetrical appearance; and  Prostheses and treatment of physical complications of all stages of the mastectomy, including lymphedema.    This law applies to two different types of coverage:    1.Group health plans (provided by an employer or union);  2.Individual health insurance policies (not based on employment).      • Given these options, the patient has verbally expressed an understanding of the risks of surgery and finds  these risks acceptable. We will proceed with surgery as soon as possible.    • Medications sent to pharmacy    Cotinine Ordered    Pictures obtained    Scribed by Gilda Paige, acting as a scribe for Maryse Wilkinson MD, 04/17/23 10:33 EDT.  Maryse Wilkinson MD's signature on the note affirms that the note adequately documents the care provided.        I spent 60 minutes caring for Mariela on this date of service. This time includes time spent by me in the following activities:performing a medically appropriate examination and/or evaluation , counseling and educating the patient/family/caregiver, documenting information in the medical record, and care coordination      Scribed by Gilda Paige, acting as a scribe for Maryse Wilkinson MD, 04/17/23 10:33 EDT.  Maryse Wilkinson MD's signature on the note affirms that the note adequately documents the care provided.      Follow Up     No follow-ups on file.    Patient was given instructions and counseling regarding her condition. Please see specific information pulled into the AVS if appropriate.     Maryse Wilkinson MD  04/17/2023

## 2023-04-17 NOTE — PROGRESS NOTES
"Consult (New Patient Consult Breast Recon)            History of Present Illness  Mariela Polanco is a 57 y.o. female who presents to [facility] as a consult from Dr Mccormack to discuss breast reconstructive options.  She wears D bra size and would like to be around the same size D.     Subjective      Penicillins  Allergies Reconciled.    Review of Systems  All system were reviewed and were negative, except the ones noted above.     @Objective     /68 (BP Location: Left arm)   Pulse 77   Temp 97.8 °F (36.6 °C) (Temporal)   Ht 167.6 cm (66\")   Wt 73.8 kg (162 lb 11.2 oz)   SpO2 96%   BMI 26.26 kg/m²     Body mass index is 26.26 kg/m².    Physical Exam  Physical exam:  Patient awake, alert, oriented  Respirations are non elaborated  Patient is not tachycardic    Breast exam:     Bilateral breast with right  breast larger than left, grade 3 nipple ptosis, no palpable masses or tenderness   Axillary Lymphadenopathy: No axillary lymphadenopathy  SN-N (Right Breast): 28  SN-N (Left Breast):28  N-IMF (Right Breast):8  N-IMF (Left Breast):8  Base Width (Right Breast):20  Base Width (Left Breast):20      Result Review :              Assessment and Plan      Diagnoses and all orders for this visit:    1. Pre-operative examination (Primary)  -     Nicotine Screen, Urine - Urine, Clean Catch; Future  -     ondansetron (Zofran) 4 MG tablet; Take 1 tablet by mouth Daily As Needed for Nausea or Vomiting.  Dispense: 20 tablet; Refill: 0  -     oxyCODONE-acetaminophen (Percocet) 5-325 MG per tablet; Take 1-2 tablets by mouth Every 6 (Six) Hours As Needed for Moderate Pain.  Dispense: 28 tablet; Refill: 0  -     Nicotine Screen, Urine - Urine, Clean Catch    2. Ductal carcinoma in situ (DCIS) of right breast    3. Disproportion between native breast and reconstructed breast    Other orders  -     clindamycin (Cleocin) 150 MG capsule; Take 1 capsule by mouth 3 (Three) " Times a Day.  Dispense: 30 capsule; Refill: 0        Additional Order(s):       • Breast reconstruction options (Tissue Expander/Implant versus Autologous) were all discussed with the patient at length.  In addition, we discussed options for symmetry procedures and nipple reconstruction.  The patient understands that her reconstructed breast will not have the same sensation as a natural breast and that visible incision lines will always be present.  In addition, patient understand that breast reconstruction is a multi-stage procedure that can take months to years to complete.   • After thorough discussion of risk and benefits of each procedure the patient has elected to proceed.   • We will send information for prior authorization.  Photos were obtained.   • Patient was given the breast reconstruction pamphlet as well as directed to the ASPS website for additional information.   • The patient was made aware that the FDA has discovered rare occurrences between an uncommon form of lymphoma (anaplastic large cell) and women with breast implants.  While the incidence is quite low, the risk of development is real; therefore, any unusual symptoms or signs (most commonly a late fluid collection years later) should be brought to the attention of your physician.  Patient also counseled on risks and benefits of saline versus silicone implants, lifting restrictions, scar placement, risk of capsular contracture, animation deformity, need for likely revision of breast implants at some point in her life, FDA recommendation of MRI every three years to monitor for rupture, and continued mammography for breast cancer surveillance.   • We had a long discussion with the patient and her family today regarding her reconstructive options.  These include no reconstruction, reconstruction at the time of mastectomy or lumpectomy, and finally delayed reconstruction.  We discussed specific types of reconstruction, including: tissue expander  and/or implants, and autologous reconstruction with either pedicled or free flap.  We also covered the later stages of reconstruction, including nipple reconstruction and areola tattooing, fat grafting, and symmetry procedures if indicated or desired.   • I described the typical paul-operative course of each procedure, including the nature of the procedure, hospital stay, necessity for drains, post-operative activity restriction, and postoperative visits (including those for tissue expansion).  We also discussed the time frame for reconstruction.  I shared information about saline vs. silicone implants, including their differences, risk of capsular contracture, rippling, or leak/rupture, and safety/monitoring issues.  I described Alloderm and its use for implant reconstruction. We discussed that radiation therapy, if she ultimately requires it, may alter the reconstructive options.     • We reviewed surgical risks including, but not limited to, infection, bleeding, hematoma, seroma, pain, scarring, numbness, skin or nipple loss, wound healing problems, flap failures including partial or complete flap loss, asymmetry, need for revisions or further surgeries,  and risks associated with anesthesia.   • Additional risks with autologous reconstruction include donor wound morbidities, such as hernias or bulges, wound healing problems, muscle weakness, partial or complete flap loss, and typical surgical risks as listed above.   • The use of biological mesh is not for soft tissue reinforcement. The use of mesh is necessary because the mastectomy dissection pocket is larger than the implant itself. The intention of the mesh is to restrain the displacement of the implant to the axilla, which is a very common complication requiring revision. In my experience, the use of mesh avoids that specific complication and very often avoids a second surgery. The use of mesh also allows me to perform a safer procedure since it holds the  implant in place and alleviates the pressure over the skin that depends only on the subdermal blood supply. Without the mesh, I believe I wouldn't be able to perform direct implant reconstruction and give the optimal result that patient desires and deserves.   •    • Specifically on her case,  I recommend bilateral breast reconstruction with implant and mesh, use of spy, possible expanders, I don't recommend to keep the nipples due to cosmetic outcome   • Consent: bilateral breast reconstruction with implant and mesh, use of spy, possible expanders, .   • Target implant size: max 600  cc  •   •   • CPT codes:   • 16305-23 - immediate insertion of breast prosthesis following reconstruction   • 71423-77  implantation of biologic implant  • 83501- intravenous injection of agent (SPY)  •      Implants Allergan   · Allergan [id number], 520, 560, 580, 615 x 3  · Expander: [id number] x2  · Single use sizer [id number] x1    Mesh:  • Mesh:  alloderm rectangle 16x20 perforated [id number] x2 (implants larger than 600cc)  OR time: 3 hours     • Maryse Wilkinson MD, PhD  • NPI: [id number]      Women's Health and Cancer Rights Act (WHCRA)  The Women's Health and Cancer Rights Act of 1998 (WHCRA) is a federal law that provides protections to patients who choose to have breast reconstruction in connection with a mastectomy.  Coverage must be provided for:    1.All stages of reconstruction of the breast on which the mastectomy has been performed;  2.Surgery and reconstruction of the other breast to produce a symmetrical appearance; and  Prostheses and treatment of physical complications of all stages of the mastectomy, including lymphedema.    This law applies to two different types of coverage:    1.Group health plans (provided by an employer or union);  2.Individual health insurance policies (not based on employment).      • Given these options, the patient has verbally expressed an understanding of the risks of surgery and finds  these risks acceptable. We will proceed with surgery as soon as possible.    • Medications sent to pharmacy    Cotinine Ordered    Pictures obtained    Scribed by Gilda Paige, acting as a scribe for Maryse Wilkinson MD, 04/17/23 10:33 EDT.  Maryse Wilkinson MD's signature on the note affirms that the note adequately documents the care provided.        I spent 60 minutes caring for Mariela on this date of service. This time includes time spent by me in the following activities:performing a medically appropriate examination and/or evaluation , counseling and educating the patient/family/caregiver, documenting information in the medical record, and care coordination      Scribed by Gilda Paige, acting as a scribe for Maryse Wilkinson MD, 04/17/23 10:33 EDT.  Maryse Wilkinson MD's signature on the note affirms that the note adequately documents the care provided.      Follow Up     No follow-ups on file.    Patient was given instructions and counseling regarding her condition. Please see specific information pulled into the AVS if appropriate.     Maryse Wilkinson MD  04/17/2023

## 2023-04-17 NOTE — TELEPHONE ENCOUNTER
Called Mariela Polanco regarding MRI results    No new findings. Known area of malignancy seen (R breast 12:00, TopHat clip, 1.1cm). Remainder of both breasts and lymph node areas normal.     Plan to proceed with surgery as planned: bilateral breast skin sparing mastectomy with right sentinel lymph node biopsy. Orders placed. Will schedule.     Miladis Mccormack MD

## 2023-04-18 LAB
COTININE UR QL SCN: NEGATIVE NG/ML
Lab: NORMAL

## 2023-04-19 ENCOUNTER — PREP FOR SURGERY (OUTPATIENT)
Dept: OTHER | Facility: HOSPITAL | Age: 58
End: 2023-04-19
Payer: COMMERCIAL

## 2023-04-19 ENCOUNTER — PRE-ADMISSION TESTING (OUTPATIENT)
Dept: PREADMISSION TESTING | Facility: HOSPITAL | Age: 58
End: 2023-04-19
Payer: COMMERCIAL

## 2023-04-19 VITALS
HEART RATE: 65 BPM | HEIGHT: 66 IN | BODY MASS INDEX: 26.36 KG/M2 | TEMPERATURE: 98.6 F | WEIGHT: 164 LBS | RESPIRATION RATE: 16 BRPM | SYSTOLIC BLOOD PRESSURE: 128 MMHG | DIASTOLIC BLOOD PRESSURE: 78 MMHG | OXYGEN SATURATION: 97 %

## 2023-04-19 DIAGNOSIS — N65.1 DISPROPORTION BETWEEN NATIVE BREAST AND RECONSTRUCTED BREAST: ICD-10-CM

## 2023-04-19 DIAGNOSIS — D05.11 DUCTAL CARCINOMA IN SITU (DCIS) OF RIGHT BREAST: Primary | ICD-10-CM

## 2023-04-19 LAB
ANION GAP SERPL CALCULATED.3IONS-SCNC: 11 MMOL/L (ref 5–15)
BUN SERPL-MCNC: 11 MG/DL (ref 6–20)
BUN/CREAT SERPL: 12.9 (ref 7–25)
CALCIUM SPEC-SCNC: 9.7 MG/DL (ref 8.6–10.5)
CHLORIDE SERPL-SCNC: 103 MMOL/L (ref 98–107)
CO2 SERPL-SCNC: 25 MMOL/L (ref 22–29)
CREAT SERPL-MCNC: 0.85 MG/DL (ref 0.57–1)
DEPRECATED RDW RBC AUTO: 39.9 FL (ref 37–54)
EGFRCR SERPLBLD CKD-EPI 2021: 80.5 ML/MIN/1.73
ERYTHROCYTE [DISTWIDTH] IN BLOOD BY AUTOMATED COUNT: 12.1 % (ref 12.3–15.4)
GLUCOSE SERPL-MCNC: 95 MG/DL (ref 65–99)
HCT VFR BLD AUTO: 40.3 % (ref 34–46.6)
HGB BLD-MCNC: 14.2 G/DL (ref 12–15.9)
MCH RBC QN AUTO: 31.3 PG (ref 26.6–33)
MCHC RBC AUTO-ENTMCNC: 35.2 G/DL (ref 31.5–35.7)
MCV RBC AUTO: 88.8 FL (ref 79–97)
PLATELET # BLD AUTO: 226 10*3/MM3 (ref 140–450)
PMV BLD AUTO: 8.9 FL (ref 6–12)
POTASSIUM SERPL-SCNC: 4.7 MMOL/L (ref 3.5–5.2)
RBC # BLD AUTO: 4.54 10*6/MM3 (ref 3.77–5.28)
SODIUM SERPL-SCNC: 139 MMOL/L (ref 136–145)
WBC NRBC COR # BLD: 8.02 10*3/MM3 (ref 3.4–10.8)

## 2023-04-19 PROCEDURE — 36415 COLL VENOUS BLD VENIPUNCTURE: CPT

## 2023-04-19 PROCEDURE — 85027 COMPLETE CBC AUTOMATED: CPT

## 2023-04-19 PROCEDURE — 80048 BASIC METABOLIC PNL TOTAL CA: CPT

## 2023-04-19 RX ORDER — ONDANSETRON 4 MG/1
4 TABLET, FILM COATED ORAL DAILY PRN
Qty: 20 TABLET | Refills: 0 | Status: ON HOLD | OUTPATIENT
Start: 2023-04-19 | End: 2024-04-18

## 2023-04-19 RX ORDER — CLINDAMYCIN HYDROCHLORIDE 75 MG/1
75 CAPSULE ORAL EVERY 6 HOURS SCHEDULED
Status: CANCELLED | OUTPATIENT
Start: 2023-04-19 | End: 2023-04-20

## 2023-04-19 RX ORDER — CLINDAMYCIN HYDROCHLORIDE 150 MG/1
150 CAPSULE ORAL 3 TIMES DAILY
Qty: 30 CAPSULE | Refills: 0 | Status: ON HOLD | OUTPATIENT
Start: 2023-04-19

## 2023-04-19 RX ORDER — OXYCODONE HYDROCHLORIDE AND ACETAMINOPHEN 5; 325 MG/1; MG/1
1-2 TABLET ORAL EVERY 6 HOURS PRN
Qty: 28 TABLET | Refills: 0 | Status: ON HOLD | OUTPATIENT
Start: 2023-04-19

## 2023-04-19 NOTE — DISCHARGE INSTRUCTIONS
Take the following medications the morning of surgery:  NONE    ARRIVE AT 1:30    If you are on prescription narcotic pain medication to control your pain you may also take that medication the morning of surgery.    General Instructions:  Do not eat solid food after midnight the night before surgery.  You may drink clear liquids day of surgery but must stop at least one hour before your hospital arrival time.  It is beneficial for you to have a clear drink that contains carbohydrates the day of surgery.  We suggest a 12 to 20 ounce bottle of Gatorade or Powerade for non-diabetic patients or a 12 to 20 ounce bottle of G2 or Powerade Zero for diabetic patients. (Pediatric patients, are not advised to drink a 12 to 20 ounce carbohydrate drink)    Clear liquids are liquids you can see through.  Nothing red in color.     Plain water                               Sports drinks  Sodas                                   Gelatin (Jell-O)  Fruit juices without pulp such as white grape juice and apple juice  Popsicles that contain no fruit or yogurt  Tea or coffee (no cream or milk added)  Gatorade / Powerade  G2 / Powerade Zero    Infants may have breast milk up to four hours before surgery.  Infants drinking formula may drink formula up to six hours before surgery.   Patients who avoid smoking, chewing tobacco and alcohol for 4 weeks prior to surgery have a reduced risk of post-operative complications.  Quit smoking as many days before surgery as you can.  Do not smoke, use chewing tobacco or drink alcohol the day of surgery.   If applicable bring your C-PAP/ BI-PAP machine in with you to preop day of surgery.  Bring any papers given to you in the doctor’s office.  Wear clean comfortable clothes.  Do not wear contact lenses, false eyelashes or make-up.  Bring a case for your glasses.   Bring crutches or walker if applicable.  Remove all piercings.  Leave jewelry and any other valuables at home.  Hair extensions with metal  clips must be removed prior to surgery.  The Pre-Admission Testing nurse will instruct you to bring medications if unable to obtain an accurate list in Pre-Admission Testing.          Preventing a Surgical Site Infection:  For 2 to 3 days before surgery, avoid shaving with a razor because the razor can irritate skin and make it easier to develop an infection.    Any areas of open skin can increase the risk of a post-operative wound infection by allowing bacteria to enter and travel throughout the body.  Notify your surgeon if you have any skin wounds / rashes even if it is not near the expected surgical site.  The area will need assessed to determine if surgery should be delayed until it is healed.  The night prior to surgery shower using a fresh bar of anti-bacterial soap (such as Dial) and clean washcloth.  Sleep in a clean bed with clean clothing.  Do not allow pets to sleep with you.  Shower on the morning of surgery using a fresh bar of anti-bacterial soap (such as Dial) and clean washcloth.  Dry with a clean towel and dress in clean clothing.  Ask your surgeon if you will be receiving antibiotics prior to surgery.  Make sure you, your family, and all healthcare providers clean their hands with soap and water or an alcohol based hand  before caring for you or your wound.    Day of surgery:  Your arrival time is approximately two hours before your scheduled surgery time.  Upon arrival, a Pre-op nurse and Anesthesiologist will review your health history, obtain vital signs, and answer questions you may have.  The only belongings needed at this time will be a list of your home medications and if applicable your C-PAP/BI-PAP machine.  A Pre-op nurse will start an IV and you may receive medication in preparation for surgery, including something to help you relax.     Please be aware that surgery does come with discomfort.  We want to make every effort to control your discomfort so please discuss any  uncontrolled symptoms with your nurse.   Your doctor will most likely have prescribed pain medications.      If you are going home after surgery you will receive individualized written care instructions before being discharged.  A responsible adult must drive you to and from the hospital on the day of your surgery and stay with you for 24 hours.  Discharge prescriptions can be filled by the hospital pharmacy during regular pharmacy hours.  If you are having surgery late in the day/evening your prescription may be e-prescribed to your pharmacy.  Please verify your pharmacy hours or chose a 24 hour pharmacy to avoid not having access to your prescription because your pharmacy has closed for the day.    If you are staying overnight following surgery, you will be transported to your hospital room following the recovery period.  [facility] has all private rooms.    If you have any questions please call Pre-Admission Testing at [phone].  Deductibles and co-payments are collected on the day of service. Please be prepared to pay the required co-pay, deductible or deposit on the day of service as defined by your plan.    Call your surgeon immediately if you experience any of the following symptoms:  Sore Throat  Shortness of Breath or difficulty breathing  Cough  Chills  Body soreness or muscle pain  Headache  Fever  New loss of taste or smell  Do not arrive for your surgery ill.  Your procedure will need to be rescheduled to another time.  You will need to call your physician before the day of surgery to avoid any unnecessary exposure to hospital staff as well as other patients.           CHLORHEXIDINE CLOTH INSTRUCTIONS  The morning of surgery follow these instructions using the Chlorhexidine cloths you've been given.  These steps reduce bacteria on the body.  Do not use the cloths near your eyes, ears mouth, genitalia or on open wounds.  Throw the cloths away after use but do not try to flush them  down a toilet.      Open and remove one cloth at a time from the package.    Leave the cloth unfolded and begin the bathing.  Massage the skin with the cloths using gentle pressure to remove bacteria.  Do not scrub harshly.   Follow the steps below with one 2% CHG cloth per area (6 total cloths).  One cloth for neck, shoulders and chest.  One cloth for both arms, hands, fingers and underarms (do underarms last).  One cloth for the abdomen followed by groin.  One cloth for right leg and foot including between the toes.  One cloth for left leg and foot including between the toes.  The last cloth is to be used for the back of the neck, back and buttocks.    Allow the CHG to air dry 3 minutes on the skin which will give it time to work and decrease the chance of irritation.  The skin may feel sticky until it is dry.  Do not rinse with water or any other liquid or you will lose the beneficial effects of the CHG.  If mild skin irritation occurs, do rinse the skin to remove the CHG.  Report this to the nurse at time of admission.  Do not apply lotions, creams, ointments, deodorants or perfumes after using the clothes. Dress in clean clothes before coming to the hospital.

## 2023-04-20 ENCOUNTER — PREP FOR SURGERY (OUTPATIENT)
Dept: OTHER | Facility: HOSPITAL | Age: 58
End: 2023-04-20
Payer: COMMERCIAL

## 2023-04-24 ENCOUNTER — HOSPITAL ENCOUNTER (OUTPATIENT)
Dept: NUCLEAR MEDICINE | Facility: HOSPITAL | Age: 58
Discharge: HOME | End: 2023-04-24
Payer: COMMERCIAL

## 2023-04-24 ENCOUNTER — HOSPITAL ENCOUNTER (OUTPATIENT)
Facility: HOSPITAL | Age: 58
Discharge: HOME | End: 2023-04-25
Attending: STUDENT IN AN ORGANIZED HEALTH CARE EDUCATION/TRAINING PROGRAM | Admitting: STUDENT IN AN ORGANIZED HEALTH CARE EDUCATION/TRAINING PROGRAM
Payer: COMMERCIAL

## 2023-04-24 ENCOUNTER — ANESTHESIA (OUTPATIENT)
Dept: PERIOP | Facility: HOSPITAL | Age: 58
End: 2023-04-24
Payer: COMMERCIAL

## 2023-04-24 ENCOUNTER — ANESTHESIA EVENT (OUTPATIENT)
Dept: PERIOP | Facility: HOSPITAL | Age: 58
End: 2023-04-24
Payer: COMMERCIAL

## 2023-04-24 DIAGNOSIS — N65.1 DISPROPORTION BETWEEN NATIVE BREAST AND RECONSTRUCTED BREAST: ICD-10-CM

## 2023-04-24 DIAGNOSIS — D05.11 DUCTAL CARCINOMA IN SITU (DCIS) OF RIGHT BREAST: ICD-10-CM

## 2023-04-24 PROCEDURE — 25010000002 HYDROMORPHONE PER 4 MG: Performed by: ANESTHESIOLOGY

## 2023-04-24 PROCEDURE — 25010000002 ONDANSETRON PER 1 MG: Performed by: ANESTHESIOLOGY

## 2023-04-24 PROCEDURE — 25010000002 HYDROMORPHONE 1 MG/ML SOLUTION: Performed by: ANESTHESIOLOGY

## 2023-04-24 PROCEDURE — 25010000002 FENTANYL CITRATE (PF) 50 MCG/ML SOLUTION: Performed by: ANESTHESIOLOGY

## 2023-04-24 PROCEDURE — 25010000002 SUGAMMADEX 200 MG/2ML SOLUTION: Performed by: NURSE ANESTHETIST, CERTIFIED REGISTERED

## 2023-04-24 PROCEDURE — 15860 IV NJX TST VASC FLO FLAP/GRF: CPT | Performed by: SURGERY

## 2023-04-24 PROCEDURE — 0 TECHETIUM TC99M TILMANOCEPT: Performed by: STUDENT IN AN ORGANIZED HEALTH CARE EDUCATION/TRAINING PROGRAM

## 2023-04-24 PROCEDURE — 19340 INSJ BREAST IMPLT SM D MAST: CPT | Performed by: SURGERY

## 2023-04-24 PROCEDURE — 88307 TISSUE EXAM BY PATHOLOGIST: CPT | Performed by: STUDENT IN AN ORGANIZED HEALTH CARE EDUCATION/TRAINING PROGRAM

## 2023-04-24 PROCEDURE — 25010000002 GENTAMICIN PER 80 MG: Performed by: SURGERY

## 2023-04-24 PROCEDURE — 25010000002 INDOCYANINE GREEN 25 MG RECONSTITUTED SOLUTION: Performed by: ANESTHESIOLOGY

## 2023-04-24 PROCEDURE — 25010000002 DEXAMETHASONE SODIUM PHOSPHATE 20 MG/5ML SOLUTION: Performed by: ANESTHESIOLOGY

## 2023-04-24 PROCEDURE — 25010000002 MIDAZOLAM PER 1 MG: Performed by: ANESTHESIOLOGY

## 2023-04-24 PROCEDURE — 38792 RA TRACER ID OF SENTINL NODE: CPT

## 2023-04-24 PROCEDURE — 88302 TISSUE EXAM BY PATHOLOGIST: CPT | Performed by: STUDENT IN AN ORGANIZED HEALTH CARE EDUCATION/TRAINING PROGRAM

## 2023-04-24 PROCEDURE — C1789 PROSTHESIS, BREAST, IMP: HCPCS | Performed by: STUDENT IN AN ORGANIZED HEALTH CARE EDUCATION/TRAINING PROGRAM

## 2023-04-24 PROCEDURE — 25010000002 ISOSULFAN BLUE 1 % SOLUTION: Performed by: STUDENT IN AN ORGANIZED HEALTH CARE EDUCATION/TRAINING PROGRAM

## 2023-04-24 PROCEDURE — 15777 ACELLULAR DERM MATRIX IMPLT: CPT | Performed by: SURGERY

## 2023-04-24 PROCEDURE — 25010000002 DEXAMETHASONE SODIUM PHOSPHATE 10 MG/ML SOLUTION 1 ML VIAL: Performed by: SURGERY

## 2023-04-24 PROCEDURE — 25010000002 PROPOFOL 10 MG/ML EMULSION: Performed by: ANESTHESIOLOGY

## 2023-04-24 PROCEDURE — A9520 TC99 TILMANOCEPT DIAG 0.5MCI: HCPCS | Performed by: STUDENT IN AN ORGANIZED HEALTH CARE EDUCATION/TRAINING PROGRAM

## 2023-04-24 DEVICE — GRFT TISS ALLODERM RTM PERF 16X20CM 2.4MM/THK .4MM: Type: IMPLANTABLE DEVICE | Site: BREAST | Status: FUNCTIONAL

## 2023-04-24 DEVICE — LIGACLIP MCA MULTIPLE CLIP APPLIERS, 20 MEDIUM CLIPS
Type: IMPLANTABLE DEVICE | Site: BREAST | Status: FUNCTIONAL
Brand: LIGACLIP

## 2023-04-24 DEVICE — BRST SIL NATRELLE INSPIRA SMOTH F/P 650CC: Type: IMPLANTABLE DEVICE | Site: BREAST | Status: FUNCTIONAL

## 2023-04-24 RX ORDER — SODIUM CHLORIDE 0.9 % (FLUSH) 0.9 %
3-10 SYRINGE (ML) INJECTION AS NEEDED
Status: DISCONTINUED | OUTPATIENT
Start: 2023-04-24 | End: 2023-04-24 | Stop reason: HOSPADM

## 2023-04-24 RX ORDER — LIDOCAINE HYDROCHLORIDE 20 MG/ML
INJECTION, SOLUTION INFILTRATION; PERINEURAL AS NEEDED
Status: DISCONTINUED | OUTPATIENT
Start: 2023-04-24 | End: 2023-04-24 | Stop reason: SURG

## 2023-04-24 RX ORDER — LIDOCAINE AND PRILOCAINE 25; 25 MG/G; MG/G
1 CREAM TOPICAL ONCE
Status: COMPLETED | OUTPATIENT
Start: 2023-04-24 | End: 2023-04-24

## 2023-04-24 RX ORDER — BUPROPION HYDROCHLORIDE 300 MG/1
300 TABLET ORAL NIGHTLY
Status: DISCONTINUED | OUTPATIENT
Start: 2023-04-25 | End: 2023-04-25 | Stop reason: HOSPADM

## 2023-04-24 RX ORDER — MIDAZOLAM HYDROCHLORIDE 1 MG/ML
1 INJECTION INTRAMUSCULAR; INTRAVENOUS
Status: DISCONTINUED | OUTPATIENT
Start: 2023-04-24 | End: 2023-04-24 | Stop reason: HOSPADM

## 2023-04-24 RX ORDER — SODIUM CHLORIDE, SODIUM LACTATE, POTASSIUM CHLORIDE, CALCIUM CHLORIDE 600; 310; 30; 20 MG/100ML; MG/100ML; MG/100ML; MG/100ML
9 INJECTION, SOLUTION INTRAVENOUS CONTINUOUS
Status: DISCONTINUED | OUTPATIENT
Start: 2023-04-24 | End: 2023-04-25 | Stop reason: HOSPADM

## 2023-04-24 RX ORDER — FENTANYL CITRATE 50 UG/ML
INJECTION, SOLUTION INTRAMUSCULAR; INTRAVENOUS AS NEEDED
Status: DISCONTINUED | OUTPATIENT
Start: 2023-04-24 | End: 2023-04-24 | Stop reason: SURG

## 2023-04-24 RX ORDER — ACETAMINOPHEN 325 MG/1
650 TABLET ORAL EVERY 4 HOURS PRN
Status: DISCONTINUED | OUTPATIENT
Start: 2023-04-24 | End: 2023-04-25 | Stop reason: HOSPADM

## 2023-04-24 RX ORDER — DROPERIDOL 2.5 MG/ML
0.62 INJECTION, SOLUTION INTRAMUSCULAR; INTRAVENOUS
Status: DISCONTINUED | OUTPATIENT
Start: 2023-04-24 | End: 2023-04-24 | Stop reason: HOSPADM

## 2023-04-24 RX ORDER — HYDRALAZINE HYDROCHLORIDE 20 MG/ML
5 INJECTION INTRAMUSCULAR; INTRAVENOUS
Status: DISCONTINUED | OUTPATIENT
Start: 2023-04-24 | End: 2023-04-24 | Stop reason: HOSPADM

## 2023-04-24 RX ORDER — OXYCODONE HYDROCHLORIDE AND ACETAMINOPHEN 5; 325 MG/1; MG/1
2 TABLET ORAL EVERY 4 HOURS PRN
Status: DISCONTINUED | OUTPATIENT
Start: 2023-04-24 | End: 2023-04-25 | Stop reason: HOSPADM

## 2023-04-24 RX ORDER — PROPOFOL 10 MG/ML
VIAL (ML) INTRAVENOUS AS NEEDED
Status: DISCONTINUED | OUTPATIENT
Start: 2023-04-24 | End: 2023-04-24 | Stop reason: SURG

## 2023-04-24 RX ORDER — FENTANYL CITRATE 50 UG/ML
50 INJECTION, SOLUTION INTRAMUSCULAR; INTRAVENOUS
Status: DISCONTINUED | OUTPATIENT
Start: 2023-04-24 | End: 2023-04-24 | Stop reason: HOSPADM

## 2023-04-24 RX ORDER — DEXAMETHASONE SODIUM PHOSPHATE 4 MG/ML
INJECTION, SOLUTION INTRA-ARTICULAR; INTRALESIONAL; INTRAMUSCULAR; INTRAVENOUS; SOFT TISSUE AS NEEDED
Status: DISCONTINUED | OUTPATIENT
Start: 2023-04-24 | End: 2023-04-24 | Stop reason: SURG

## 2023-04-24 RX ORDER — ONDANSETRON 2 MG/ML
4 INJECTION INTRAMUSCULAR; INTRAVENOUS EVERY 6 HOURS PRN
Status: DISCONTINUED | OUTPATIENT
Start: 2023-04-24 | End: 2023-04-25 | Stop reason: HOSPADM

## 2023-04-24 RX ORDER — PANTOPRAZOLE SODIUM 40 MG/1
40 TABLET, DELAYED RELEASE ORAL NIGHTLY
Status: DISCONTINUED | OUTPATIENT
Start: 2023-04-25 | End: 2023-04-25 | Stop reason: HOSPADM

## 2023-04-24 RX ORDER — INDOCYANINE GREEN AND WATER 25 MG
KIT INJECTION AS NEEDED
Status: DISCONTINUED | OUTPATIENT
Start: 2023-04-24 | End: 2023-04-24 | Stop reason: SURG

## 2023-04-24 RX ORDER — CLINDAMYCIN HYDROCHLORIDE 75 MG/1
75 CAPSULE ORAL EVERY 6 HOURS SCHEDULED
Status: DISCONTINUED | OUTPATIENT
Start: 2023-04-24 | End: 2023-04-24

## 2023-04-24 RX ORDER — TRAZODONE HYDROCHLORIDE 50 MG/1
50 TABLET ORAL NIGHTLY
Status: DISCONTINUED | OUTPATIENT
Start: 2023-04-25 | End: 2023-04-25 | Stop reason: HOSPADM

## 2023-04-24 RX ORDER — HYDROMORPHONE HYDROCHLORIDE 1 MG/ML
0.5 INJECTION, SOLUTION INTRAMUSCULAR; INTRAVENOUS; SUBCUTANEOUS
Status: DISCONTINUED | OUTPATIENT
Start: 2023-04-24 | End: 2023-04-25 | Stop reason: HOSPADM

## 2023-04-24 RX ORDER — FAMOTIDINE 10 MG/ML
20 INJECTION, SOLUTION INTRAVENOUS ONCE
Status: COMPLETED | OUTPATIENT
Start: 2023-04-24 | End: 2023-04-24

## 2023-04-24 RX ORDER — SCOLOPAMINE TRANSDERMAL SYSTEM 1 MG/1
1 PATCH, EXTENDED RELEASE TRANSDERMAL ONCE
Status: DISCONTINUED | OUTPATIENT
Start: 2023-04-24 | End: 2023-04-24

## 2023-04-24 RX ORDER — ONDANSETRON 4 MG/1
4 TABLET, FILM COATED ORAL EVERY 6 HOURS PRN
Status: DISCONTINUED | OUTPATIENT
Start: 2023-04-24 | End: 2023-04-25 | Stop reason: HOSPADM

## 2023-04-24 RX ORDER — PROMETHAZINE HYDROCHLORIDE 25 MG/1
25 SUPPOSITORY RECTAL ONCE AS NEEDED
Status: DISCONTINUED | OUTPATIENT
Start: 2023-04-24 | End: 2023-04-24 | Stop reason: HOSPADM

## 2023-04-24 RX ORDER — ONDANSETRON 2 MG/ML
4 INJECTION INTRAMUSCULAR; INTRAVENOUS ONCE AS NEEDED
Status: DISCONTINUED | OUTPATIENT
Start: 2023-04-24 | End: 2023-04-24 | Stop reason: HOSPADM

## 2023-04-24 RX ORDER — AMLODIPINE BESYLATE 5 MG/1
5 TABLET ORAL DAILY
Status: DISCONTINUED | OUTPATIENT
Start: 2023-04-25 | End: 2023-04-25 | Stop reason: HOSPADM

## 2023-04-24 RX ORDER — LIDOCAINE HYDROCHLORIDE 10 MG/ML
0.5 INJECTION, SOLUTION EPIDURAL; INFILTRATION; INTRACAUDAL; PERINEURAL ONCE AS NEEDED
Status: DISCONTINUED | OUTPATIENT
Start: 2023-04-24 | End: 2023-04-24 | Stop reason: HOSPADM

## 2023-04-24 RX ORDER — EPHEDRINE SULFATE 50 MG/ML
5 INJECTION, SOLUTION INTRAVENOUS ONCE AS NEEDED
Status: DISCONTINUED | OUTPATIENT
Start: 2023-04-24 | End: 2023-04-24 | Stop reason: HOSPADM

## 2023-04-24 RX ORDER — IPRATROPIUM BROMIDE AND ALBUTEROL SULFATE 2.5; .5 MG/3ML; MG/3ML
3 SOLUTION RESPIRATORY (INHALATION) ONCE AS NEEDED
Status: DISCONTINUED | OUTPATIENT
Start: 2023-04-24 | End: 2023-04-24 | Stop reason: HOSPADM

## 2023-04-24 RX ORDER — ROCURONIUM BROMIDE 10 MG/ML
INJECTION, SOLUTION INTRAVENOUS AS NEEDED
Status: DISCONTINUED | OUTPATIENT
Start: 2023-04-24 | End: 2023-04-24 | Stop reason: SURG

## 2023-04-24 RX ORDER — CEFAZOLIN SODIUM 2 G/100ML
2 INJECTION, SOLUTION INTRAVENOUS ONCE
Status: DISCONTINUED | OUTPATIENT
Start: 2023-04-24 | End: 2023-04-24

## 2023-04-24 RX ORDER — FLUMAZENIL 0.1 MG/ML
0.2 INJECTION INTRAVENOUS AS NEEDED
Status: DISCONTINUED | OUTPATIENT
Start: 2023-04-24 | End: 2023-04-24 | Stop reason: HOSPADM

## 2023-04-24 RX ORDER — NALOXONE HCL 0.4 MG/ML
0.1 VIAL (ML) INJECTION
Status: DISCONTINUED | OUTPATIENT
Start: 2023-04-24 | End: 2023-04-25 | Stop reason: HOSPADM

## 2023-04-24 RX ORDER — DEXTROSE AND SODIUM CHLORIDE 5; .45 G/100ML; G/100ML
75 INJECTION, SOLUTION INTRAVENOUS CONTINUOUS
Status: DISCONTINUED | OUTPATIENT
Start: 2023-04-25 | End: 2023-04-25 | Stop reason: HOSPADM

## 2023-04-24 RX ORDER — AMOXICILLIN 250 MG
2 CAPSULE ORAL 2 TIMES DAILY PRN
Status: DISCONTINUED | OUTPATIENT
Start: 2023-04-24 | End: 2023-04-25 | Stop reason: HOSPADM

## 2023-04-24 RX ORDER — ISOSULFAN BLUE 50 MG/5ML
INJECTION, SOLUTION SUBCUTANEOUS AS NEEDED
Status: DISCONTINUED | OUTPATIENT
Start: 2023-04-24 | End: 2023-04-24 | Stop reason: HOSPADM

## 2023-04-24 RX ORDER — DIAZEPAM 5 MG/1
10 TABLET ORAL ONCE
Status: COMPLETED | OUTPATIENT
Start: 2023-04-24 | End: 2023-04-24

## 2023-04-24 RX ORDER — NALOXONE HCL 0.4 MG/ML
0.2 VIAL (ML) INJECTION AS NEEDED
Status: DISCONTINUED | OUTPATIENT
Start: 2023-04-24 | End: 2023-04-24 | Stop reason: HOSPADM

## 2023-04-24 RX ORDER — LABETALOL HYDROCHLORIDE 5 MG/ML
5 INJECTION, SOLUTION INTRAVENOUS
Status: DISCONTINUED | OUTPATIENT
Start: 2023-04-24 | End: 2023-04-24 | Stop reason: HOSPADM

## 2023-04-24 RX ORDER — OXYCODONE AND ACETAMINOPHEN 7.5; 325 MG/1; MG/1
1 TABLET ORAL EVERY 4 HOURS PRN
Status: DISCONTINUED | OUTPATIENT
Start: 2023-04-24 | End: 2023-04-24 | Stop reason: HOSPADM

## 2023-04-24 RX ORDER — CLINDAMYCIN PHOSPHATE 900 MG/50ML
900 INJECTION INTRAVENOUS ONCE
Status: COMPLETED | OUTPATIENT
Start: 2023-04-24 | End: 2023-04-24

## 2023-04-24 RX ORDER — DIPHENHYDRAMINE HYDROCHLORIDE 50 MG/ML
12.5 INJECTION INTRAMUSCULAR; INTRAVENOUS
Status: DISCONTINUED | OUTPATIENT
Start: 2023-04-24 | End: 2023-04-24 | Stop reason: HOSPADM

## 2023-04-24 RX ORDER — HYDROMORPHONE HYDROCHLORIDE 1 MG/ML
0.5 INJECTION, SOLUTION INTRAMUSCULAR; INTRAVENOUS; SUBCUTANEOUS
Status: DISCONTINUED | OUTPATIENT
Start: 2023-04-24 | End: 2023-04-24 | Stop reason: HOSPADM

## 2023-04-24 RX ORDER — HYDROCODONE BITARTRATE AND ACETAMINOPHEN 7.5; 325 MG/1; MG/1
1 TABLET ORAL ONCE AS NEEDED
Status: DISCONTINUED | OUTPATIENT
Start: 2023-04-24 | End: 2023-04-24 | Stop reason: HOSPADM

## 2023-04-24 RX ORDER — ACETAMINOPHEN 650 MG/1
650 SUPPOSITORY RECTAL EVERY 4 HOURS PRN
Status: DISCONTINUED | OUTPATIENT
Start: 2023-04-24 | End: 2023-04-25 | Stop reason: HOSPADM

## 2023-04-24 RX ORDER — CLINDAMYCIN HYDROCHLORIDE 300 MG/1
300 CAPSULE ORAL EVERY 8 HOURS
Status: DISCONTINUED | OUTPATIENT
Start: 2023-04-25 | End: 2023-04-25 | Stop reason: HOSPADM

## 2023-04-24 RX ORDER — ONDANSETRON 2 MG/ML
INJECTION INTRAMUSCULAR; INTRAVENOUS AS NEEDED
Status: DISCONTINUED | OUTPATIENT
Start: 2023-04-24 | End: 2023-04-24 | Stop reason: SURG

## 2023-04-24 RX ORDER — PROMETHAZINE HYDROCHLORIDE 25 MG/1
25 TABLET ORAL ONCE AS NEEDED
Status: DISCONTINUED | OUTPATIENT
Start: 2023-04-24 | End: 2023-04-24 | Stop reason: HOSPADM

## 2023-04-24 RX ORDER — SODIUM CHLORIDE 0.9 % (FLUSH) 0.9 %
3 SYRINGE (ML) INJECTION EVERY 12 HOURS SCHEDULED
Status: DISCONTINUED | OUTPATIENT
Start: 2023-04-24 | End: 2023-04-24 | Stop reason: HOSPADM

## 2023-04-24 RX ORDER — MIDAZOLAM HYDROCHLORIDE 1 MG/ML
1 INJECTION INTRAMUSCULAR; INTRAVENOUS
Status: COMPLETED | OUTPATIENT
Start: 2023-04-24 | End: 2023-04-24

## 2023-04-24 RX ORDER — FAMOTIDINE 10 MG/ML
20 INJECTION, SOLUTION INTRAVENOUS ONCE
Status: DISCONTINUED | OUTPATIENT
Start: 2023-04-24 | End: 2023-04-24 | Stop reason: HOSPADM

## 2023-04-24 RX ADMIN — ONDANSETRON 4 MG: 2 INJECTION INTRAMUSCULAR; INTRAVENOUS at 21:07

## 2023-04-24 RX ADMIN — HYDROMORPHONE HYDROCHLORIDE 0.5 MG: 1 INJECTION, SOLUTION INTRAMUSCULAR; INTRAVENOUS; SUBCUTANEOUS at 19:47

## 2023-04-24 RX ADMIN — INDOCYANINE GREEN 25 MG: KIT INTRAVENOUS at 19:26

## 2023-04-24 RX ADMIN — HYDROMORPHONE HYDROCHLORIDE 0.5 MG: 1 INJECTION, SOLUTION INTRAMUSCULAR; INTRAVENOUS; SUBCUTANEOUS at 22:13

## 2023-04-24 RX ADMIN — PROPOFOL 200 MG: 10 INJECTION, EMULSION INTRAVENOUS at 18:29

## 2023-04-24 RX ADMIN — SCOPALAMINE 1 PATCH: 1 PATCH, EXTENDED RELEASE TRANSDERMAL at 15:26

## 2023-04-24 RX ADMIN — FENTANYL CITRATE 50 MCG: 50 INJECTION, SOLUTION INTRAMUSCULAR; INTRAVENOUS at 22:04

## 2023-04-24 RX ADMIN — DEXAMETHASONE SODIUM PHOSPHATE 8 MG: 4 INJECTION, SOLUTION INTRAMUSCULAR; INTRAVENOUS at 18:39

## 2023-04-24 RX ADMIN — DIAZEPAM 10 MG: 5 TABLET ORAL at 15:09

## 2023-04-24 RX ADMIN — SODIUM CHLORIDE, POTASSIUM CHLORIDE, SODIUM LACTATE AND CALCIUM CHLORIDE 9 ML/HR: 600; 310; 30; 20 INJECTION, SOLUTION INTRAVENOUS at 15:15

## 2023-04-24 RX ADMIN — FENTANYL CITRATE 50 MCG: 50 INJECTION, SOLUTION INTRAMUSCULAR; INTRAVENOUS at 18:28

## 2023-04-24 RX ADMIN — TILMANOCEPT 1 DOSE: KIT at 15:40

## 2023-04-24 RX ADMIN — SODIUM CHLORIDE, POTASSIUM CHLORIDE, SODIUM LACTATE AND CALCIUM CHLORIDE: 600; 310; 30; 20 INJECTION, SOLUTION INTRAVENOUS at 19:51

## 2023-04-24 RX ADMIN — HYDROMORPHONE HYDROCHLORIDE 0.5 MG: 1 INJECTION, SOLUTION INTRAMUSCULAR; INTRAVENOUS; SUBCUTANEOUS at 20:45

## 2023-04-24 RX ADMIN — ROCURONIUM BROMIDE 30 MG: 10 INJECTION, SOLUTION INTRAVENOUS at 18:30

## 2023-04-24 RX ADMIN — HYDROMORPHONE HYDROCHLORIDE 0.5 MG: 1 INJECTION, SOLUTION INTRAMUSCULAR; INTRAVENOUS; SUBCUTANEOUS at 22:44

## 2023-04-24 RX ADMIN — LIDOCAINE HYDROCHLORIDE 60 MG: 20 INJECTION, SOLUTION INFILTRATION; PERINEURAL at 18:27

## 2023-04-24 RX ADMIN — CLINDAMYCIN IN 5 PERCENT DEXTROSE 900 MG: 18 INJECTION, SOLUTION INTRAVENOUS at 18:16

## 2023-04-24 RX ADMIN — LIDOCAINE AND PRILOCAINE 1 APPLICATION: 25; 25 CREAM TOPICAL at 14:15

## 2023-04-24 RX ADMIN — FAMOTIDINE 20 MG: 10 INJECTION INTRAVENOUS at 15:26

## 2023-04-24 RX ADMIN — MIDAZOLAM 0.5 MG: 1 INJECTION INTRAMUSCULAR; INTRAVENOUS at 17:46

## 2023-04-24 RX ADMIN — MIDAZOLAM 0.5 MG: 1 INJECTION INTRAMUSCULAR; INTRAVENOUS at 17:36

## 2023-04-24 RX ADMIN — SUGAMMADEX 200 MG: 100 INJECTION, SOLUTION INTRAVENOUS at 21:13

## 2023-04-24 RX ADMIN — HYDROMORPHONE HYDROCHLORIDE 0.5 MG: 1 INJECTION, SOLUTION INTRAMUSCULAR; INTRAVENOUS; SUBCUTANEOUS at 23:08

## 2023-04-24 NOTE — INTERVAL H&P NOTE
H&P reviewed. The patient was examined and there are no changes to the H&P.    Patient is not tachycardic  Respirations are non elaborated  Vitals:    04/24/23 1505   BP: 117/75   Pulse: 63   Resp: 16   Temp: 98.7 °F (37.1 °C)   SpO2: 97%     Risks and benefits reminded to patient who agrees to proceed

## 2023-04-24 NOTE — ANESTHESIA PROCEDURE NOTES
Airway  Urgency: elective    Date/Time: 4/24/2023 6:33 PM  Airway not difficult    General Information and Staff    Patient location during procedure: OR  Anesthesiologist: Kush Grissom MD    Indications and Patient Condition  Indications for airway management: airway protection    Preoxygenated: yes  Mask difficulty assessment: 1 - vent by mask    Final Airway Details  Final airway type: endotracheal airway      Successful airway: ETT  Cuffed: yes   Successful intubation technique: direct laryngoscopy  Endotracheal tube insertion site: oral  Blade: Cason  Blade size: 2  ETT size (mm): 7.0  Cormack-Lehane Classification: grade I - full view of glottis  Placement verified by: chest auscultation and capnometry   Measured from: teeth  ETT/EBT  to teeth (cm): 19  Number of attempts at approach: 1  Assessment: lips, teeth, and gum same as pre-op and atraumatic intubation    Additional Comments  Pre oxygenated  Easy mask vent  Atraumatic intubation  + etco2  Breath sounds equal bilaterally

## 2023-04-24 NOTE — ANESTHESIA PREPROCEDURE EVALUATION
Anesthesia Evaluation     Patient summary reviewed and Nursing notes reviewed                Airway   Mallampati: II  TM distance: >3 FB  Neck ROM: full  Dental      Pulmonary - negative pulmonary ROS   Cardiovascular     ECG reviewed  Rhythm: regular  Rate: normal    (+) hypertension,       Neuro/Psych  (+) psychiatric history Anxiety and Depression,    GI/Hepatic/Renal/Endo    (+)  GERD,  liver disease fatty liver disease,     Musculoskeletal (-) negative ROS    Abdominal    Substance History - negative use     OB/GYN negative ob/gyn ROS         Other      history of cancer                  Anesthesia Plan    ASA 2     general     (I have reviewed the patient's history with the patient and the chart, including all pertinent laboratory results and imaging. I have explained the risks of anesthesia including but not limited to dental damage, corneal abrasion, nerve injury, MI, stroke, and death. Questions asked and answered. Anesthetic plan discussed with patient and team as indicated. Patient expressed understanding of the above.  )  intravenous induction     Anesthetic plan, risks, benefits, and alternatives have been provided, discussed and informed consent has been obtained with: patient and spouse/significant other.        CODE STATUS:

## 2023-04-25 ENCOUNTER — ANCILLARY PROCEDURE (OUTPATIENT)
Dept: LAB | Facility: HOSPITAL | Age: 58
End: 2023-04-25
Payer: COMMERCIAL

## 2023-04-25 ENCOUNTER — TRANSCRIBE ORDERS (OUTPATIENT)
Dept: LAB | Facility: HOSPITAL | Age: 58
End: 2023-04-25
Payer: COMMERCIAL

## 2023-04-25 VITALS
OXYGEN SATURATION: 96 % | SYSTOLIC BLOOD PRESSURE: 104 MMHG | HEIGHT: 66 IN | RESPIRATION RATE: 18 BRPM | BODY MASS INDEX: 26.47 KG/M2 | DIASTOLIC BLOOD PRESSURE: 68 MMHG | TEMPERATURE: 97.2 F | HEART RATE: 70 BPM

## 2023-04-25 DIAGNOSIS — C50.811: Primary | ICD-10-CM

## 2023-04-25 DIAGNOSIS — C50.811: ICD-10-CM

## 2023-04-25 PROCEDURE — 25010000002 HYDROMORPHONE PER 4 MG: Performed by: SURGERY

## 2023-04-25 PROCEDURE — 25010000002 ONDANSETRON PER 1 MG: Performed by: SURGERY

## 2023-04-25 PROCEDURE — 76098 X-RAY EXAM SURGICAL SPECIMEN: CPT

## 2023-04-25 RX ORDER — PANTOPRAZOLE SODIUM 40 MG/1
40 TABLET, DELAYED RELEASE ORAL
Status: DISCONTINUED | OUTPATIENT
Start: 2023-04-25 | End: 2023-04-25 | Stop reason: SDUPTHER

## 2023-04-25 RX ADMIN — DEXTROSE AND SODIUM CHLORIDE 75 ML/HR: 5; 450 INJECTION, SOLUTION INTRAVENOUS at 00:47

## 2023-04-25 RX ADMIN — ONDANSETRON 4 MG: 2 INJECTION INTRAMUSCULAR; INTRAVENOUS at 11:54

## 2023-04-25 RX ADMIN — BUPROPION HYDROCHLORIDE 300 MG: 300 TABLET, EXTENDED RELEASE ORAL at 02:22

## 2023-04-25 RX ADMIN — ONDANSETRON 4 MG: 2 INJECTION INTRAMUSCULAR; INTRAVENOUS at 03:01

## 2023-04-25 RX ADMIN — OXYCODONE AND ACETAMINOPHEN 2 TABLET: 5; 325 TABLET ORAL at 08:44

## 2023-04-25 RX ADMIN — PANTOPRAZOLE SODIUM 40 MG: 40 TABLET, DELAYED RELEASE ORAL at 12:04

## 2023-04-25 RX ADMIN — CLINDAMYCIN HYDROCHLORIDE 300 MG: 300 CAPSULE ORAL at 08:46

## 2023-04-25 RX ADMIN — CLINDAMYCIN HYDROCHLORIDE 300 MG: 300 CAPSULE ORAL at 02:22

## 2023-04-25 RX ADMIN — OXYCODONE AND ACETAMINOPHEN 2 TABLET: 5; 325 TABLET ORAL at 14:01

## 2023-04-25 RX ADMIN — HYDROMORPHONE HYDROCHLORIDE 0.5 MG: 1 INJECTION, SOLUTION INTRAMUSCULAR; INTRAVENOUS; SUBCUTANEOUS at 05:23

## 2023-04-25 RX ADMIN — PANTOPRAZOLE SODIUM 40 MG: 40 TABLET, DELAYED RELEASE ORAL at 02:22

## 2023-04-25 NOTE — OP NOTE
OPERATIVE REPORT     DATE: 4/24/2023     SURGEON: Miladis Mccormack MD      ASSISTANT: Andreea Hung, who was present for necessary suctioning, retracting, suturing throughout the procedure      OPERATION PERFORMED: bilateral  breast skin sparing mastectomy with right sentinel lymph node biopsy      PREOPERATIVE DIAGNOSIS: DCIS of the right breast      POSTOPERATIVE DIAGNOSIS: Same     ANESTHESIA: General     SPECIMEN:   Left breast (stitch at 12:00)  Right breast (stitch at 12:00)    right axillary sentinel lymph node #1   right axillary sentinel lymph node #2     DRAINS: None     BLOOD LOSS: Minimal     INDICATION FOR OPERATION: Mariela Polanco is a 57 y.o. lady who was recently diagnosed with right breast DCIS. She ultimately elected to proceed with bilateral breast skin sparing mastectomies with right sentinel lymph node biopsy. All risks (including bleeding, infection, damage to surrounding structures, need for further surgery, positive margins ), benefits and alternatives were explained to the patient who agreed and wished to proceed. Informed consent was signed.      OPERATIVE COURSE: The patient was taken to the operating room, transferred onto the operating room table, and underwent anesthesia without incident. 5 cc of blue dye was injected into the dermal layer of the right nipple areolar complex. The patient was prepped and draped in sterile fashion. A time out was performed and preoperative antibiotics were given.     We began with the left mastectomy. An elliptical incision was drawn around the nipple areolar complex by Dr. Wilkinson. Half percent Marcaine was injected into the skin and subcutaneous tissues. A scalpel was used to transect the skin and dermal layer. Subcutaneous flaps were created approximately 1cm in thickness circumferentially. These were extended to the clavicle superiorly, the sternum medially, the inframammary fold inferiorly, and the serratus laterally. Once this was  circumferentially dissected free with Bovie electrocautery, the breast was removed from the chest wall at the level of the pectoralis fascia. It was marked as listed above and passed off for fresh permanent specimen. The area was irrigated and appeared hemostatic. Bovie electrocautery was used for any small muscle bleeding. The remainder of the half percent Marcaine with epinephrine was injected into the intercostal nerve bundles and the subcutaneous tissue.    We then performed the right mastectomy. An elliptical incision was drawn around the nipple areolar complex by Dr. Wilkinson. Half percent Marcaine was injected into the skin and subcutaneous tissues. A scalpel was used to transect the skin and dermal layer. Subcutaneous flaps were created approximately 1cm in thickness circumferentially. These were extended to the clavicle superiorly, the sternum medially, the inframammary fold inferiorly, and the serratus laterally. Once this was circumferentially dissected free with Bovie electrocautery, the breast was removed from the chest wall at the level of the pectoralis fascia. It was marked as listed above and passed off for fresh permanent specimen. The area was irrigated and appeared hemostatic. Bovie electrocautery was used for any small muscle bleeding. The remainder of the half percent Marcaine with epinephrine was injected into the intercostal nerve bundles and the subcutaneous tissue.    We then performed the right sentinel lymph node biopsy. Bovie electrocautery was used to dissect down through the subcutaneous tissues and through the clavipectoral fascia into the axilla. Two sentinel lymph nodes were identified. These were removed with Bovie electrocautery and clips across all major lymphovascular structures. Once this was done, there were no hot, blue, or palpable lymph nodes remaining. The area was irrigated and appeared hemostatic. They were sent for permanent specimen.     Dr. Wilkinson performed the  reconstruction The patient tolerated the procedure well. All needle and lap counts were correct at the end of the case. The patient was then awoken from anesthesia and taken to recovery for further monitoring.     [place] Node Biopsy for Breast Cancer - Right  Operation performed with curative intent. Yes   Tracer(s) used to identify sentinel nodes in the upfront surgery (non-neoadjuvant) setting (select all that apply). Dye and Radioactive tracer   Tracer(s) used to identify sentinel nodes in the neoadjuvant setting (select all that apply). N/A   All nodes (colored or non-colored) present at the end of a dye-filled lymphatic channel were removed. Yes   All significantly radioactive nodes were removed. Yes   All palpably suspicious nodes were removed. Yes   Biopsy-proven positive nodes marked with clips prior to chemotherapy were identified and removed. N/A             Miladis Mccormack MD   General and Endoscopic Surgery  [facility]     [address], [address]  [place], [place], [address]  P: [phone]  F: [phone]

## 2023-04-25 NOTE — PLAN OF CARE
Goal Outcome Evaluation:           Progress: improving  Outcome Evaluation: VSS, JAN x 2, medicated for nausea and pain, educated on drain care, voiding freely, limb precautions initiated, SCD's, spouse at bedside.

## 2023-04-25 NOTE — PROGRESS NOTES
PLASTIC SURGERY PROGRESS NOTE    Patient Identification:  Name: Mariela Polanco    Age: 57 y.o.    Sex: female   :  1965  MRN: [id number]               Subjective:  No acute events.    Objective:    Continuous Infusions:dextrose 5 % and sodium chloride 0.45 %, 75 mL/hr, Last Rate: 75 mL/hr (23 0047)  lactated ringers, 9 mL/hr  lactated ringers, 9 mL/hr, Last Rate: 9 mL/hr (23 1823)        Scheduled Meds:amLODIPine, 5 mg, Oral, Daily  buPROPion XL, 300 mg, Oral, Nightly  clindamycin, 300 mg, Oral, Q8H  pantoprazole, 40 mg, Oral, Nightly  traZODone, 50 mg, Oral, Nightly      PRN Meds:•  acetaminophen **OR** acetaminophen  •  HYDROmorphone **AND** naloxone  •  ondansetron **OR** ondansetron  •  oxyCODONE-acetaminophen  •  senna-docusate sodium    Vital signs in last 24 hours:  Vitals:    23 2240 23 2310 23 2320 23 0100   BP: 138/89 143/84 126/83 98/61   BP Location: Right arm Right arm Left arm Left arm   Patient Position: Lying  Lying Lying   Pulse: 74 84 82 84   Resp: 16 16 16 16   Temp:  97.8 °F (36.6 °C) 97.8 °F (36.6 °C) 97.3 °F (36.3 °C)   TempSrc:  Oral Oral Oral   SpO2: 99% 99% 98% 97%   Height:           Intake/Output:I/O last 3 completed shifts:  In: 500 [I.V.:500]  Out: -     Exam:  GEN: no acute distress, resting quietly   HEENT: NCAT, EOMI, MMM   HEART: normal rate, regular rhythm   LUNGS: respirations non-labored   ABD: soft, nondistended, nontender   EXT: moves all extremities, no edema      No results found for this or any previous visit (from the past 24 hour(s)).      Assessment:    Ductal carcinoma in situ (DCIS) of right breast      1 Day Post-Op Procedure(s) (LRB):  bilateral breast skin sparing mastectomy with right sentinel lymph node biopsy. (Right)  BREAST RECONSTRUCTION, bilateral breast reconstruction with implant, mesh, use of spy (Bilateral)    Plan:  Home today  Maryse Wilkinson MD    2023

## 2023-04-25 NOTE — PLAN OF CARE
Goal Outcome Evaluation:      Went over the plan of care and answered all questions. Vitals stable and pain is well controlled. Patient is up to the bathroom and tolerating it well. All medications given without complications. No other issues this shift.

## 2023-04-25 NOTE — DISCHARGE SUMMARY
Physician Discharge Summary  Patient Identification:  Name: Mariela Polanco  Age: 57 y.o.  Sex: female  :  1965  MRN: [id number]    Admit date: 2023    Discharge date and time: 2023    Admitting Physician: Miladis Mccormack MD     Discharge Physician: Miladis Mccormack MD    Admission Diagnoses: Ductal carcinoma in situ (DCIS) of right breast [D05.11]    Discharge Diagnoses: Ductal carcinoma in situ (DCIS) of right breast [D05.11]    Discharged Condition: good    Admission HPI:    Procedures:  Procedure(s) (LRB):  bilateral breast skin sparing mastectomy with right sentinel lymph node biopsy. (Right)  BREAST RECONSTRUCTION, bilateral breast reconstruction with implant, mesh, use of spy (Bilateral)    Hospital Course:   The pt was brought to the OR on 2023 for the above procedure. Patient  tolerated the procedure well, see dictated operative note for detailed summary. At the time of discharge patient was tolerating a regular diet and having bowel movements. Also, at the time of discharge  pain was controlled on oral medication and patient was ambulating without assistance.     Consults:   None    Significant Diagnostic Studies: labs and radiology    Discharge Exam:  GEN: no acute distress, resting quietly   HEENT: NCAT, EOMI, MMM   HEART: normal rate, regular rhythm   LUNGS: respirations non-labored   ABD: soft, nondistended, nontender   EXT: moves all extremities, no edema    Disposition:  Home    Patient Instructions:   [unfilled]   Follow-up Information     Kuldeep Robles MD .    Specialty: Internal Medicine  Contact information:  [address] [place], [place] [id number] [phone]                             Ductal carcinoma in situ (DCIS) of right breast      Signed:  Maryse Del Valle MD, PhD  Plastic and reconstructive surgery   2023

## 2023-04-25 NOTE — OP NOTE
Plastic Surgery Op Note    BILATERAL IMMEDIATE BREAST RECONSTRUCTION WITH PRE PECTORAL PLACEMENT OF IMPLANT AND MESH, USE OF SPY.     Mariela LOPEZ Collin  4/24/2023    Pre-op Diagnosis:   Ductal carcinoma in situ (DCIS) of right breast [D05.11]    Post-Op Diagnosis Codes:     * Ductal carcinoma in situ (DCIS) of right breast [D05.11]  Disproportion between native and reconstructed breast     Anesthesia: General    Staff:   Circulator: Leena Camacho, RN; Nel Mathews, PHANI; Mary Jane Fatima RN  Scrub Person: Delaney Mazariegos; Isaiah Cabrera  Assistant: Andrew Hung CSA    Surgeon: Dr Wilkinson    Estimated Blood Loss: minimal    Specimens:   Order Name Source Comment Collection Info Order Time   TISSUE PATHOLOGY EXAM Breast, Left  Collected By: Miladis Mccormack MD 4/24/2023  7:11 PM     Release to patient   Routine Release              Drains:   Closed/Suction Drain Inferior;Lateral;Right Breast Bulb 15 Fr. (Active)       Closed/Suction Drain Inferior;Lateral;Left Breast Bulb 15 Fr. (Active)       [REMOVED] Urethral Catheter Silicone 16 Fr. (Removed)       Findings:     Indocyanine dye demonstrated minimal fat hypoperfusion that was excised.      Implants:      Right:   Smooth round gel implant      Alloderm select perforated rectangular 16x20 cm   Left:  Smooth round gel implant SC  650   Alloderm select perforated rectangular 16x20 cm [id number]    Mesh placed below the implant as well.     Complications: None        Date: 4/24/2023  Time: 21:25 EDT  After risks and benefits were discussed with patient and informed consent was signed, patient was taken to the OR and positioned supine. The surgical site was then prepped in a sterile fashion way and a time out was performed confirming patient's name and procedure to be performed. All surgical team was introduced by name.   The planned footprint of the reconstructed breast was marked on the chest wall. The location of the inframammary suture line was marked  approximately 0.5 cm below that of the planned inframammary fold location on the reconstructed breast. A [id number] sheet of acellular dermal matrix (AlloDerm) was then placed in the breast pocket, and a 2-0 interrupted  PDS suture line was first placed horizontally between acellular dermal matrix and the underlying pectoralis muscle fibers, approximately 3 cm above the planned inframammary fold. The acellular dermal matrix was then pulled down and folded at the inframammary fold where it came  up and over the lower pole of the implant, off the chest wall.  At this point, a sizer was placed in the breast and indocyanine dye injected. The skin perfusion was checked. The sizer was removed, the pocket irrigated with saline and irrisept and an implant was placed. The remainder of acellular dermal matrix was pulled up and over the entire anterior surface of the implant . Intercostal block was performed and one 15 F Reed drain was then placed. The skin was  closed with 3-0 Vicryl followed by running 3-0 vicryl. A mirror procedure was performed in the contralateral side.   Patient tolerated procedure well, there were no complications, all instruments were checked and patient was awakened and taken to PACU in stable condition.  I was present for the entire procedure.     This procedure was not performed to treat cancer            Maryse Wilkinson MD  04/24/23  21:25 EDT

## 2023-04-25 NOTE — PROGRESS NOTES
General Surgery     No acute events overnight. Pain controlled. Took no pain medication. Up and ambulating.      AFVSS   No increased work of breathing   No tachycardia   Dressing clean and dry    Plan:   Home today   PRN pain and nausea medication   Will call with final pathology results   Follow up 3-4 weeks    Miladis Mccormack MD

## 2023-04-25 NOTE — DISCHARGE INSTRUCTIONS
Ok for regular diet  Do not drive for next 2 weeks  Ok to shower in 3 days but be aware you are a fall risk so have someone with you or place a chair in the shower. It is ok to wet the breast. Wash the drain site with water and liquid soap everyday. No sponge or cloths. Wash the drain site before other parts of the body  Strip drains q 8 hours and record drain output daily. Wash hands or wear gloves when manipulating drains.  Do not exercise for the next 6 weeks.  Sleep in an upright position  No bra for 1 week. After that, wear a comfortable soft bra  Ok to move arms slowly to the shoulder level (brushing hair movement)  Do not fly for 2 months    Take post-operative medications as directed on the bottle.     If you experience any issues or concerns, please contact the office at [phone]. If after hours a call service will notify the on-call provider.

## 2023-04-25 NOTE — ANESTHESIA POSTPROCEDURE EVALUATION
"Patient: Mariela Polanco    Procedure Summary     Date: 04/24/23 Room / Location: [facility] OR [address] [place] [address] MAIN OR    Anesthesia Start: 1823 Anesthesia Stop: 2130    Procedures:       bilateral breast skin sparing mastectomy with right sentinel lymph node biopsy. (Right: Breast)      BREAST RECONSTRUCTION, bilateral breast reconstruction with implant, mesh, use of spy (Bilateral: Breast) Diagnosis:       Ductal carcinoma in situ (DCIS) of right breast      (Ductal carcinoma in situ (DCIS) of right breast [D05.11])    Surgeons: Miladis Mccormack MD; Maryse Wilkinson MD Provider: Kush Grissom MD    Anesthesia Type: general ASA Status: 2          Anesthesia Type: general    Vitals  Vitals Value Taken Time   /91 04/24/23 2211   Temp 36.6 °C (97.9 °F) 04/24/23 2133   Pulse 78 04/24/23 2223   Resp 16 04/24/23 2133   SpO2 99 % 04/24/23 2223   Vitals shown include unvalidated device data.        Post Anesthesia Care and Evaluation    Patient location during evaluation: bedside  Patient participation: complete - patient participated  Level of consciousness: sleepy but conscious  Pain score: 0  Pain management: adequate    Airway patency: patent  Anesthetic complications: No anesthetic complications    Cardiovascular status: acceptable  Respiratory status: acceptable  Hydration status: acceptable    Comments: /73 (BP Location: Left arm, Patient Position: Lying)   Pulse 67   Temp 36.6 °C (97.9 °F) (Oral)   Resp 16   Ht 167.6 cm (66\")   SpO2 96%   BMI 26.47 kg/m²         "

## 2023-04-25 NOTE — PROGRESS NOTES
"Post-op Follow-up (1wk post op follow up )            History of Present Illness  Mariela Polanco is a 57 y.o. female who presents to [facility] PLASTIC & RECONSTRUCTIVE SURGERY as a post op follow up BREAST RECONSTRUCTION, bilateral breast reconstruction with implant, mesh, use of spy 02/24/2023    She is here for 1wk post op follow up, pain is 4/10 with Tylenol only, she has bilateral drains with output Left: 90/65/55 Right: 110/75/95, no concerns    Subjective      Penicillins  Allergies Reconciled.    Review of Systems   All review of system has been reviewed and it  is negative except the ones note above.     Objective     /64 (BP Location: Left arm)   Pulse 73   Temp 97.7 °F (36.5 °C) (Temporal)   Ht 167.6 cm (66\")   Wt 73.3 kg (161 lb 9.6 oz)   SpO2 97%   BMI 26.08 kg/m²     Body mass index is 26.08 kg/m².    Physical Exam  Incision c/d/i, left incision darker than the right.     Result Review :              Assessment and Plan      Diagnoses and all orders for this visit:    1. Ductal carcinoma in situ (DCIS) of right breast (Primary)    2. Disproportion between native breast and reconstructed breast        Additional Order(s):       Plan:  • Keep drain. Follow up in 1 week.    I spent 15 minutes caring for Mariela on this date of service. This time includes time spent by me in the following activities:performing a medically appropriate examination and/or evaluation , counseling and educating the patient/family/caregiver, documenting information in the medical record, and care coordination    Follow Up     No follow-ups on file.    Patient was given instructions and counseling regarding her condition. Please see specific information pulled into the AVS if appropriate.     Maryse Wilkinson MD  05/01/2023      "

## 2023-04-26 ENCOUNTER — TELEPHONE (OUTPATIENT)
Dept: PLASTIC SURGERY | Facility: CLINIC | Age: 58
End: 2023-04-26
Payer: COMMERCIAL

## 2023-04-26 LAB
LAB AP CASE REPORT: NORMAL
LAB AP DIAGNOSIS COMMENT: NORMAL
LAB AP SYNOPTIC CHECKLIST: NORMAL
PATH REPORT.FINAL DX SPEC: NORMAL
PATH REPORT.GROSS SPEC: NORMAL

## 2023-04-26 NOTE — TELEPHONE ENCOUNTER
called stating he thinks one of her drains is clogged, I advised him to strip it and he stated he has. I advised him that he will need to start at top and pull while stripping all way down to the bulb, it will be uncomfortable but sometimes that is what it takes to unclog the drain. I also advised him that we could see her in the office if needed tomorrow or Friday but it would be in [facility], we are only in [place] office Monday mornings. He states he will try stripping more & if he feels like she needs to be seen then he will call back.

## 2023-04-26 NOTE — CASE MANAGEMENT/SOCIAL WORK
Case Management Discharge Note      Final Note: Patient discharged home, no needs.         Selected Continued Care - Discharged on 4/25/2023 Admission date: 4/24/2023 - Discharge disposition: Home or Self Care    Destination    No services have been selected for the patient.              Durable Medical Equipment    No services have been selected for the patient.              Dialysis/Infusion    No services have been selected for the patient.              Home Medical Care    No services have been selected for the patient.              Therapy    No services have been selected for the patient.              Community Resources    No services have been selected for the patient.              Community & DME    No services have been selected for the patient.                       Final Discharge Disposition Code: 01 - home or self-care

## 2023-04-27 ENCOUNTER — OFFICE VISIT (OUTPATIENT)
Dept: PLASTIC SURGERY | Facility: CLINIC | Age: 58
End: 2023-04-27
Payer: COMMERCIAL

## 2023-04-27 VITALS
OXYGEN SATURATION: 93 % | BODY MASS INDEX: 26.07 KG/M2 | SYSTOLIC BLOOD PRESSURE: 125 MMHG | TEMPERATURE: 98.7 F | HEIGHT: 66 IN | DIASTOLIC BLOOD PRESSURE: 76 MMHG | HEART RATE: 65 BPM | WEIGHT: 162.2 LBS

## 2023-04-27 DIAGNOSIS — Z09 POSTOPERATIVE FOLLOW-UP: Primary | ICD-10-CM

## 2023-04-27 RX ORDER — CIPROFLOXACIN 500 MG/1
500 TABLET, FILM COATED ORAL
COMMUNITY
Start: 2022-11-10 | End: 2023-04-27

## 2023-04-27 RX ORDER — TESTOSTERONE GEL, 1% 10 MG/G
GEL TRANSDERMAL
COMMUNITY
Start: 2023-03-01

## 2023-04-27 RX ORDER — FERROUS FUMARATE/ASCORBIC ACID 65MG-25 MG
TABLET, EXTENDED RELEASE ORAL 3 TIMES DAILY
COMMUNITY

## 2023-04-27 NOTE — PROGRESS NOTES
"Chief Complaint  Post-op Follow-up (Drain concern)    Subjective          History of Present Illness  Mariela Polanco is a 57 y.o. female who presents to [facility] PLASTIC & RECONSTRUCTIVE SURGERY for Postoperative Follow-Up of BREAST RECONSTRUCTION, bilateral breast reconstruction with implant, mesh, use of spy 4/24/23.    Patient comes in today for a drain concern. Patient is having moderate drainage from left drain site. Patient's  states the drain seems to be clogged when striping the drain. Right drain: 145 CC's, 160CC's, 60CC's. Left: only has gotten 40 output. This didn't start happening until this morning. Pain level 5/10.         Allergies: Penicillins  Allergies Reconciled.    Review of Systems   All review of system has been reviewed and it  is negative except the ones note above.     Objective     /76 (BP Location: Left arm, Patient Position: Sitting, Cuff Size: Adult)   Pulse 65   Temp 98.7 °F (37.1 °C) (Temporal)   Ht 167.6 cm (65.98\")   Wt 73.6 kg (162 lb 3.2 oz)   SpO2 93%   BMI 26.19 kg/m²     Body mass index is 26.19 kg/m².    Physical Exam   Cardiovascular: Normal rate.     Pulmonary/Chest  Effort normal.      Breast  Incision healing well, expected swelling, no erythema, no drainage from incisions, bilateral drains intact with serosanguineous fluid, right darker than left      Result Review :                Assessment and Plan      Diagnoses and all orders for this visit:    1. Postoperative follow-up (Primary)        Plan:  • Left drain stripped and draining, drain was coming out but is still maintaining suction, hope to keep one week. Monitor drain output, rtc one week        Follow Up     No follow-ups on file.    Patient was given instructions and counseling regarding her condition. Please see specific information pulled into the AVS if appropriate.     Kim Floyd, APRN  04/27/2023  "

## 2023-04-28 ENCOUNTER — TELEPHONE (OUTPATIENT)
Dept: SURGERY | Facility: CLINIC | Age: 58
End: 2023-04-28
Payer: COMMERCIAL

## 2023-04-28 NOTE — TELEPHONE ENCOUNTER
Called Mariela Polanco regarding recent surgical pathology.    Left breast benign     Right breast DCIS (20cm), grade III, margins negative, ER+/AK+. 0/2 lymph nodes.   [id number]    Appointment Dr Castillo   Follow up in two weeks for wound check and further cancer surveillance planning.     Miladis Mccormack MD

## 2023-05-01 ENCOUNTER — OFFICE VISIT (OUTPATIENT)
Dept: PLASTIC SURGERY | Facility: CLINIC | Age: 58
End: 2023-05-01
Payer: COMMERCIAL

## 2023-05-01 ENCOUNTER — PATIENT OUTREACH (OUTPATIENT)
Dept: OTHER | Facility: HOSPITAL | Age: 58
End: 2023-05-01
Payer: COMMERCIAL

## 2023-05-01 VITALS
DIASTOLIC BLOOD PRESSURE: 64 MMHG | SYSTOLIC BLOOD PRESSURE: 124 MMHG | HEART RATE: 73 BPM | OXYGEN SATURATION: 97 % | BODY MASS INDEX: 25.97 KG/M2 | WEIGHT: 161.6 LBS | HEIGHT: 66 IN | TEMPERATURE: 97.7 F

## 2023-05-01 DIAGNOSIS — N65.1 DISPROPORTION BETWEEN NATIVE BREAST AND RECONSTRUCTED BREAST: ICD-10-CM

## 2023-05-01 DIAGNOSIS — D05.11 DUCTAL CARCINOMA IN SITU (DCIS) OF RIGHT BREAST: Primary | ICD-10-CM

## 2023-05-01 NOTE — PROGRESS NOTES
Called MsAntonio Mikesamuelneal to see how she was doing. Left a message with my contact information and asked her to call back at her convenience.

## 2023-05-03 NOTE — PROGRESS NOTES
"Post-op Follow-up (2wks post op and possible drain removal )            History of Present Illness  Mariela Polanco is a 57 y.o. female who presents to [facility] PLASTIC & RECONSTRUCTIVE SURGERY as a post op follow up BREAST RECONSTRUCTION, bilateral breast reconstruction with implant, mesh, use of spy 04/24/2023.      She is 2 week post op, she has bilateral drains with output Left: 45/35/30 Right: 78/75/70, pain is 5/10, she is taking her pain meds     Subjective      Penicillins  Allergies Reconciled.    Review of Systems   All review of system has been reviewed and it  is negative except the ones note above.     Objective     /62 (BP Location: Left arm)   Pulse 86   Temp 97.5 °F (36.4 °C) (Temporal)   Ht 167.6 cm (66\")   Wt 72.3 kg (159 lb 8 oz)   SpO2 98%   BMI 25.74 kg/m²     Body mass index is 25.74 kg/m².    Physical Exam  Breast: implants expected high. Left incision with mild ischemia at the suture line but still intact.   Right breast hematoma                               Result Review :              Assessment and Plan      Diagnoses and all orders for this visit:    1. Disproportion between native breast and reconstructed breast (Primary)    2. Ductal carcinoma in situ (DCIS) of right breast    Other orders  -     cyclobenzaprine (FLEXERIL) 5 MG tablet; Take 1 tablet by mouth 2 (Two) Times a Day As Needed for Muscle Spasms for up to 21 days.  Dispense: 10 tablet; Refill: 1        Additional Order(s):       Plan:  • Keep drains, follow up next week.       Patient was given instructions and counseling regarding her condition. Please see specific information pulled into the AVS if appropriate.     Maryse Wilkinson MD  05/08/2023      "

## 2023-05-05 ENCOUNTER — TELEPHONE (OUTPATIENT)
Dept: SURGERY | Facility: CLINIC | Age: 58
End: 2023-05-05
Payer: COMMERCIAL

## 2023-05-05 NOTE — TELEPHONE ENCOUNTER
Voicemail left informing patient that her [employer] 9 gene Breast Cancer Stat Panel Results are negative.

## 2023-05-07 NOTE — PROGRESS NOTES
General Surgery Breast Cancer History and Physical Exam     Summary:    Mariela Polanco is a 57 y.o. lady who presents with a history of right breast DCIS: Grade III,  ER+/WI+, Ki67 35%; qOvcT0S3, Stage 0.      A multidisciplinary plan has been formulated for the patient:    (1) Breast Surgical Oncology:  -[employer] 9 panel genetic testing: negative.   -s/p bilateral breast skin sparing mastectomies with R SLN biopsy 4/2023.   -Lymphedema clinic referral.  -Needs a repeat blood draw for genetic testing.  -Follow up in 1 year.     (2) Medical Oncology:  -Appointment with Dr. Castillo 5/9/2023.    Referring Provider: No ref. provider found    Chief Complaint: abnormal breast imaging    History of Present Illness: Ms. Mariela Polanco is a 57 y.o. year old lady, seen at the request of No ref. provider found for a new diagnosis of right breast cancer.      This was initially detected as an imaging abnormality. She has had annual mammograms each year. She denies any prior history of abnormal mammograms or breast biopsies. Her work-up is detailed in the oncologic history below.     She denies any breast lumps, pain, skin changes, or nipple discharge. She denies any family history of breast cancer. She believes her maternal aunt had ovarian cancer.      5/8/2023 She presents today for follow up. Her pain is controlled overall; she did injure her back yesterday and is having some soreness and discomfort. She is slowly getting back to her daily activities.    Workup of Current Diagnosis:    2/14/2023 Bilateral Screening Mammogram:  IMPRESSION:  Cluster of calcifications in the right breast. Magnification views recommended. The left breast remains negative.   BI-RADS 0: Needs additional evaluation.    3/20/2023 Right Breast Diagnostic Mammogram:   FINDINGS:  There are scattered areas of fibroglandular density. Today's study includes spot magnification views in the CC and MLO projections. There is a cluster of pleomorphic  calcifications in the central right breast. The cluster is  up to 9 mm maximum dimension. Otherwise breast is negative  IMPRESSION:  Suspicious cluster of calcifications central right breast.  BI-RADS CATEGORY 4: Suspicious abnormality. Stereotactic biopsy recommended    3/31/2023 Right Breast Stereotactic Biopsy:   TECHNIQUE: Timeout was performed. Informed consent was obtained. Patient placed in stereotactic device and a superior approach was chosen. After  images, sterile preparation local anesthesia was performed. A skin nick was made with a scalpel. Then the 9 gauge vacuum-assisted biopsy needle was introduced into the breast to the predetermined coordinates. Pre and posterior images were obtained. Around the clock sampling was performed. Specimen image showed numerous calcifications had been  removed. The needle was removed and a marker clip was placed.    Postprocedure mammogram showed the clip in good position adjacent to the remaining calcifications. Posterior specimen image showed numerous calcifications in well 3 6 and 12  IMPRESSION:  Successful stereotactic biopsy right breast with removal of many of the calcifications. Pathology report is pending    3/31/2023 Pathology:   Final Diagnosis   1. Breast, Right, Core Biopsy:                A. Ductal carcinoma in-situ (DCIS), high nuclear grade with solid and cribriform architecture, central comedo type                   necrosis and microcalcifications measuring up to 5 mm maximally and involving three core fragments.     4/14/2023 Bilateral Breast MRI   IMPRESSION:  1. Biopsy-proven malignancy in the right breast at the 12-o'clock position represented by the top hat-shaped metallic clip which is at the anterior margin of an irregular mass that measures 1.1 cm in greatest dimension. The mass corresponds to the residual microcalcifications seen on the post biopsy mammogram of 03/31/2023. No other suspicious findings are seen within the right breast and  there is no evidence for right axillary adenopathy.  2. There are no findings suspicious for malignancy in the left breast.  BI-RADS category 6: Known biopsy-proven malignancy.    Past Medical History:   • HTN   • GERD    Past Surgical History:    • C section   • Cholecystectomy   • EGD and colonoscopy    Family History:    • As above    Social History:  • Denies tobacco use  • Occasional alcohol use    Allergies:   Allergies   Allergen Reactions   • Penicillins Other (See Comments)     Childhood allergy     Medications:     Current Outpatient Medications:   •  amLODIPine (NORVASC) 5 MG tablet, Take 1 tablet by mouth Daily. (Patient taking differently: Take 1 tablet by mouth Every Night.), Disp: 90 tablet, Rfl: 1  •  buPROPion XL (WELLBUTRIN XL) 300 MG 24 hr tablet, Take 1 tablet by mouth Daily. (Patient taking differently: Take 1 tablet by mouth Every Night.), Disp: 90 tablet, Rfl: 1  •  Cholecalciferol 50 MCG (2000 UT) capsule, Take  by mouth., Disp: , Rfl:   •  clindamycin (Cleocin) 150 MG capsule, Take 1 capsule by mouth 3 (Three) Times a Day., Disp: 30 capsule, Rfl: 0  •  Estriol 10 % cream, , Disp: , Rfl:   •  Ferrous Fumarate-Vitamin C ER (Heather-Sequels) 65-25 MG CR tablet, Take  by mouth 3 (Three) Times a Day., Disp: , Rfl:   •  NON FORMULARY, Take 1 tablet by mouth 2 (Two) Times a Day. LeuSyngery  .  PT HOLDING FOR SURGERY, Disp: , Rfl:   •  NON FORMULARY, Take 1 tablet by mouth Every Night. Regenamultiultra.  HOLD FOR SURGERY, Disp: , Rfl:   •  ondansetron (Zofran) 4 MG tablet, Take 1 tablet by mouth Daily As Needed for Nausea or Vomiting., Disp: 20 tablet, Rfl: 0  •  oxyCODONE-acetaminophen (Percocet) 5-325 MG per tablet, Take 1-2 tablets by mouth Every 6 (Six) Hours As Needed for Moderate Pain., Disp: 28 tablet, Rfl: 0  •  pantoprazole (PROTONIX) 40 MG EC tablet, Take 1 tablet by mouth Daily. (Patient taking differently: Take 1 tablet by mouth Every Night.), Disp: 90 tablet, Rfl: 3  •  testosterone  (ANDROGEL) 50 MG/5GM (1%) gel gel, , Disp: , Rfl:   •  traZODone (DESYREL) 50 MG tablet, Take 1 tablet by mouth Every Night., Disp: 90 tablet, Rfl: 1  •  Wegovy 0.5 MG/0.5ML solution auto-injector, 1 (One) Time Per Week. , Disp: , Rfl:     Laboratory Values:    Labs from 2023 reviewed    Review of Systems:   Influenza-like illness: no fever, no  cough, no  sore throat, no  body aches, no loss of sense of taste or smell, no known exposure to person with Covid-19.  Constitutional: Negative for fevers or chills  HENT: Negative for hearing loss or runny nose  Eyes: Negative for vision changes or scleral icterus  Respiratory: Negative for cough or shortness of breath  Cardiovascular: Negative for chest pain or heart palpitations  Gastrointestinal: Negative for abdominal pain, nausea, vomiting, constipation, melena, or hematochezia  Genitourinary: Negative for hematuria or dysuria  Musculoskeletal: Negative for joint swelling or gait instability  Neurologic: Negative for tremors or seizures  Psychiatric: Negative for suicidal ideations or depression  All other systems reviewed and negative    Physical Exam:   • ECO - Asymptomatic  • Constitutional: Well-developed well-nourished, no acute distress  • Eyes: Conjunctiva normal, sclera nonicteric  • ENMT: Hearing grossly normal, oral mucosa moist  • Neck: Supple, no palpable mass, trachea midline  • Respiratory: Clear to auscultation, normal inspiratory effort  • Cardiovascular: Regular rate, no peripheral edema, no jugular venous distention  • Breast: symmetric  o Bilateral breast incisions clean and dry, healing well, no erythema or drainage, JAN drains serosanguinous   o No clinical chest wall involvement.  • Gastrointestinal: Soft, nontender  • Lymphatics (palpable nodes): No cervical, supraclavicular or axillary lymphadenopathy  • Skin:  Warm, dry, no rash on visualized skin surfaces  • Musculoskeletal: Symmetric strength, normal gait  • Psychiatric: Alert  and oriented ×3, normal affect     HUSSAIN ROBINS M.D.  General and Endoscopic Surgery  [facility]    [address], [address]  [place], [place], [address]  P: [phone]  F: [phone]

## 2023-05-08 ENCOUNTER — OFFICE VISIT (OUTPATIENT)
Dept: SURGERY | Facility: CLINIC | Age: 58
End: 2023-05-08
Payer: COMMERCIAL

## 2023-05-08 ENCOUNTER — OFFICE VISIT (OUTPATIENT)
Dept: PLASTIC SURGERY | Facility: CLINIC | Age: 58
End: 2023-05-08
Payer: COMMERCIAL

## 2023-05-08 VITALS
BODY MASS INDEX: 25.63 KG/M2 | OXYGEN SATURATION: 98 % | TEMPERATURE: 97.5 F | DIASTOLIC BLOOD PRESSURE: 62 MMHG | WEIGHT: 159.5 LBS | SYSTOLIC BLOOD PRESSURE: 118 MMHG | HEIGHT: 66 IN | HEART RATE: 86 BPM

## 2023-05-08 DIAGNOSIS — D05.11 DUCTAL CARCINOMA IN SITU (DCIS) OF RIGHT BREAST: ICD-10-CM

## 2023-05-08 DIAGNOSIS — D05.10 DUCTAL CARCINOMA IN SITU (DCIS) OF BREAST, UNSPECIFIED LATERALITY: Primary | ICD-10-CM

## 2023-05-08 DIAGNOSIS — N65.1 DISPROPORTION BETWEEN NATIVE BREAST AND RECONSTRUCTED BREAST: Primary | ICD-10-CM

## 2023-05-08 PROCEDURE — 99024 POSTOP FOLLOW-UP VISIT: CPT | Performed by: STUDENT IN AN ORGANIZED HEALTH CARE EDUCATION/TRAINING PROGRAM

## 2023-05-08 RX ORDER — CYCLOBENZAPRINE HCL 5 MG
5 TABLET ORAL 2 TIMES DAILY PRN
Qty: 10 TABLET | Refills: 1 | Status: SHIPPED | OUTPATIENT
Start: 2023-05-08 | End: 2023-05-29

## 2023-05-08 NOTE — PROGRESS NOTES
"Post-op Follow-up (3wk post op follow up ? Drain pull )            History of Present Illness  Mariela Polanco is a 57 y.o. female who presents to [facility] as a post op follow up BREAST RECONSTRUCTION, bilateral breast reconstruction with implant, mesh, use of spy 04/24/2023.      She is 3wks post op and here for possible drain pull, she still has both drains with output Left: 30/20/20 Right: 40/40/40      Subjective      Penicillins  Allergies Reconciled.    Review of Systems   All review of system has been reviewed and it  is negative except the ones note above.     Objective     /60 (BP Location: Left arm)   Pulse 91   Temp 97.4 °F (36.3 °C) (Temporal)   Ht 167.6 cm (66\")   Wt 71.2 kg (156 lb 14.4 oz)   SpO2 95%   BMI 25.32 kg/m²     Body mass index is 25.32 kg/m².    Physical Exam  Breast: incisions c/d/i, right breast hematoma better                           Result Review :              Assessment and Plan      Diagnoses and all orders for this visit:    1. Ductal carcinoma in situ (DCIS) of right breast (Primary)    2. Disproportion between native breast and reconstructed breast        Additional Order(s):       Plan:   Left drain removed today. I will keep right drain to be removed later this week or next week with me.       Patient was given instructions and counseling regarding her condition. Please see specific information pulled into the AVS if appropriate.     Maryse Wilkinson MD  05/15/2023      "

## 2023-05-09 ENCOUNTER — CONSULT (OUTPATIENT)
Dept: ONCOLOGY | Facility: CLINIC | Age: 58
End: 2023-05-09
Payer: COMMERCIAL

## 2023-05-09 ENCOUNTER — LAB (OUTPATIENT)
Dept: LAB | Facility: HOSPITAL | Age: 58
End: 2023-05-09
Payer: COMMERCIAL

## 2023-05-09 VITALS
HEART RATE: 80 BPM | DIASTOLIC BLOOD PRESSURE: 77 MMHG | RESPIRATION RATE: 18 BRPM | WEIGHT: 160 LBS | SYSTOLIC BLOOD PRESSURE: 118 MMHG | TEMPERATURE: 98.4 F | OXYGEN SATURATION: 97 % | BODY MASS INDEX: 25.71 KG/M2 | HEIGHT: 66 IN

## 2023-05-09 DIAGNOSIS — D05.11 DUCTAL CARCINOMA IN SITU (DCIS) OF RIGHT BREAST: ICD-10-CM

## 2023-05-09 DIAGNOSIS — D05.11 DUCTAL CARCINOMA IN SITU (DCIS) OF RIGHT BREAST: Primary | ICD-10-CM

## 2023-05-09 DIAGNOSIS — C50.919 MALIGNANT NEOPLASM OF FEMALE BREAST, UNSPECIFIED ESTROGEN RECEPTOR STATUS, UNSPECIFIED LATERALITY, UNSPECIFIED SITE OF BREAST: Primary | ICD-10-CM

## 2023-05-09 LAB
BASOPHILS # BLD AUTO: 0.07 10*3/MM3 (ref 0–0.2)
BASOPHILS NFR BLD AUTO: 0.6 % (ref 0–1.5)
DEPRECATED RDW RBC AUTO: 45.1 FL (ref 37–54)
EOSINOPHIL # BLD AUTO: 0.08 10*3/MM3 (ref 0–0.4)
EOSINOPHIL NFR BLD AUTO: 0.7 % (ref 0.3–6.2)
ERYTHROCYTE [DISTWIDTH] IN BLOOD BY AUTOMATED COUNT: 13.4 % (ref 12.3–15.4)
HCT VFR BLD AUTO: 36.3 % (ref 34–46.6)
HGB BLD-MCNC: 12.5 G/DL (ref 12–15.9)
IMM GRANULOCYTES # BLD AUTO: 0.08 10*3/MM3 (ref 0–0.05)
IMM GRANULOCYTES NFR BLD AUTO: 0.7 % (ref 0–0.5)
LYMPHOCYTES # BLD AUTO: 2.78 10*3/MM3 (ref 0.7–3.1)
LYMPHOCYTES NFR BLD AUTO: 23.2 % (ref 19.6–45.3)
MCH RBC QN AUTO: 32.1 PG (ref 26.6–33)
MCHC RBC AUTO-ENTMCNC: 34.4 G/DL (ref 31.5–35.7)
MCV RBC AUTO: 93.3 FL (ref 79–97)
MONOCYTES # BLD AUTO: 0.9 10*3/MM3 (ref 0.1–0.9)
MONOCYTES NFR BLD AUTO: 7.5 % (ref 5–12)
NEUTROPHILS NFR BLD AUTO: 67.3 % (ref 42.7–76)
NEUTROPHILS NFR BLD AUTO: 8.09 10*3/MM3 (ref 1.7–7)
NRBC BLD AUTO-RTO: 0 /100 WBC (ref 0–0.2)
PLATELET # BLD AUTO: 324 10*3/MM3 (ref 140–450)
PMV BLD AUTO: 8.8 FL (ref 6–12)
RBC # BLD AUTO: 3.89 10*6/MM3 (ref 3.77–5.28)
WBC NRBC COR # BLD: 12 10*3/MM3 (ref 3.4–10.8)

## 2023-05-09 PROCEDURE — 36415 COLL VENOUS BLD VENIPUNCTURE: CPT

## 2023-05-09 PROCEDURE — 85025 COMPLETE CBC W/AUTO DIFF WBC: CPT

## 2023-05-09 NOTE — PROGRESS NOTES
Subjective   Mariela Polanco is a 57 y.o. female.  Referred by Dr. Robles for right breast ductal carcinoma in situ    History of Present Illness     Patient is a 57-year-old  lady who presented with a screen detected abnormality of the right breast on 2/14/2023.  Cluster of calcifications are seen at 12 o'clock position of the right breast in the middle third that is markedly increased from the prior study.  Magnification views recommended.    3/20/2023-right breast diagnostic mammogram  Suspicious cluster of calcifications in the right breast.  The cluster is up to 9 mm in maximum dimension.  Stereotactic biopsy recommended.    3/31/2023-right breast ear tactic biopsy-pathology consistent with ductal carcinoma in situ, high-grade  ER +% strong  CO +51-60% strong  Ki-67 35%    4/11/2023-genetic testing with [employer] 9 gene stat panel negative    4/14/2023-bilateral breast MRI  1.biopsy-proven malignancy in the right breast at 12:00 which is seen as an irregular mass measuring 1.1 cm in greatest dimension.  The mass corresponds to the residual microcalcifications seen on the postbiopsy mammogram.  No other suspicious findings seen in the right breast.  No evidence of right axillary lymphadenopathy  No suspicious findings of malignancy in the left breast.    4/24/2023-left breast skin sparing mastectomy  Benign breast tissue with fibrocystic change, clustered apocrine cysts and ductal hyperplasia of usual type.  Unremarkable skin and nipple  No atypical hyperplasia, in situ or invasive carcinoma.    Right breast-simple skin sparing mastectomy  High-grade ductal carcinoma in situ at 12:00  DCIS measures 20 mm  Margins are uninvolved by DCIS  2 sentinel lymph nodes negative for metastatic carcinoma  pTis N0 M0  ER +%  CO +51-60%  Ki-67 35%    1 drain present on each side.  She is recovering well from surgery however she pulled her back muscle and having some back pain.    She presents to the  clinic to discuss pathology and adjuvant therapy recommendations.  She is accompanied by her .    Comorbidities include hypertension for which she is currently on Norvasc 5 mg daily.      The following portions of the patient's history were reviewed and updated as appropriate: allergies, current medications, past family history, past medical history, past social history, past surgical history and problem list.    Past Medical History:   Diagnosis Date   • Adrenal adenoma    • Anxiety    • Cholelithiasis     Gall bladder removed   • DCIS (ductal carcinoma in situ)    • Diverticulitis of colon    • History of diverticulitis    • Hypertension    • Insomnia    • Iron deficiency anemia 2019   • Menopausal symptoms    • Pneumonia    • Rosacea    • Steatosis, liver    • Vitamin D deficiency         Past Surgical History:   Procedure Laterality Date   • BREAST BIOPSY Right    • BREAST RECONSTRUCTION Bilateral 2023    Procedure: BREAST RECONSTRUCTION, bilateral breast reconstruction with implant, mesh, use of spy;  Surgeon: Maryse Wilkinson MD;  Location: [facility];  Service: Plastics;  Laterality: Bilateral;   •  SECTION     • CHOLECYSTECTOMY     • COLONOSCOPY  10/28/2016    nl with Dr. Everett Botello   • COLONOSCOPY N/A 2023    Procedure: COLONOSCOPY FOR SCREENING;  Surgeon: Dakota Nowak MD;  [place] [facility] [place] [facility];  Service: Gastroenterology;  Laterality: N/A;  Diverticulosis, Hemorrhoids   • ENDOSCOPY N/A 05/10/2021    Procedure: ESOPHAGOGASTRODUODENOSCOPY WITH BIOPSY;  Surgeon: Dakota Nowak MD;  Location: [facility] [place] [facility];  Service: Gastroenterology;  Laterality: N/A;  GASTRIC POLYPS, SMALL HIATAL HERNIA, ESOPHAGITIS   • MASTECTOMY W/ SENTINEL NODE BIOPSY Right 2023    Procedure: bilateral breast skin sparing mastectomy with right sentinel lymph node biopsy.;  Surgeon: Miladis Mccormack MD;  Location: [facility];  Service: General;   Laterality: Right;   • UPPER GASTROINTESTINAL ENDOSCOPY          Family History   Problem Relation Age of Onset   • Other Mother         ESRD (end stage renal disease)   • Heart failure Mother         chronic congestive heart failure   • Diabetes Mother    • Hyperlipidemia Mother    • Hypertension Mother    • Pneumonia Mother    • Skin cancer Mother    • Diverticulitis Father    • Skin cancer Father    • Colon polyps Father    • Asthma Father    • Diabetes Sister    • Atrial fibrillation Sister    • Diabetes Brother    • Cancer Maternal Aunt         possibly ovarian   • Crohn's disease Other    • Breast cancer Neg Hx    • Colon cancer Neg Hx    • Irritable bowel syndrome Neg Hx    • Ulcerative colitis Neg Hx    • Malig Hyperthermia Neg Hx         Social History     Socioeconomic History   • Marital status:    Tobacco Use   • Smoking status: Never     Passive exposure: Never   • Smokeless tobacco: Never   Vaping Use   • Vaping Use: Never used   Substance and Sexual Activity   • Alcohol use: Yes     Alcohol/week: 3.0 standard drinks     Types: 3 Glasses of wine per week     Comment: social   • Drug use: Never   • Sexual activity: Yes     Partners: Male     Birth control/protection: Post-menopausal, Tubal ligation        OB History        2    Para   2    Term   2            AB        Living           SAB        IAB        Ectopic        Molar        Multiple        Live Births                 Age at menarche-14  Age at first live childbirth-37   3 para 2  1  Age at menopause-53  Use of Estrace cream intermittently for the past 2 years which she stopped at the time of diagnosis of breast cancer.  She has also recently been started on testosterone which she was on for less than 2 months.  She discontinued this at the time of diagnosis.    Allergies   Allergen Reactions   • Penicillins Other (See Comments)     Childhood allergy            Review of Systems   Constitutional: Negative.     HENT: Negative.    Eyes: Negative.    Respiratory: Negative.    Cardiovascular: Negative.    Gastrointestinal: Negative.    Endocrine: Negative.    Genitourinary: Negative.    Musculoskeletal: Negative.    Allergic/Immunologic: Negative.    Neurological: Negative.    Psychiatric/Behavioral: Negative.          Objective   not currently breastfeeding.   Physical Exam  Vitals reviewed.   Constitutional:       Appearance: Normal appearance. She is normal weight.   HENT:      Right Ear: External ear normal.      Left Ear: External ear normal.      Nose: Nose normal.      Mouth/Throat:      Pharynx: Oropharynx is clear.   Eyes:      Conjunctiva/sclera: Conjunctivae normal.      Pupils: Pupils are equal, round, and reactive to light.   Cardiovascular:      Rate and Rhythm: Normal rate.      Pulses: Normal pulses.   Pulmonary:      Effort: Pulmonary effort is normal.   Abdominal:      General: Abdomen is flat.   Musculoskeletal:         General: Normal range of motion.      Cervical back: Normal range of motion.   Skin:     General: Skin is warm.   Neurological:      Mental Status: She is alert. Mental status is at baseline.   Psychiatric:         Mood and Affect: Mood normal.         Behavior: Behavior normal.         Thought Content: Thought content normal.         Judgment: Judgment normal.       Breast Exam: Status post bilateral mastectomy with reconstruction.  Implants in place.  Drains present on both sides.  Drain sites appear normal.  Incisions healing well.    Admission on 04/24/2023, Discharged on 04/25/2023   Component Date Value Ref Range Status   • Case Report 04/24/2023    Final                    Value:Surgical Pathology Report                         Case: [id number]                                  Authorizing Provider:  Miladis Mccormack MD      Collected:           04/24/2023 07:09 PM          Ordering Location:     [facility]  Received:            04/24/2023 10:51 PM                                  MAIN OR                                                                      Pathologist:           Corry Yu MD                                                    Specimens:   1) - Breast, Left, LEFT BREAST, STITCH AT 12 O CLOCK, FRESH FOR PERMANENT, 840 GRAMS                2) - Breast, Right, RIGHT BREAST, STITCH AT 12 O CLOCK, FRESH FOR PERMANEN, 794 GRAMS               3) - [place] Lymph Node, RIGHT SENTINEL LYMPH NODE #1 HOT BLUE 3100                                4) - [place] Lymph Node, RIGHT SENTINEL LYMPH NODE #2 HOT 40                                       5) - Breast, Right, RIGHT BREAST SKIN INFERIOR. STAPLE INFERIOR                                                               6) - Breast, Left, LEFT BREAST SKIN INFERIOR. STAPLE INFERIOR                             • Final Diagnosis 04/24/2023    Final                    Value:This result contains rich text formatting which cannot be displayed here.   • Synoptic Checklist 04/24/2023    Final                    Value:DCIS OF THE BREAST: Resection                            DCIS OF THE BREAST: COMPLETE EXCISION - All Specimens                            8th Edition - Protocol posted: 3/23/2022                                                        SPECIMEN                               Procedure:    Total mastectomy                                Specimen Laterality:    Right                                                         TUMOR                               Tumor Site:    Clock position                                :    12 o'clock                              Histologic Type:    Ductal carcinoma in situ                              Size (Extent) of DCIS:    Estimated size (extent) of DCIS is at least (Millimeters): 20 mm                             Architectural Patterns:    [place]                              Architectural Patterns:    Solid                              Nuclear Grade:    Grade III (high)            Detail Level: Detailed "                   Necrosis:    Present, central (expansive \"comedo\" necrosis)                              Microcalcifications:    Present in DCIS                              Microcalcifications:    Present in nonneoplastic tissue                                                         MARGINS                             Margin Status:    All margins negative for DCIS                                Distance from DCIS to Closest Margin:    13 mm                               Closest Margin(s) to DCIS:    Anterior                                Distance from DCIS to Anterior Margin:    13 mm                               Distance from DCIS to Posterior Margin:    20 mm                               Distance from DCIS to Superior Margin:    90 mm                               Distance from DCIS to Inferior Margin:    100 mm                               Distance from DCIS to Medial Margin:    110 mm                               Distance from DCIS to Lateral Margin:    80 mm                                                        REGIONAL LYMPH NODES                             Regional Lymph Node Status:    Not applicable (no regional lymph nodes submitted or found)                                                         PATHOLOGIC STAGE CLASSIFICATION (pTNM, AJCC 8th Edition)                               Reporting of pT, pN, and (when applicable) pM categories is based on information available to the pathologist at the time the report is issued. As per the AJCC (Chapter 1, 8th Ed.) it is the managing physician’s responsibility to establish the final pathologic stage based upon all pertinent information, including but potentially not limited to this pathology report.                             pT Category:    pTis (DCIS)                              Regional Lymph Nodes Modifier:    (sn): Custar node(s) evaluated                              pN Category:    pN0      • Comment 04/24/2023    Final                   " Detail Level: Zone  Value:This result contains rich text formatting which cannot be displayed here.   • Gross Description 04/24/2023    Final                    Value:This result contains rich text formatting which cannot be displayed here.   Pre-Admission Testing on 04/19/2023   Component Date Value Ref Range Status   • Glucose 04/19/2023 95  65 - 99 mg/dL Final   • BUN 04/19/2023 11  6 - 20 mg/dL Final   • Creatinine 04/19/2023 0.85  0.57 - 1.00 mg/dL Final   • Sodium 04/19/2023 139  136 - 145 mmol/L Final   • Potassium 04/19/2023 4.7  3.5 - 5.2 mmol/L Final   • Chloride 04/19/2023 103  98 - 107 mmol/L Final   • CO2 04/19/2023 25.0  22.0 - 29.0 mmol/L Final   • Calcium 04/19/2023 9.7  8.6 - 10.5 mg/dL Final   • BUN/Creatinine Ratio 04/19/2023 12.9  7.0 - 25.0 Final   • Anion Gap 04/19/2023 11.0  5.0 - 15.0 mmol/L Final   • eGFR 04/19/2023 80.5  >60.0 mL/min/1.73 Final   • WBC 04/19/2023 8.02  3.40 - 10.80 10*3/mm3 Final   • RBC 04/19/2023 4.54  3.77 - 5.28 10*6/mm3 Final   • Hemoglobin 04/19/2023 14.2  12.0 - 15.9 g/dL Final   • Hematocrit 04/19/2023 40.3  34.0 - 46.6 % Final   • MCV 04/19/2023 88.8  79.0 - 97.0 fL Final   • MCH 04/19/2023 31.3  26.6 - 33.0 pg Final   • MCHC 04/19/2023 35.2  31.5 - 35.7 g/dL Final   • RDW 04/19/2023 12.1 (L)  12.3 - 15.4 % Final   • RDW-SD 04/19/2023 39.9  37.0 - 54.0 fl Final   • MPV 04/19/2023 8.9  6.0 - 12.0 fL Final   • Platelets 04/19/2023 226  140 - 450 10*3/mm3 Final   Office Visit on 04/17/2023   Component Date Value Ref Range Status   • Cotinine 04/17/2023 Negative  Wzpvmz=188 ng/mL Final   • Drug Screen Comment: 04/17/2023 Comment   Final    This analysis is performed by immunoassay. Positive findings are  unconfirmed analytical test results; if results do not support  expected clinical finding, confirmation by an alternate methodology is  recommended. Patient metabolic variables, specific drug chemistry, and  specimen characteristics can affect test outcome.  Technical consultation is  available at [email or web address], or call  toll free [phone].   Hospital Outpatient Visit on 04/14/2023   Component Date Value Ref Range Status   • Creatinine 04/14/2023 0.80  0.60 - 1.30 mg/dL Final    Serial Number: [id number]:  [id number]        MRI Breast Bilateral With & Without Contrast    Result Date: 4/14/2023  1. Biopsy-proven malignancy in the right breast at the 12-o'clock position represented by the top hat-shaped metallic clip which is at the anterior margin of an irregular mass that measures 1.1 cm in greatest dimension. The mass corresponds to the residual microcalcifications seen on the post biopsy mammogram of 03/31/2023. No other suspicious findings are seen within the right breast and there is no evidence for right axillary adenopathy. 2. There are no findings suspicious for malignancy in the left breast.  BI-RADS category 6: Known biopsy-proven malignancy.  This report was finalized on 4/14/2023 4:37 PM by Dr. Bertin Milian M.D.           Assessment & Plan       *Right breast ductal carcinoma in situ  · pTis N0 M0  · Stage 0  · ER +91 to 100%, IL +51 to 60%  · Status post bilateral mastectomy with right sentinel lymph node biopsy.  · Since she has had a bilateral mastectomy there is no indication for adjuvant endocrine therapy as the risk of recurrence is fairly low.  · We discussed the specifics of pathology including the ER/IL positivity.  · She was previously on Estrace and testosterone.  · She has discontinued these since the diagnosis.  · Could use vaginal estrogens if necessary.    *Hypertension-continue Norvasc 5 mg daily    *Anxiety/depression-continue Wellbutrin 300 mg    *Genetic testing-to be redrawn today.  She has a aunt who had ovarian cancer in her 50s.  No other family history of breast or ovarian cancer.    *Follow-up-6 months

## 2023-05-10 ENCOUNTER — PATIENT ROUNDING (BHMG ONLY) (OUTPATIENT)
Dept: ONCOLOGY | Facility: CLINIC | Age: 58
End: 2023-05-10
Payer: COMMERCIAL

## 2023-05-10 NOTE — PROGRESS NOTES
A My-Chart message has been sent to the patient for PATIENT ROUNDING with [facility] [place].

## 2023-05-15 ENCOUNTER — OFFICE VISIT (OUTPATIENT)
Dept: PLASTIC SURGERY | Facility: CLINIC | Age: 58
End: 2023-05-15
Payer: COMMERCIAL

## 2023-05-15 VITALS
HEART RATE: 91 BPM | DIASTOLIC BLOOD PRESSURE: 60 MMHG | SYSTOLIC BLOOD PRESSURE: 118 MMHG | HEIGHT: 66 IN | BODY MASS INDEX: 25.22 KG/M2 | WEIGHT: 156.9 LBS | OXYGEN SATURATION: 95 % | TEMPERATURE: 97.4 F

## 2023-05-15 DIAGNOSIS — D05.11 DUCTAL CARCINOMA IN SITU (DCIS) OF RIGHT BREAST: Primary | ICD-10-CM

## 2023-05-15 DIAGNOSIS — N65.1 DISPROPORTION BETWEEN NATIVE BREAST AND RECONSTRUCTED BREAST: ICD-10-CM

## 2023-05-15 NOTE — PROGRESS NOTES
"Post-op Follow-up (1m post op follow up )            History of Present Illness  Mariela Polanco is a 57 y.o. female who presents to [facility] as a post op follow up BREAST RECONSTRUCTION, bilateral breast reconstruction with implant, mesh, use of spy 04/24/2023.      She is here for 1m post op follow up and possilbe drain removal, she has 1 drain and output has been 35/30/35    Subjective      Penicillins  Allergies Reconciled.    Review of Systems   All review of system has been reviewed and it  is negative except the ones note above.     Objective     /78 (BP Location: Left arm)   Pulse 78   Temp 97.7 °F (36.5 °C) (Temporal)   Ht 167.6 cm (66\")   Wt 71.4 kg (157 lb 8 oz)   SpO2 98%   BMI 25.42 kg/m²     Body mass index is 25.42 kg/m².    Physical Exam  Breast: doing well, left breast with an area of 5 cm of erythema                     Result Review :              Assessment and Plan      Diagnoses and all orders for this visit:    1. Disproportion between native breast and reconstructed breast (Primary)    2. Ductal carcinoma in situ (DCIS) of right breast        Additional Order(s):       Plan:   Drain removed. On the left breast, 10 cc of serous fluid was aspirated. Patient to wear a bra and place betadine to the left incision bid.        Patient was given instructions and counseling regarding her condition. Please see specific information pulled into the AVS if appropriate.     Maryse Wilkinson MD  05/22/2023      "

## 2023-05-16 ENCOUNTER — DOCUMENTATION (OUTPATIENT)
Dept: OTHER | Facility: HOSPITAL | Age: 58
End: 2023-05-16
Payer: COMMERCIAL

## 2023-05-16 NOTE — PROGRESS NOTES
Oncology Social Work  ..Distress Screening Follow-up    Diagnosis: DCIS of Right Breast, stage 0     Location of Distress Screening: Medical Oncology    Distress Level: 6 (5/9/2023 10:00 AM)    Physical Concerns:    Fatigue: Y  Pain: Y    Practical Problems:    Emotional Concerns:    Family Concerns:    Spiritual Concerns:        Interventions:     Emotional Needs: emotional suppport/coping strategies; support groups    Comments: Chart reviewed.  OSW mailed her contact letter and new [employer] calendar.  Encouraged patient to reach out if needs arise    Cathy Anderson, MARIA FERNANDAW, LCSW

## 2023-05-22 ENCOUNTER — OFFICE VISIT (OUTPATIENT)
Dept: PLASTIC SURGERY | Facility: CLINIC | Age: 58
End: 2023-05-22
Payer: COMMERCIAL

## 2023-05-22 VITALS
HEIGHT: 66 IN | BODY MASS INDEX: 25.31 KG/M2 | WEIGHT: 157.5 LBS | TEMPERATURE: 97.7 F | SYSTOLIC BLOOD PRESSURE: 122 MMHG | HEART RATE: 78 BPM | DIASTOLIC BLOOD PRESSURE: 78 MMHG | OXYGEN SATURATION: 98 %

## 2023-05-22 DIAGNOSIS — D05.11 DUCTAL CARCINOMA IN SITU (DCIS) OF RIGHT BREAST: ICD-10-CM

## 2023-05-22 DIAGNOSIS — N65.1 DISPROPORTION BETWEEN NATIVE BREAST AND RECONSTRUCTED BREAST: Primary | ICD-10-CM

## 2023-05-26 NOTE — PROGRESS NOTES
"Post-op Follow-up (2 month post op)            History of Present Illness  Mariela Polanco is a 57 y.o. female who presents to [facility] as a post op follow up BREAST RECONSTRUCTION, bilateral breast reconstruction with implant, mesh, use of spy 04/24/2023.      She is here for 2m post op follow up, she is 7wks.  Pt states doing well with no issues or concerns to discuss.      Subjective      Penicillins  Allergies Reconciled.    Review of Systems   All review of system has been reviewed and it  is negative except the ones note above.     Objective     /80 (BP Location: Left arm, Patient Position: Sitting, Cuff Size: Adult)   Pulse 62   Temp 97.9 °F (36.6 °C) (Temporal)   Ht 167.6 cm (65.98\")   Wt 71.2 kg (157 lb)   SpO2 98%   BMI 25.35 kg/m²     Body mass index is 25.35 kg/m².    Physical Exam   Cardiovascular: Normal rate.     Pulmonary/Chest  Effort normal.   Breast: both breasts are still very high. Lower portion of the right breast harder. Left incision healing well.             Result Review :              Assessment and Plan      Diagnoses and all orders for this visit:    1. Ductal carcinoma in situ (DCIS) of right breast (Primary)    2. Disproportion between native breast and reconstructed breast        Additional Order(s):       Plan:  Doing well, released to any activities. PT, she would benefit from Chastening ultrasound to that area. Follow up in 3 months.     Patient was given instructions and counseling regarding her condition. Please see specific information pulled into the AVS if appropriate.     Maryse Wilkinson MD  06/19/2023      "

## 2023-06-01 ENCOUNTER — HOSPITAL ENCOUNTER (OUTPATIENT)
Dept: ULTRASOUND IMAGING | Facility: HOSPITAL | Age: 58
Discharge: HOME | End: 2023-06-01

## 2023-06-01 DIAGNOSIS — R92.8 ABNORMAL MAMMOGRAM OF RIGHT BREAST: ICD-10-CM

## 2023-06-06 ENCOUNTER — PATIENT OUTREACH (OUTPATIENT)
Dept: OTHER | Facility: HOSPITAL | Age: 58
End: 2023-06-06
Payer: COMMERCIAL

## 2023-06-06 ENCOUNTER — LAB (OUTPATIENT)
Dept: LAB | Facility: HOSPITAL | Age: 58
End: 2023-06-06
Payer: COMMERCIAL

## 2023-06-06 PROCEDURE — 80053 COMPREHEN METABOLIC PANEL: CPT | Performed by: INTERNAL MEDICINE

## 2023-06-06 PROCEDURE — 83036 HEMOGLOBIN GLYCOSYLATED A1C: CPT | Performed by: INTERNAL MEDICINE

## 2023-06-06 PROCEDURE — 80061 LIPID PANEL: CPT | Performed by: INTERNAL MEDICINE

## 2023-06-06 NOTE — PROGRESS NOTES
Called MsAntonio Mikesamuelneal to see how she was doing. Left a message with my contact information and asked her to call back at her convenience. Will mail survivorship information as well.

## 2023-06-09 ENCOUNTER — HOSPITAL ENCOUNTER (OUTPATIENT)
Dept: PHYSICAL THERAPY | Facility: HOSPITAL | Age: 58
Setting detail: THERAPIES SERIES
Discharge: HOME | End: 2023-06-09
Payer: COMMERCIAL

## 2023-06-09 DIAGNOSIS — D05.11 DUCTAL CARCINOMA IN SITU (DCIS) OF RIGHT BREAST: ICD-10-CM

## 2023-06-09 DIAGNOSIS — Z90.13 S/P BILATERAL MASTECTOMY: ICD-10-CM

## 2023-06-09 DIAGNOSIS — Z91.89 AT RISK FOR LYMPHEDEMA: Primary | ICD-10-CM

## 2023-06-09 PROCEDURE — 93702 BIS XTRACELL FLUID ANALYSIS: CPT

## 2023-06-09 PROCEDURE — 97161 PT EVAL LOW COMPLEX 20 MIN: CPT

## 2023-06-09 PROCEDURE — 97140 MANUAL THERAPY 1/> REGIONS: CPT

## 2023-06-09 PROCEDURE — 97110 THERAPEUTIC EXERCISES: CPT

## 2023-06-09 NOTE — THERAPY EVALUATION
Physical Therapy Lymphedema Initial Evaluation  [facility]     Patient Name: Mariela Polanco  : 1965  MRN: [id number]  Today's Date: 2023      Visit Date: 2023    Visit Dx:    ICD-10-CM ICD-9-CM   1. At risk for lymphedema  Z91.89 V49.89   2. S/P bilateral mastectomy  Z90.13 V45.71   3. Ductal carcinoma in situ (DCIS) of right breast  D05.11 233.0       Patient Active Problem List   Diagnosis    Essential hypertension    Depression    Gastroesophageal reflux disease    Pre-diabetes    Encounter for screening for malignant neoplasm of colon    Diverticulosis    Ductal carcinoma in situ (DCIS) of right breast    Disproportion between native breast and reconstructed breast        Past Medical History:   Diagnosis Date    Adrenal adenoma     Anxiety     Cholelithiasis     Gall bladder removed    DCIS (ductal carcinoma in situ)     Diverticulitis of colon     History of diverticulitis     Hypertension     Insomnia     Iron deficiency anemia 2019    Menopausal symptoms     Pneumonia     Rosacea     Steatosis, liver     Vitamin D deficiency         Past Surgical History:   Procedure Laterality Date    BREAST BIOPSY Right     BREAST RECONSTRUCTION Bilateral 2023    Procedure: BREAST RECONSTRUCTION, bilateral breast reconstruction with implant, mesh, use of spy;  Surgeon: Maryse Wilkinson MD;  Location: [facility] MAIN OR;  Service: Plastics;  Laterality: Bilateral;     SECTION      CHOLECYSTECTOMY      COLONOSCOPY  10/28/2016    nl with Dr. Everett Botello    COLONOSCOPY N/A 2023    Procedure: COLONOSCOPY FOR SCREENING;  Surgeon: Dakota Nowak MD;  Location: [facility] MAIN OR;  Service: Gastroenterology;  Laterality: N/A;  Diverticulosis, Hemorrhoids    ENDOSCOPY N/A 05/10/2021    Procedure: ESOPHAGOGASTRODUODENOSCOPY WITH BIOPSY;  Surgeon: Dakota Nowak MD;  Location: [facility] MAIN OR;  Service: Gastroenterology;  Laterality: N/A;  GASTRIC POLYPS,  SMALL HIATAL HERNIA, ESOPHAGITIS    MASTECTOMY W/ SENTINEL NODE BIOPSY Right 4/24/2023    Procedure: bilateral breast skin sparing mastectomy with right sentinel lymph node biopsy.;  Surgeon: Miladis Mccormack MD;  Location: [facility];  Service: General;  Laterality: Right;    UPPER GASTROINTESTINAL ENDOSCOPY         Visit Dx:    ICD-10-CM ICD-9-CM   1. At risk for lymphedema  Z91.89 V49.89   2. S/P bilateral mastectomy  Z90.13 V45.71   3. Ductal carcinoma in situ (DCIS) of right breast  D05.11 233.0        Patient History       Row Name 06/09/23 1200             History    Chief Complaint Tightness  -LB      Date Current Problem(s) Began 04/24/23  -LB      Brief Description of Current Complaint Pt s/p B mastectomy, R SLNB with 0/2 LNs removed. She had immediate reconstruction with implant placement and mesh. She is R handed and works from home. She was beginning strength training prior to surgery. She does not require chemotherapy or radiation. She complains of B axillary tightness R > L swelling, discomfort with standing for inc periods of time such as cooking.  -LB      Previous treatment for THIS PROBLEM Surgery  -LB      Surgery Date: 04/24/23  -LB      Patient/Caregiver Goals Relieve pain;Return to prior level of function;Know what to do to help the symptoms;Improve mobility;Improve strength  -LB      Hand Dominance right-handed  -LB      Occupation/sports/leisure activities works from home  -LB      Patient seeing anyone else for problem(s)? yes  -LB      How has patient tried to help current problem? gentle stretches, avoiding inc movement  -LB      History of Previous Related Injuries none  -LB         Pain     Pain Location Shoulder  -LB      Pain at Present 2  -LB      Pain at Best 2  -LB      Pain at Worst 2  -LB      Pain Frequency Constant/continuous  -LB      Pain Description Tightness;Sore  -LB      What Performance Factors Make the Current Problem(s) WORSE? using BUE  -LB      What Performance  Factors Make the Current Problem(s) BETTER? rest  -LB      Tolerance Time- Standing inc low back pain/tightness  -LB      Tolerance Time- Sitting stiffness  -LB      Tolerance Time- Walking is walking 30 minutes and tolerating well  -LB      Tolerance Time- Lying difficulty lying supine  -LB      Is your sleep disturbed? No  -LB      What position do you sleep in? Supine  -LB      Difficulties at work? No issues  -LB      Difficulties with ADL's? Able to perform.  -LB      Difficulties with recreational activities? Has not returned to strength training.  -LB         Fall Risk Assessment    Any falls in the past year: No  -LB         Services    Prior Rehab/Home Health Experiences No  -LB      Are you currently receiving Home Health services No  -LB      Do you plan to receive Home Health services in the near future No  -LB         Daily Activities    Primary Language English  -LB      Pt Participated in POC and Goals Yes  -LB         Safety    Are you being hurt, hit, or frightened by anyone at home or in your life? No  -LB      Are you being neglected by a caregiver No  -LB      Have you had any of the following issues with N/A  -LB                User Key  (r) = Recorded By, (t) = Taken By, (c) = Cosigned By      Initials Name Provider Type    LB Wendy Sotomayor PT Physical Therapist                     Lymphedema       Row Name 06/09/23 1200             Subjective Pain    Able to rate subjective pain? yes  -LB      Pre-Treatment Pain Level 0  -LB         Subjective Comments    Subjective Comments I am doing ok. I am still tight and swollen.  -LB         Lymphedema Assessment    Lymphedema Classification RUE:;at risk/stage 0  -LB      Lymphedema Cancer Related Sx bilateral;modified radical mastectomy;right;sentinel node biopsy  -LB      Lymphedema Surgery Comments 4/24/23  -LB      Lymph Nodes Removed # 2  -LB      Positive Lymph Nodes # 0  -LB      Chemo Received no  -LB      Radiation Therapy Received no  -LB       Infections or Cellulitis? no  -LB         Posture/Observations    Posture/Observations Comments forward head, rounded shoulders, guarded posture  -LB         General ROM    GENERAL ROM COMMENTS BUE limited; flexion 110 deg; 90 deg abduction, ER/IR WFL  -LB         MMT (Manual Muscle Testing)    General MMT Comments BUE grossly 4-/5  -LB         Lymphedema Edema Assessment    Edema Assessment Comment R axillary edema, R breast larger than L  -LB         Skin Changes/Observations    Skin Observations Comment normal healing incisions, L scabbing noted medial incision, R scabbing lateral incision  -LB         Lymphedema Sensation    Lymphedema Sensation Comments dec B breast sensation  -LB         Lymphedema Pulses/Capillary Refill    Lymph Pulses Capillary Refill Comments normal  -LB         Manual Lymphatic Drainage    Manual Therapy discussed manual therapy to reduce muscle guarding  -LB         Compression/Skin Care    Compression/Skin Care Comments discussed compression bra, swell spot  -LB         L-Dex Bioimpedence Screening    L-Dex Measurement Extremity RUE  -LB      L-Dex Patient Position Standing  -LB      L-Dex UE Dominate Side Right  -LB      L-Dex UE At Risk Side Right  -LB      L-Dex UE Pre Surgical Value No  -LB      L-Dex UE Score -4  -LB      L-Dex UE Baseline Score -4  -LB      L-Dex UE Value Change 0  -LB      L-Dex UE Comment WNL  -LB      $ L-Dex Charge yes  -LB                User Key  (r) = Recorded By, (t) = Taken By, (c) = Cosigned By      Initials Name Provider Type    Wendy Morse, TAMIE Physical Therapist                                    Therapy Education  Education Details: [employer]: [id number], discussed lymphedema s/s, bioimpedance results and interpretation, lymphedema prevention, HEP for UE mobility, self massage, swell spot, compression tankl  Given: Symptoms/condition management, HEP, Mobility training  Program: New  How Provided: Verbal, Demonstration, Written  Provided to:  Patient  Level of Understanding: Verbalized, Demonstrated, Teach back education performed       OP Exercises       Row Name 06/09/23 1300 06/09/23 1200          Subjective Comments    Subjective Comments -- I am doing ok. I am still tight and swollen.  -LB        Subjective Pain    Able to rate subjective pain? -- yes  -LB     Pre-Treatment Pain Level -- 0  -LB        Total Minutes    36250 - PT Therapeutic Exercise Minutes -- 15  -LB     07488 - PT Manual Therapy Minutes 15  -LB --        Exercise 1    Exercise Name 1 -- shoulder rolls  -LB     Reps 1 -- 10  -LB        Exercise 2    Exercise Name 2 -- scap retraction  -LB     Reps 2 -- 10  -LB     Time 2 -- 5  -LB        Exercise 3    Exercise Name 3 -- self assist flexion  -LB     Reps 3 -- 10  -LB        Exercise 4    Exercise Name 4 -- wall wash  -LB     Reps 4 -- 10  -LB        Exercise 5    Exercise Name 5 -- standing ER B  -LB     Sets 5 -- 2  -LB     Reps 5 -- 10  -LB        Exercise 6    Exercise Name 6 -- seated butterfly stretch  -LB     Reps 6 -- 3  -LB     Time 6 -- 20  -LB               User Key  (r) = Recorded By, (t) = Taken By, (c) = Cosigned By      Initials Name Provider Type    LB Wendy Sotomayor, PT Physical Therapist                            Manual Rx (last 36 hours)       Manual Treatments       Row Name 06/09/23 1300             Total Minutes    33074 - PT Manual Therapy Minutes 15  -LB         Manual Rx 1    Manual Rx 1 Location LUQ  -LB      Manual Rx 1 Type STM, PROM, MLD  -LB      Manual Rx 1 Duration 15  -LB                User Key  (r) = Recorded By, (t) = Taken By, (c) = Cosigned By      Initials Name Provider Type    LB Wendy Sotomayor, PT Physical Therapist                     PT OP Goals       Row Name 06/09/23 1300          PT Short Term Goals    STG Date to Achieve 06/23/23  -LB     STG 1 Pt will demonstrate understanding and compliance with initial HEP.  -LB     STG 1 Progress New  -LB     STG 2 Pt will acquire swell spot and  compression tank top to address R axillary edema.  -LB     STG 2 Progress New  -LB     STG 3 Pt will be negative for B incisional scabbing.  -LB     STG 3 Progress New  -LB        Long Term Goals    LTG Date to Achieve 07/09/23  -LB     LTG 1 Pt will verbalize s/s of lymphedema and steps to prevention.  -LB     LTG 1 Progress New  -LB     LTG 2 Pt will maintain LDex bioimpedance WNL.  -LB     LTG 2 Progress New  -LB     LTG 3 Pt will demonstrate full AROM BUE.  -LB     LTG 3 Progress New  -LB        Time Calculation    PT Goal Re-Cert Due Date 09/07/23  -LB               User Key  (r) = Recorded By, (t) = Taken By, (c) = Cosigned By      Initials Name Provider Type    Wendy Morse, PT Physical Therapist                     PT Assessment/Plan       Row Name 06/09/23 1307          PT Assessment    Functional Limitations Limitations in functional capacity and performance;Performance in leisure activities;Limitation in home management;Limitations in community activities  -LB     Impairments Edema;Impaired flexibility;Impaired lymphatic circulation;Muscle strength;Pain;Posture;Range of motion;Sensation;Poor body mechanics  -LB     Assessment Comments Mariela Polanco is a 57 y.o. female recently diagnosed with Right Breast Cancer, presents to therapy in evolving condition, s/p Bilateral Modified Radical Mastectomywith [place] Node Biopsy with immediate reconstruction with implants performed on 4/24/23 by surgeons Dr. Mccormack/Minh. Mariela Polanco is at increased risk of post Mastectomy lymphedema syndrome Right Upper Extremity due to Breast surgery Lymph Node Removal . They presents with post-operative decreased range of motion, flexibility, strength, function and increased pain. Currently, negative s/s of lymphedema, infection, seroma, or axillary cording. We did complete a baseline post operative bioimpedance assessment, with current L-Dex score of -4.0 , which is WNL. At this time, functional limitations  can be affected by but not limited to dec BUE AROM, at risk for lymphedema, pain, dec tolerance to cooking/ADLs. Mariela Polanco will benefit from skilled formal Breast Care Physical Therapy at this time to address listed dysfunctions. Please refer to Plan.  -LB     Rehab Potential Good  -LB     Patient/caregiver participated in establishment of treatment plan and goals Yes  -LB     Patient would benefit from skilled therapy intervention Yes  -LB        PT Plan    PT Frequency 1x/week;Other (comment)  -LB     Predicted Duration of Therapy Intervention (PT) 6-8 visits  -LB     Planned CPT's? PT EVAL LOW COMPLEXITY: 02013;PT RE-EVAL: 98264;PT THER PROC EA 15 MIN: 30217;PT THER ACT EA 15 MIN: 32695;PT MANUAL THERAPY EA 15 MIN: 15642;PT NEUROMUSC RE-EDUCATION EA 15 MIN: 54274;PT SELF CARE/HOME MGMT/TRAIN EA 15: 21561;PT BIS XTRACELL FLUID ANALYSIS: 71278  -LB     PT Plan Comments STM as needed, PROM BUE? AROM?, consider progression to strengthening, schedule 3 month bioimpedance, compression tank top/swell spot??  -LB               User Key  (r) = Recorded By, (t) = Taken By, (c) = Cosigned By      Initials Name Provider Type    Wendy Morse, TAMIE Physical Therapist                       Outcome Measure Options: Quick DASH  Quick DASH  Open a tight or new jar.: Moderate Difficulty  Do heavy household chores (e.g., wash walls, wash floors): Moderate Difficulty  Carry a shopping bag or briefcase: Mild Difficulty  Wash your back: Mild Difficulty  Use a knife to cut food: No Difficulty  Recreational activities in which you take some force or impact through your arm, should or hand (e.g. golf, hammering, tennis, etc.): Moderate Difficulty  During the past week, to what extent has your arm, shoulder, or hand problem interfered with your normal social activites with family, friends, neighbors or groups?: Moderately  During the past week, were you limited in your work or other regular daily activities as a result of your  arm, shoulder or hand problem?: Slightly Limited  Arm, Shoulder, or hand pain: Moderate  Tingling (pins and needles) in your arm, shoulder, or hand: None  During the past week, how much difficulty have you had sleeping because of the pain in your arm, shoulder or hand?: Mild Difficulty  Number of Questions Answered: 11  Quick DASH Score: 31.82         Time Calculation:   Start Time: 1130  Stop Time: 1215  Time Calculation (min): 45 min  Total Timed Code Minutes- PT: 30 minute(s)  Timed Charges  59519 - PT Therapeutic Exercise Minutes: 15  58890 - PT Manual Therapy Minutes: 15  Total Minutes  Timed Charges Total Minutes: 15   Total Minutes: 15   Therapy Charges for Today       Code Description Service Date Service Provider Modifiers Qty    84319783835 HC PT BIS XTRACELL FLUID ANALYSIS 6/9/2023 Wendy Sotomayor, PT  1    36405435848 HC PT MANUAL THERAPY EA 15 MIN 6/9/2023 Wendy Sotomayor, PT GP 1    70472328893 HC PT THER PROC EA 15 MIN 6/9/2023 Wendy Sotomayor, PT GP 1    23862090226 HC PT EVAL LOW COMPLEXITY 1 6/9/2023 Wendy Sotomayor, PT GP 1            PT G-Codes  Outcome Measure Options: Quick DASH  Quick DASH Score: 31.82         Wendy Sotomayor PT  6/9/2023

## 2023-06-11 DIAGNOSIS — G47.00 INSOMNIA, UNSPECIFIED TYPE: ICD-10-CM

## 2023-06-12 RX ORDER — TRAZODONE HYDROCHLORIDE 50 MG/1
TABLET ORAL
Qty: 90 TABLET | Refills: 0 | Status: SHIPPED | OUTPATIENT
Start: 2023-06-12

## 2023-06-13 ENCOUNTER — OFFICE VISIT (OUTPATIENT)
Dept: INTERNAL MEDICINE | Facility: CLINIC | Age: 58
End: 2023-06-13
Payer: COMMERCIAL

## 2023-06-13 VITALS
WEIGHT: 155 LBS | TEMPERATURE: 98.4 F | HEIGHT: 66 IN | SYSTOLIC BLOOD PRESSURE: 122 MMHG | DIASTOLIC BLOOD PRESSURE: 86 MMHG | BODY MASS INDEX: 24.91 KG/M2

## 2023-06-13 DIAGNOSIS — R73.03 PRE-DIABETES: ICD-10-CM

## 2023-06-13 DIAGNOSIS — K76.0 FATTY LIVER: ICD-10-CM

## 2023-06-13 DIAGNOSIS — I10 ESSENTIAL HYPERTENSION: Primary | Chronic | ICD-10-CM

## 2023-06-13 NOTE — PROGRESS NOTES
Subjective        Chief Complaint   Patient presents with    Hypertension           Mariela Polanco is a 57 y.o. female who presents for    Patient Active Problem List   Diagnosis    Essential hypertension    Depression    Gastroesophageal reflux disease    Pre-diabetes    Diverticulosis    Ductal carcinoma in situ (DCIS) of right breast    Disproportion between native breast and reconstructed breast       History of Present Illness     She had bilateral mastectomy for DCIS. She had reconstruction and does not need chemo. Her BP has been 112-118/70. She has been stable emotionally. She is seeing Dr. Gallegos and he has been prescribing Wegovy.    Allergies   Allergen Reactions    Penicillins Other (See Comments)     Childhood allergy       Current Outpatient Medications on File Prior to Visit   Medication Sig Dispense Refill    amLODIPine (NORVASC) 5 MG tablet Take 1 tablet by mouth Daily. (Patient taking differently: Take 1 tablet by mouth Every Night.) 90 tablet 1    buPROPion XL (WELLBUTRIN XL) 300 MG 24 hr tablet Take 1 tablet by mouth Daily. (Patient taking differently: Take 1 tablet by mouth Every Night.) 90 tablet 1    NON FORMULARY Take 1 tablet by mouth 2 (Two) Times a Day. LeuSyngery  .  PT HOLDING FOR SURGERY      NON FORMULARY Take 1 tablet by mouth Every Night. Regenamultiultra.  HOLD FOR SURGERY      pantoprazole (PROTONIX) 40 MG EC tablet Take 1 tablet by mouth Daily. (Patient taking differently: Take 1 tablet by mouth Every Night.) 90 tablet 3    traZODone (DESYREL) 50 MG tablet TAKE 1 TABLET BY MOUTH ONCE DAILY AT NIGHT 90 tablet 0    Wegovy 0.5 MG/0.5ML solution auto-injector 1 (One) Time Per Week. SATURDAYS      [DISCONTINUED] Cholecalciferol 50 MCG (2000 UT) capsule Take  by mouth. (Patient not taking: Reported on 6/13/2023)      [DISCONTINUED] Estriol 10 % cream  (Patient not taking: Reported on 6/13/2023)      [DISCONTINUED] Ferrous Fumarate-Vitamin C ER (Heather-Sequels) 65-25 MG CR tablet Take   by mouth 3 (Three) Times a Day. (Patient not taking: Reported on 2023)      [DISCONTINUED] testosterone (ANDROGEL) 50 MG/5GM (1%) gel gel  (Patient not taking: Reported on 2023)       No current facility-administered medications on file prior to visit.       Past Medical History:   Diagnosis Date    Adrenal adenoma     History of diverticulitis     Insomnia     Iron deficiency anemia 2019    Menopausal symptoms     Pneumonia     Steatosis, liver     Vitamin D deficiency        Past Surgical History:   Procedure Laterality Date    BREAST BIOPSY Right     BREAST RECONSTRUCTION Bilateral 2023    Procedure: BREAST RECONSTRUCTION, bilateral breast reconstruction with implant, mesh, use of spy;  Surgeon: Maryse Wilkinson MD;  Location: [facility] MAIN OR;  Service: Plastics;  Laterality: Bilateral;     SECTION      CHOLECYSTECTOMY      COLONOSCOPY  10/28/2016    nl with Dr. Everett Botello    COLONOSCOPY N/A 2023    Procedure: COLONOSCOPY FOR SCREENING;  Surgeon: Dakota Nowak MD;  [place] [facility] [place];  Service: Gastroenterology;  Laterality: N/A;  Diverticulosis, Hemorrhoids    ENDOSCOPY N/A 05/10/2021    Procedure: ESOPHAGOGASTRODUODENOSCOPY WITH BIOPSY;  Surgeon: Dakota Nowak MD;  [place] [facility] [place];  Service: Gastroenterology;  Laterality: N/A;  GASTRIC POLYPS, SMALL HIATAL HERNIA, ESOPHAGITIS    MASTECTOMY W/ SENTINEL NODE BIOPSY Right 2023    Procedure: bilateral breast skin sparing mastectomy with right sentinel lymph node biopsy.;  Surgeon: Miladis Mccormack MD;  Location: [facility] OR;  Service: General;  Laterality: Right;    UPPER GASTROINTESTINAL ENDOSCOPY         Family History   Problem Relation Age of Onset    Other Mother         ESRD (end stage renal disease)    Heart failure Mother         chronic congestive heart failure    Diabetes Mother     Hyperlipidemia Mother     Hypertension Mother     Pneumonia Mother     Skin cancer  "Mother     Diverticulitis Father     Skin cancer Father     Colon polyps Father     Asthma Father     Diabetes Sister     Atrial fibrillation Sister     Diabetes Brother     Cancer Maternal Aunt         possibly ovarian    Crohn's disease Other     Breast cancer Neg Hx     Colon cancer Neg Hx     Irritable bowel syndrome Neg Hx     Ulcerative colitis Neg Hx     Malig Hyperthermia Neg Hx        Social History     Socioeconomic History    Marital status:    Tobacco Use    Smoking status: Never     Passive exposure: Never    Smokeless tobacco: Never   Vaping Use    Vaping Use: Never used   Substance and Sexual Activity    Alcohol use: Yes     Alcohol/week: 3.0 standard drinks     Types: 3 Glasses of wine per week     Comment: social    Drug use: Never    Sexual activity: Yes     Partners: Male     Birth control/protection: Post-menopausal, Tubal ligation           The following portions of the patient's history were reviewed and updated as appropriate: problem list, allergies, current medications, past medical history, past family history, past social history, and past surgical history.    Review of Systems    Immunization History   Administered Date(s) Administered    COVID-19 (MODERNA) 1st,2nd,3rd Dose Monovalent 03/11/2021, 04/08/2021, 11/15/2021, 05/10/2022    COVID-19 (MODERNA) BIVALENT 12+YRS 10/31/2022    Flu Vaccine Intradermal Quad 18-64YR 11/27/2017, 11/12/2018, 09/15/2020    Flu Vaccine Quad PF >36MO 12/27/2019, 12/07/2021    FluLaval/Fluzone >6mos 12/27/2019    Hepatitis A 12/19/2017, 11/12/2018    Hepatitis B Adult/Adolescent IM 03/01/2018, 05/11/2018, 12/10/2018    Influenza, Unspecified 10/31/2022    Shingrix 12/07/2021    Tdap 11/27/2017    Typhoid, Unspecified 12/19/2017       Objective   Vitals:    06/13/23 0754   BP: 122/86   Temp: 98.4 °F (36.9 °C)   Weight: 70.3 kg (155 lb)   Height: 167.6 cm (66\")     Body mass index is 25.02 kg/m².  Physical Exam  Vitals reviewed.   Constitutional:       " Appearance: She is well-developed.   HENT:      Head: Normocephalic and atraumatic.   Cardiovascular:      Rate and Rhythm: Normal rate and regular rhythm.      Heart sounds: Normal heart sounds, S1 normal and S2 normal.   Pulmonary:      Effort: Pulmonary effort is normal.      Breath sounds: Normal breath sounds.   Skin:     General: Skin is warm.   Neurological:      Mental Status: She is alert.   Psychiatric:         Behavior: Behavior normal.       Procedures    Assessment & Plan   Diagnoses and all orders for this visit:    1. Essential hypertension (Primary)    2. Pre-diabetes  -     Hemoglobin A1c; Future    3. Fatty liver  -     Comprehensive Metabolic Panel; Future        BP is good. Reviewed cmp, flp and A1c.           Return in about 6 months (around 12/13/2023) for Annual physical, Lab Before FUP.

## 2023-06-16 LAB — REF LAB TEST RESULTS: NORMAL

## 2023-06-19 ENCOUNTER — OFFICE VISIT (OUTPATIENT)
Dept: PLASTIC SURGERY | Facility: CLINIC | Age: 58
End: 2023-06-19
Payer: COMMERCIAL

## 2023-06-19 VITALS
WEIGHT: 157 LBS | SYSTOLIC BLOOD PRESSURE: 116 MMHG | HEART RATE: 62 BPM | OXYGEN SATURATION: 98 % | DIASTOLIC BLOOD PRESSURE: 80 MMHG | TEMPERATURE: 97.9 F | HEIGHT: 66 IN | BODY MASS INDEX: 25.23 KG/M2

## 2023-06-19 DIAGNOSIS — D05.11 DUCTAL CARCINOMA IN SITU (DCIS) OF RIGHT BREAST: Primary | ICD-10-CM

## 2023-06-19 DIAGNOSIS — N65.1 DISPROPORTION BETWEEN NATIVE BREAST AND RECONSTRUCTED BREAST: ICD-10-CM

## 2023-06-19 PROCEDURE — 99024 POSTOP FOLLOW-UP VISIT: CPT | Performed by: SURGERY

## 2023-09-18 DIAGNOSIS — F32.0 CURRENT MILD EPISODE OF MAJOR DEPRESSIVE DISORDER WITHOUT PRIOR EPISODE: ICD-10-CM

## 2023-09-18 DIAGNOSIS — G47.00 INSOMNIA, UNSPECIFIED TYPE: ICD-10-CM

## 2023-09-18 DIAGNOSIS — I10 ESSENTIAL HYPERTENSION: Chronic | ICD-10-CM

## 2023-09-19 RX ORDER — BUPROPION HYDROCHLORIDE 300 MG/1
TABLET ORAL
Qty: 90 TABLET | Refills: 0 | Status: SHIPPED | OUTPATIENT
Start: 2023-09-19

## 2023-09-19 RX ORDER — AMLODIPINE BESYLATE 5 MG/1
TABLET ORAL
Qty: 90 TABLET | Refills: 0 | Status: SHIPPED | OUTPATIENT
Start: 2023-09-19

## 2023-09-19 RX ORDER — TRAZODONE HYDROCHLORIDE 50 MG/1
TABLET ORAL
Qty: 90 TABLET | Refills: 0 | Status: SHIPPED | OUTPATIENT
Start: 2023-09-19

## 2023-10-19 ENCOUNTER — PATIENT MESSAGE (OUTPATIENT)
Dept: GASTROENTEROLOGY | Facility: CLINIC | Age: 58
End: 2023-10-19
Payer: COMMERCIAL

## 2023-10-19 ENCOUNTER — TELEPHONE (OUTPATIENT)
Dept: GASTROENTEROLOGY | Facility: CLINIC | Age: 58
End: 2023-10-19

## 2023-10-19 RX ORDER — CIPROFLOXACIN 500 MG/1
500 TABLET, FILM COATED ORAL 2 TIMES DAILY
Qty: 20 TABLET | Refills: 0 | Status: SHIPPED | OUTPATIENT
Start: 2023-10-19 | End: 2023-10-29

## 2023-10-19 RX ORDER — METRONIDAZOLE 500 MG/1
500 TABLET ORAL 3 TIMES DAILY
Qty: 30 TABLET | Refills: 0 | Status: SHIPPED | OUTPATIENT
Start: 2023-10-19 | End: 2023-10-29

## 2023-10-19 NOTE — TELEPHONE ENCOUNTER
She also sent a [employer] message that I replied to.  I sent in prescriptions for ciprofloxacin and metronidazole to her pharmacy for diverticulitis.

## 2023-10-19 NOTE — TELEPHONE ENCOUNTER
Caller: Mariela Polanco    Relationship: Self    Best call back number: [phone]    Requested Prescriptions: ANTIBIOTICS  Requested Prescriptions      No prescriptions requested or ordered in this encounter        Pharmacy where request should be sent:  [facility] [address] [place], [place] - [address] - [phone]  - [phone]  [address] [address]     Last office visit with prescribing clinician: 3/11/2022   Last telemedicine visit with prescribing clinician: Visit date not found   Next office visit with prescribing clinician: Visit date not found     Additional details provided by patient: PT IS ASKING IF SHE COULD GET AN ANTIBIOTIC FOR HER DIVERTICULITIS FLARE UP. PT STATES IT WORKS EVERY OTHER TIME SHE HAS HAD FLARE UPS    Does the patient have less than a 3 day supply:  [x] Yes  [] No    Wiliam Riddle Rep   10/19/23 12:35 EDT

## 2023-10-22 DIAGNOSIS — F32.0 CURRENT MILD EPISODE OF MAJOR DEPRESSIVE DISORDER WITHOUT PRIOR EPISODE: ICD-10-CM

## 2023-10-23 RX ORDER — BUPROPION HYDROCHLORIDE 300 MG/1
TABLET ORAL
Qty: 90 TABLET | Refills: 0 | Status: SHIPPED | OUTPATIENT
Start: 2023-10-23

## 2023-11-02 ENCOUNTER — OFFICE VISIT (OUTPATIENT)
Dept: PLASTIC SURGERY | Facility: CLINIC | Age: 58
End: 2023-11-02
Payer: COMMERCIAL

## 2023-11-02 VITALS
HEART RATE: 65 BPM | BODY MASS INDEX: 25.2 KG/M2 | TEMPERATURE: 98.4 F | DIASTOLIC BLOOD PRESSURE: 87 MMHG | HEIGHT: 66 IN | WEIGHT: 156.8 LBS | OXYGEN SATURATION: 97 % | SYSTOLIC BLOOD PRESSURE: 131 MMHG

## 2023-11-02 DIAGNOSIS — N65.1 DISPROPORTION BETWEEN NATIVE BREAST AND RECONSTRUCTED BREAST: Primary | ICD-10-CM

## 2023-11-02 RX ORDER — SYRING-NEEDL,DISP,INSUL,0.3 ML 31GX15/64"
SYRINGE, EMPTY DISPOSABLE MISCELLANEOUS
COMMUNITY
Start: 2023-07-25

## 2023-11-02 NOTE — PROGRESS NOTES
"Post-op Follow-up (6 month post op)            History of Present Illness  Mariela Polanco is a 58 y.o. female who presents to [facility] PLASTIC [facility] RECONSTRU[facility]IVE SURGERY as a post op follow up BREAST RECONSTRUCTION, bilateral breast reconstruction with implant, mesh, use of spy 04/24/2023.    Patient states her breasts are still firm and hard. Feels like her left breast is different than her right and is having some discomfort and uncomfortable pain on left breast. She is wondering when she can discuss getting nipples/tattooing. Patient states she started wearing silicone sheets about a month ago and developed a lot of bruising and \"hematoma\" on left breast so has stopped using since then.        Subjective      Penicillins  Allergies Reconciled.    Review of Systems   All review of system has been reviewed and it  is negative except the ones note above.     Objective     Vital signs stable this visit    /87 (BP Location: Left arm, Patient Position: Sitting, Cuff Size: Adult)   Pulse 65   Temp 98.4 °F (36.9 °C) (Temporal)   Ht 167.6 cm (65.98\")   Wt 71.1 kg (156 lb 12.8 oz)   SpO2 97%   BMI 25.32 kg/m²     Body mass index is 25.32 kg/m².    Physical Exam   Cardiovascular: Normal rate.     Pulmonary/Chest  Effort normal.       Breast:  concern for  capsular contracture on left breast due to deformity left axilla with pulling sensation, no open areas, does have some rippling under left axilla area and skin palpably thin along incision line, no redness or signs of infection. Right breast larger than left, firm to palpation    Result Review :   No new results to review with patient    Assessment and Plan      Diagnoses and all orders for this visit:    1. Disproportion between native breast and reconstructed breast (Primary)          Additional Order(s):       Plan:  Photos obtained today and on handheld. As discussed with patient and , I will  discuss possible capsular " contracture and show photos to Dr. Wilkinson.   We will reach out to patient through "[employer] with a plan once I am able to discuss it with Minh. Patient is aware to hold off on discussing nipple reconstruction or tattooing until we can figure out this issue and when she is fully healed and happy with results.    Per Dr. Wilkinson there is no capsule contracture, appearance of left breast can be improved with fat grafting and skin tailoring.  I sent a message through "[employer] to inform patient. Patient also has the option of having nipples created as well as adjustment of implant size at this time. Per Dr. Wilkinson will plan on 2 hours    Patient instructed to call office for any questions or concerns and verbalized understanding of all instructions given.     Scribed by Eloise Pinzon MA, acting as a scribe for ARTURO Irizarry, 11/02/23 12:03 EDT.  ARTURO Irizarry's signature on the note affirms that the note adequately documents the care provided.      Patient was given instructions and counseling regarding her condition. Please see specific information pulled into the AVS if appropriate.     ARTURO Irizarry  11/02/2023

## 2023-11-03 PROBLEM — N65.1 DISPROPORTION BETWEEN NATIVE BREAST AND RECONSTRUCTED BREAST: Status: RESOLVED | Noted: 2023-04-19 | Resolved: 2023-11-03

## 2023-11-09 LAB
LAB AP CASE REPORT: NORMAL
LAB AP INTRADEPARTMENTAL CONSULT: NORMAL
LAB AP SPECIAL STAINS: NORMAL
LAB AP SYNOPTIC CHECKLIST: NORMAL
PATH REPORT.ADDENDUM SPEC: NORMAL
PATH REPORT.FINAL DX SPEC: NORMAL
PATH REPORT.GROSS SPEC: NORMAL

## 2023-11-28 NOTE — PROGRESS NOTES
"Post-op Follow-up (7 month post op and to discuss next surgery)            History of Present Illness  Mariela Polanco is a 58 y.o. female who presents to [facility]LASTIC [facility] RECONSTRUCTIVE SURGERY as a post op follow up BREAST RECONSTRUCTION, bilateral breast reconstruction with implant, mesh, use of spy 04/24/2023.      Patient is here today to discuss future surgery.      Subjective      Penicillins  Allergies Reconciled.    Review of Systems   All review of system has been reviewed and it  is negative except the ones note above.     Objective         /82 (BP Location: Left arm, Patient Position: Sitting, Cuff Size: Adult)   Pulse 71   Temp 98.6 °F (37 °C) (Temporal)   Ht 167.6 cm (65.98\")   Wt 73.5 kg (162 lb)   SpO2 97%   BMI 26.16 kg/m²     Body mass index is 26.16 kg/m².    Physical Exam   Cardiovascular: Normal rate.     Pulmonary/Chest  Effort normal.       Breast:  high implants with asymmetry, left breast is smaller than right and pocket deformities.      Result Review :   No new results to review with patient    Assessment and Plan      Diagnoses and all orders for this visit:    1. Ductal carcinoma in situ (DCIS) of right breast (Primary)    2. Disproportion between native breast and reconstructed breast            Additional Order(s):       Plan:Bilateral Breast reconstruction revision, with bilateral capsulectomy bilateral fat graft and bilateral nipple reconstruction-2hrs  42987-00 revision of reconstructed breast  21545-90 bilateral nipple reconstruction      Pt will RTC for pre op once surgery is scheduled.      Scribed by Cherie Zendejas, acting as a scribe for Maryse Wilkinson MD, 11/29/23 13:18 EST.  Maryse Wilkinson MD's signature on the note affirms that the note adequately documents the care provided.        Patient was given instructions and counseling regarding her condition. Please see specific information pulled into the AVS if appropriate. "     Maryse Wilkinson MD  11/29/2023

## 2023-11-28 NOTE — H&P (VIEW-ONLY)
"Post-op Follow-up (7 month post op and to discuss next surgery)            History of Present Illness  Mariela Polanco is a 58 y.o. female who presents to [facility]LASTIC [facility] RECONSTRUCTIVE SURGERY as a post op follow up BREAST RECONSTRUCTION, bilateral breast reconstruction with implant, mesh, use of spy 04/24/2023.      Patient is here today to discuss future surgery.      Subjective      Penicillins  Allergies Reconciled.    Review of Systems   All review of system has been reviewed and it  is negative except the ones note above.     Objective         /82 (BP Location: Left arm, Patient Position: Sitting, Cuff Size: Adult)   Pulse 71   Temp 98.6 °F (37 °C) (Temporal)   Ht 167.6 cm (65.98\")   Wt 73.5 kg (162 lb)   SpO2 97%   BMI 26.16 kg/m²     Body mass index is 26.16 kg/m².    Physical Exam   Cardiovascular: Normal rate.     Pulmonary/Chest  Effort normal.       Breast:  high implants with asymmetry, left breast is smaller than right and pocket deformities.      Result Review :   No new results to review with patient    Assessment and Plan      Diagnoses and all orders for this visit:    1. Ductal carcinoma in situ (DCIS) of right breast (Primary)    2. Disproportion between native breast and reconstructed breast            Additional Order(s):       Plan:Bilateral Breast reconstruction revision, with bilateral capsulectomy bilateral fat graft and bilateral nipple reconstruction-2hrs  39518-72 revision of reconstructed breast  60042-49 bilateral nipple reconstruction      Pt will RTC for pre op once surgery is scheduled.      Scribed by Cherie Zendejas, acting as a scribe for Maryse Wilkinson MD, 11/29/23 13:18 EST.  Maryse Wilkinson MD's signature on the note affirms that the note adequately documents the care provided.        Patient was given instructions and counseling regarding her condition. Please see specific information pulled into the AVS if appropriate. "     Maryse Wilkinson MD  11/29/2023

## 2023-11-29 ENCOUNTER — CONSULT (OUTPATIENT)
Dept: PLASTIC SURGERY | Facility: CLINIC | Age: 58
End: 2023-11-29
Payer: COMMERCIAL

## 2023-11-29 VITALS
OXYGEN SATURATION: 97 % | WEIGHT: 162 LBS | HEIGHT: 66 IN | SYSTOLIC BLOOD PRESSURE: 122 MMHG | BODY MASS INDEX: 26.03 KG/M2 | HEART RATE: 71 BPM | DIASTOLIC BLOOD PRESSURE: 82 MMHG | TEMPERATURE: 98.6 F

## 2023-11-29 DIAGNOSIS — D05.11 DUCTAL CARCINOMA IN SITU (DCIS) OF RIGHT BREAST: Primary | ICD-10-CM

## 2023-11-29 DIAGNOSIS — N65.1 DISPROPORTION BETWEEN NATIVE BREAST AND RECONSTRUCTED BREAST: ICD-10-CM

## 2023-11-29 PROCEDURE — 99213 OFFICE O/P EST LOW 20 MIN: CPT | Performed by: SURGERY

## 2023-12-04 ENCOUNTER — PATIENT MESSAGE (OUTPATIENT)
Dept: PLASTIC SURGERY | Facility: CLINIC | Age: 58
End: 2023-12-04
Payer: COMMERCIAL

## 2023-12-04 ENCOUNTER — PREP FOR SURGERY (OUTPATIENT)
Dept: OTHER | Facility: HOSPITAL | Age: 58
End: 2023-12-04
Payer: COMMERCIAL

## 2023-12-04 DIAGNOSIS — N65.1 DISPROPORTION BETWEEN NATIVE BREAST AND RECONSTRUCTED BREAST: Primary | ICD-10-CM

## 2023-12-04 DIAGNOSIS — Z01.818 PRE-OPERATIVE EXAMINATION: Primary | ICD-10-CM

## 2023-12-04 RX ORDER — ACETAMINOPHEN 500 MG
1000 TABLET ORAL ONCE
OUTPATIENT
Start: 2023-12-04 | End: 2023-12-04

## 2023-12-04 RX ORDER — CLINDAMYCIN HYDROCHLORIDE 300 MG/1
300 CAPSULE ORAL
OUTPATIENT
Start: 2023-12-05 | End: 2023-12-06

## 2023-12-04 RX ORDER — ACETAMINOPHEN 500 MG
1000 TABLET ORAL EVERY 8 HOURS
Qty: 18 TABLET | Refills: 0 | Status: CANCELLED | OUTPATIENT
Start: 2023-12-04 | End: 2023-12-10

## 2023-12-04 RX ORDER — GABAPENTIN 300 MG/1
300 CAPSULE ORAL 3 TIMES DAILY
Qty: 18 CAPSULE | Refills: 0 | Status: CANCELLED | OUTPATIENT
Start: 2023-12-04 | End: 2023-12-10

## 2023-12-04 RX ORDER — SCOLOPAMINE TRANSDERMAL SYSTEM 1 MG/1
1 PATCH, EXTENDED RELEASE TRANSDERMAL CONTINUOUS
OUTPATIENT
Start: 2023-12-04 | End: 2023-12-07

## 2023-12-04 RX ORDER — CLINDAMYCIN HYDROCHLORIDE 150 MG/1
150 CAPSULE ORAL 4 TIMES DAILY
Qty: 40 CAPSULE | Refills: 0 | Status: CANCELLED | OUTPATIENT
Start: 2023-12-04 | End: 2023-12-14

## 2023-12-04 RX ORDER — NAPROXEN 250 MG/1
250 TABLET ORAL 2 TIMES DAILY PRN
Qty: 12 TABLET | Refills: 0 | Status: CANCELLED | OUTPATIENT
Start: 2023-12-04

## 2023-12-04 RX ORDER — ONDANSETRON 4 MG/1
4 TABLET, FILM COATED ORAL EVERY 6 HOURS PRN
Qty: 20 TABLET | Refills: 0 | Status: CANCELLED | OUTPATIENT
Start: 2023-12-04 | End: 2024-12-03

## 2023-12-14 ENCOUNTER — TELEPHONE (OUTPATIENT)
Dept: PLASTIC SURGERY | Facility: CLINIC | Age: 58
End: 2023-12-14
Payer: COMMERCIAL

## 2023-12-14 NOTE — TELEPHONE ENCOUNTER
Patient returned call and will work on signing the consents within the next couple days. Patient requested information regarding recovery for surgery.     -Education for breast reconstruction, fat grafting and nipple reconstruction were sent via [employer].

## 2023-12-14 NOTE — TELEPHONE ENCOUNTER
Left voicemail, following up with patient regarding consents for surgery. These consents must be signed prior to surgery. Asked patient to send a [employer] message or call the office once these are completed or patient may stop by the office to sign.

## 2023-12-17 DIAGNOSIS — I10 ESSENTIAL HYPERTENSION: Chronic | ICD-10-CM

## 2023-12-17 DIAGNOSIS — F32.0 CURRENT MILD EPISODE OF MAJOR DEPRESSIVE DISORDER WITHOUT PRIOR EPISODE: ICD-10-CM

## 2023-12-17 DIAGNOSIS — G47.00 INSOMNIA, UNSPECIFIED TYPE: ICD-10-CM

## 2023-12-18 RX ORDER — AMLODIPINE BESYLATE 5 MG/1
TABLET ORAL
Qty: 90 TABLET | Refills: 0 | Status: SHIPPED | OUTPATIENT
Start: 2023-12-18

## 2023-12-18 RX ORDER — BUPROPION HYDROCHLORIDE 300 MG/1
TABLET ORAL
Qty: 90 TABLET | Refills: 0 | Status: SHIPPED | OUTPATIENT
Start: 2023-12-18

## 2023-12-18 RX ORDER — TRAZODONE HYDROCHLORIDE 50 MG/1
TABLET ORAL
Qty: 90 TABLET | Refills: 0 | Status: SHIPPED | OUTPATIENT
Start: 2023-12-18

## 2023-12-19 NOTE — PROGRESS NOTES
"Post-op Follow-up (2 week post op)            History of Present Illness  Mariela Polanco is a 58 y.o. female who presents to [facility] PLASTIC [facility] RECONSTRUCTIVE SURGERY as a post op follow up BREAST RECONSTRUCTION REVISION, Bilateral Breast reconstruction revision, with bilateral capsulectomy bilateral fat graft and bilateral nipple reconstruction 12/26/23.    Pt presents today for 2 week post op. She states rt breast is swollen and very bruised. She states uncomfortable and tender to touch.      Subjective      Penicillins  Allergies Reconciled.    Review of Systems   All review of system has been reviewed and it  is negative except the ones note above.     Objective     Vital signs stable this visit    /82 (BP Location: Left arm, Patient Position: Sitting, Cuff Size: Adult)   Pulse 61   Temp 97.6 °F (36.4 °C) (Temporal)   Ht 167.6 cm (65.98\")   Wt 73.5 kg (162 lb)   SpO2 98%   BMI 26.16 kg/m²     Body mass index is 26.16 kg/m².    Physical Exam   Cardiovascular: Normal rate.     Pulmonary/Chest  Effort normal.       Breast:  right breast with hematoma   Result Review :   No new results to review with patient    Assessment and Plan      Diagnoses and all orders for this visit:    1. Hematoma (Primary)    2. Disproportion between native breast and reconstructed breast    Other orders  -     sulfamethoxazole-trimethoprim (Bactrim DS) 800-160 MG per tablet; Take 1 tablet by mouth 2 (Two) Times a Day.  Dispense: 20 tablet; Refill: 0            Additional Order(s):       Plan:  Under sterile conditions, I opened the right breast and drained the clots. A drain was placed.   Patient was given instructions and counseling regarding her condition. Please see specific information pulled into the AVS if appropriate.     Maryse Wilkinson MD  01/08/2024      "

## 2023-12-21 RX ORDER — CHOLECALCIFEROL (VITAMIN D3) 125 MCG
500 CAPSULE ORAL DAILY
COMMUNITY

## 2023-12-21 RX ORDER — CHLORAL HYDRATE 500 MG
CAPSULE ORAL
COMMUNITY

## 2023-12-26 ENCOUNTER — ANESTHESIA (OUTPATIENT)
Dept: PERIOP | Facility: HOSPITAL | Age: 58
End: 2023-12-26
Payer: COMMERCIAL

## 2023-12-26 ENCOUNTER — ANESTHESIA EVENT (OUTPATIENT)
Dept: PERIOP | Facility: HOSPITAL | Age: 58
End: 2023-12-26
Payer: COMMERCIAL

## 2023-12-26 ENCOUNTER — HOSPITAL ENCOUNTER (OUTPATIENT)
Facility: HOSPITAL | Age: 58
Setting detail: HOSPITAL OUTPATIENT SURGERY
Discharge: HOME | End: 2023-12-26
Attending: SURGERY | Admitting: SURGERY
Payer: COMMERCIAL

## 2023-12-26 VITALS
DIASTOLIC BLOOD PRESSURE: 75 MMHG | WEIGHT: 162.26 LBS | OXYGEN SATURATION: 92 % | HEART RATE: 59 BPM | BODY MASS INDEX: 26.08 KG/M2 | RESPIRATION RATE: 18 BRPM | HEIGHT: 66 IN | TEMPERATURE: 97.4 F | SYSTOLIC BLOOD PRESSURE: 112 MMHG

## 2023-12-26 DIAGNOSIS — N65.1 DISPROPORTION BETWEEN NATIVE BREAST AND RECONSTRUCTED BREAST: Primary | ICD-10-CM

## 2023-12-26 LAB — COTININE UR-MCNC: NEGATIVE NG/ML

## 2023-12-26 PROCEDURE — 25010000002 PROPOFOL 10 MG/ML EMULSION

## 2023-12-26 PROCEDURE — 0 HYDROMORPHONE 1 MG/ML SOLUTION

## 2023-12-26 PROCEDURE — 25010000002 BUPIVACAINE (PF) 0.5 % SOLUTION 30 ML VIAL: Performed by: SURGERY

## 2023-12-26 PROCEDURE — 25010000002 DEXAMETHASONE PER 1 MG

## 2023-12-26 PROCEDURE — 25810000003 LACTATED RINGERS PER 1000 ML: Performed by: ANESTHESIOLOGY

## 2023-12-26 PROCEDURE — G0480 DRUG TEST DEF 1-7 CLASSES: HCPCS | Performed by: SURGERY

## 2023-12-26 PROCEDURE — 25010000002 KETOROLAC TROMETHAMINE PER 15 MG

## 2023-12-26 PROCEDURE — 25010000002 HYDROMORPHONE 1 MG/ML SOLUTION

## 2023-12-26 PROCEDURE — 25010000002 CLINDAMYCIN 900 MG/50ML SOLUTION: Performed by: SURGERY

## 2023-12-26 PROCEDURE — 25010000002 SUGAMMADEX 200 MG/2ML SOLUTION

## 2023-12-26 PROCEDURE — 25010000002 ONDANSETRON PER 1 MG

## 2023-12-26 PROCEDURE — 25010000002 DEXAMETHASONE PER 1 MG: Performed by: SURGERY

## 2023-12-26 PROCEDURE — 25010000002 EPINEPHRINE (ANAPHYLAXIS) 1 MG/ML SOLUTION: Performed by: SURGERY

## 2023-12-26 RX ORDER — OXYCODONE HYDROCHLORIDE AND ACETAMINOPHEN 5; 325 MG/1; MG/1
1 TABLET ORAL EVERY 6 HOURS PRN
Qty: 12 TABLET | Refills: 0 | OUTPATIENT
Start: 2023-12-26

## 2023-12-26 RX ORDER — SODIUM CHLORIDE, SODIUM LACTATE, POTASSIUM CHLORIDE, CALCIUM CHLORIDE 600; 310; 30; 20 MG/100ML; MG/100ML; MG/100ML; MG/100ML
9 INJECTION, SOLUTION INTRAVENOUS CONTINUOUS PRN
Status: DISCONTINUED | OUTPATIENT
Start: 2023-12-26 | End: 2023-12-26 | Stop reason: HOSPADM

## 2023-12-26 RX ORDER — ONDANSETRON 4 MG/1
4 TABLET, FILM COATED ORAL DAILY PRN
Qty: 9 TABLET | Refills: 0 | Status: SHIPPED | OUTPATIENT
Start: 2023-12-26

## 2023-12-26 RX ORDER — OXYCODONE HYDROCHLORIDE 5 MG/1
5 TABLET ORAL
Status: DISCONTINUED | OUTPATIENT
Start: 2023-12-26 | End: 2023-12-26 | Stop reason: HOSPADM

## 2023-12-26 RX ORDER — PROPOFOL 10 MG/ML
VIAL (ML) INTRAVENOUS AS NEEDED
Status: DISCONTINUED | OUTPATIENT
Start: 2023-12-26 | End: 2023-12-26 | Stop reason: SURG

## 2023-12-26 RX ORDER — MAGNESIUM HYDROXIDE 1200 MG/15ML
LIQUID ORAL AS NEEDED
Status: DISCONTINUED | OUTPATIENT
Start: 2023-12-26 | End: 2023-12-26 | Stop reason: HOSPADM

## 2023-12-26 RX ORDER — SODIUM CHLORIDE, SODIUM LACTATE, POTASSIUM CHLORIDE, AND CALCIUM CHLORIDE .6; .31; .03; .02 G/100ML; G/100ML; G/100ML; G/100ML
INJECTION, SOLUTION INTRAVENOUS AS NEEDED
Status: DISCONTINUED | OUTPATIENT
Start: 2023-12-26 | End: 2023-12-26 | Stop reason: HOSPADM

## 2023-12-26 RX ORDER — DEXAMETHASONE SODIUM PHOSPHATE 4 MG/ML
INJECTION, SOLUTION INTRA-ARTICULAR; INTRALESIONAL; INTRAMUSCULAR; INTRAVENOUS; SOFT TISSUE AS NEEDED
Status: DISCONTINUED | OUTPATIENT
Start: 2023-12-26 | End: 2023-12-26 | Stop reason: SURG

## 2023-12-26 RX ORDER — CLINDAMYCIN PHOSPHATE 900 MG/50ML
900 INJECTION, SOLUTION INTRAVENOUS ONCE
Status: COMPLETED | OUTPATIENT
Start: 2023-12-26 | End: 2023-12-26

## 2023-12-26 RX ORDER — ONDANSETRON 2 MG/ML
4 INJECTION INTRAMUSCULAR; INTRAVENOUS ONCE AS NEEDED
Status: DISCONTINUED | OUTPATIENT
Start: 2023-12-26 | End: 2023-12-26 | Stop reason: HOSPADM

## 2023-12-26 RX ORDER — ACETAMINOPHEN 500 MG
1000 TABLET ORAL ONCE
Status: COMPLETED | OUTPATIENT
Start: 2023-12-26 | End: 2023-12-26

## 2023-12-26 RX ORDER — SCOLOPAMINE TRANSDERMAL SYSTEM 1 MG/1
1 PATCH, EXTENDED RELEASE TRANSDERMAL CONTINUOUS
Status: DISCONTINUED | OUTPATIENT
Start: 2023-12-26 | End: 2023-12-26 | Stop reason: HOSPADM

## 2023-12-26 RX ORDER — ACETAMINOPHEN 500 MG
1000 TABLET ORAL EVERY 6 HOURS PRN
Qty: 18 TABLET | Refills: 0 | Status: SHIPPED | OUTPATIENT
Start: 2023-12-26

## 2023-12-26 RX ORDER — PROMETHAZINE HYDROCHLORIDE 12.5 MG/1
25 TABLET ORAL ONCE AS NEEDED
Status: DISCONTINUED | OUTPATIENT
Start: 2023-12-26 | End: 2023-12-26 | Stop reason: HOSPADM

## 2023-12-26 RX ORDER — ONDANSETRON 4 MG/1
4 TABLET, FILM COATED ORAL DAILY PRN
Qty: 9 TABLET | Refills: 0 | OUTPATIENT
Start: 2023-12-26

## 2023-12-26 RX ORDER — OXYCODONE HYDROCHLORIDE AND ACETAMINOPHEN 5; 325 MG/1; MG/1
1 TABLET ORAL EVERY 6 HOURS PRN
Qty: 12 TABLET | Refills: 0 | Status: SHIPPED | OUTPATIENT
Start: 2023-12-26

## 2023-12-26 RX ORDER — EPHEDRINE SULFATE 50 MG/ML
INJECTION, SOLUTION INTRAVENOUS AS NEEDED
Status: DISCONTINUED | OUTPATIENT
Start: 2023-12-26 | End: 2023-12-26 | Stop reason: SURG

## 2023-12-26 RX ORDER — CLINDAMYCIN HYDROCHLORIDE 300 MG/1
300 CAPSULE ORAL
Status: DISCONTINUED | OUTPATIENT
Start: 2023-12-27 | End: 2023-12-26

## 2023-12-26 RX ORDER — ACETAMINOPHEN 500 MG
1000 TABLET ORAL ONCE
Status: DISCONTINUED | OUTPATIENT
Start: 2023-12-26 | End: 2023-12-26

## 2023-12-26 RX ORDER — KETOROLAC TROMETHAMINE 30 MG/ML
INJECTION, SOLUTION INTRAMUSCULAR; INTRAVENOUS AS NEEDED
Status: DISCONTINUED | OUTPATIENT
Start: 2023-12-26 | End: 2023-12-26 | Stop reason: SURG

## 2023-12-26 RX ORDER — DIPHENHYDRAMINE HYDROCHLORIDE 50 MG/ML
12.5 INJECTION INTRAMUSCULAR; INTRAVENOUS ONCE AS NEEDED
Status: DISCONTINUED | OUTPATIENT
Start: 2023-12-26 | End: 2023-12-26 | Stop reason: HOSPADM

## 2023-12-26 RX ORDER — LIDOCAINE HYDROCHLORIDE 20 MG/ML
INJECTION, SOLUTION EPIDURAL; INFILTRATION; INTRACAUDAL; PERINEURAL AS NEEDED
Status: DISCONTINUED | OUTPATIENT
Start: 2023-12-26 | End: 2023-12-26 | Stop reason: SURG

## 2023-12-26 RX ORDER — ONDANSETRON 2 MG/ML
INJECTION INTRAMUSCULAR; INTRAVENOUS AS NEEDED
Status: DISCONTINUED | OUTPATIENT
Start: 2023-12-26 | End: 2023-12-26 | Stop reason: SURG

## 2023-12-26 RX ORDER — NAPROXEN 250 MG/1
250 TABLET ORAL 2 TIMES DAILY
Qty: 12 TABLET | Refills: 0 | Status: SHIPPED | OUTPATIENT
Start: 2023-12-26

## 2023-12-26 RX ORDER — NAPROXEN 250 MG/1
250 TABLET ORAL 2 TIMES DAILY
Qty: 12 TABLET | Refills: 0 | OUTPATIENT
Start: 2023-12-26

## 2023-12-26 RX ORDER — ACETAMINOPHEN 500 MG
1000 TABLET ORAL EVERY 6 HOURS PRN
Qty: 18 TABLET | Refills: 0 | OUTPATIENT
Start: 2023-12-26

## 2023-12-26 RX ORDER — PROMETHAZINE HYDROCHLORIDE 25 MG/1
25 SUPPOSITORY RECTAL ONCE AS NEEDED
Status: DISCONTINUED | OUTPATIENT
Start: 2023-12-26 | End: 2023-12-26 | Stop reason: HOSPADM

## 2023-12-26 RX ORDER — ROCURONIUM BROMIDE 10 MG/ML
INJECTION, SOLUTION INTRAVENOUS AS NEEDED
Status: DISCONTINUED | OUTPATIENT
Start: 2023-12-26 | End: 2023-12-26 | Stop reason: SURG

## 2023-12-26 RX ORDER — GLYCOPYRROLATE 0.2 MG/ML
INJECTION INTRAMUSCULAR; INTRAVENOUS AS NEEDED
Status: DISCONTINUED | OUTPATIENT
Start: 2023-12-26 | End: 2023-12-26 | Stop reason: SURG

## 2023-12-26 RX ORDER — MEPERIDINE HYDROCHLORIDE 25 MG/ML
12.5 INJECTION INTRAMUSCULAR; INTRAVENOUS; SUBCUTANEOUS
Status: DISCONTINUED | OUTPATIENT
Start: 2023-12-26 | End: 2023-12-26 | Stop reason: HOSPADM

## 2023-12-26 RX ORDER — EPINEPHRINE 1 MG/ML
INJECTION, SOLUTION INTRAMUSCULAR; SUBCUTANEOUS AS NEEDED
Status: DISCONTINUED | OUTPATIENT
Start: 2023-12-26 | End: 2023-12-26 | Stop reason: HOSPADM

## 2023-12-26 RX ORDER — DEXMEDETOMIDINE HYDROCHLORIDE 100 UG/ML
INJECTION, SOLUTION INTRAVENOUS AS NEEDED
Status: DISCONTINUED | OUTPATIENT
Start: 2023-12-26 | End: 2023-12-26 | Stop reason: SURG

## 2023-12-26 RX ADMIN — DEXMEDETOMIDINE 8 MCG: 100 INJECTION, SOLUTION, CONCENTRATE INTRAVENOUS at 13:42

## 2023-12-26 RX ADMIN — ROCURONIUM BROMIDE 50 MG: 50 INJECTION INTRAVENOUS at 12:59

## 2023-12-26 RX ADMIN — EPHEDRINE SULFATE 5 MG: 50 INJECTION INTRAVENOUS at 13:46

## 2023-12-26 RX ADMIN — DEXMEDETOMIDINE 8 MCG: 100 INJECTION, SOLUTION, CONCENTRATE INTRAVENOUS at 14:37

## 2023-12-26 RX ADMIN — SUGAMMADEX 150 MG: 100 INJECTION, SOLUTION INTRAVENOUS at 15:02

## 2023-12-26 RX ADMIN — EPHEDRINE SULFATE 10 MG: 50 INJECTION INTRAVENOUS at 14:47

## 2023-12-26 RX ADMIN — SCOPALAMINE 1 PATCH: 1 PATCH, EXTENDED RELEASE TRANSDERMAL at 10:55

## 2023-12-26 RX ADMIN — GLYCOPYRROLATE 0.2 MG: 0.2 INJECTION INTRAMUSCULAR; INTRAVENOUS at 12:57

## 2023-12-26 RX ADMIN — DEXMEDETOMIDINE 12 MCG: 100 INJECTION, SOLUTION, CONCENTRATE INTRAVENOUS at 12:58

## 2023-12-26 RX ADMIN — HYDROMORPHONE HYDROCHLORIDE 0.5 MG: 1 INJECTION, SOLUTION INTRAMUSCULAR; INTRAVENOUS; SUBCUTANEOUS at 13:24

## 2023-12-26 RX ADMIN — SODIUM CHLORIDE, POTASSIUM CHLORIDE, SODIUM LACTATE AND CALCIUM CHLORIDE 9 ML/HR: 600; 310; 30; 20 INJECTION, SOLUTION INTRAVENOUS at 10:54

## 2023-12-26 RX ADMIN — HYDROMORPHONE HYDROCHLORIDE 0.5 MG: 1 INJECTION, SOLUTION INTRAMUSCULAR; INTRAVENOUS; SUBCUTANEOUS at 15:47

## 2023-12-26 RX ADMIN — DEXAMETHASONE SODIUM PHOSPHATE 8 MG: 4 INJECTION, SOLUTION INTRAMUSCULAR; INTRAVENOUS at 13:11

## 2023-12-26 RX ADMIN — DEXMEDETOMIDINE 12 MCG: 100 INJECTION, SOLUTION, CONCENTRATE INTRAVENOUS at 13:24

## 2023-12-26 RX ADMIN — PROPOFOL 40 MG: 10 INJECTION, EMULSION INTRAVENOUS at 14:39

## 2023-12-26 RX ADMIN — SODIUM CHLORIDE, POTASSIUM CHLORIDE, SODIUM LACTATE AND CALCIUM CHLORIDE: 600; 310; 30; 20 INJECTION, SOLUTION INTRAVENOUS at 14:38

## 2023-12-26 RX ADMIN — EPHEDRINE SULFATE 10 MG: 50 INJECTION INTRAVENOUS at 14:18

## 2023-12-26 RX ADMIN — HYDROMORPHONE HYDROCHLORIDE 0.5 MG: 1 INJECTION, SOLUTION INTRAMUSCULAR; INTRAVENOUS; SUBCUTANEOUS at 15:39

## 2023-12-26 RX ADMIN — KETOROLAC TROMETHAMINE 30 MG: 30 INJECTION, SOLUTION INTRAMUSCULAR; INTRAVENOUS at 15:00

## 2023-12-26 RX ADMIN — CLINDAMYCIN PHOSPHATE 900 MG: 900 INJECTION, SOLUTION INTRAVENOUS at 13:09

## 2023-12-26 RX ADMIN — ONDANSETRON 4 MG: 2 INJECTION INTRAMUSCULAR; INTRAVENOUS at 14:56

## 2023-12-26 RX ADMIN — ACETAMINOPHEN 1000 MG: 500 TABLET ORAL at 10:54

## 2023-12-26 RX ADMIN — PROPOFOL 160 MG: 10 INJECTION, EMULSION INTRAVENOUS at 12:59

## 2023-12-26 RX ADMIN — PROPOFOL 50 MCG/KG/MIN: 10 INJECTION, EMULSION INTRAVENOUS at 13:05

## 2023-12-26 RX ADMIN — LIDOCAINE HYDROCHLORIDE 50 MG: 20 INJECTION, SOLUTION EPIDURAL; INFILTRATION; INTRACAUDAL; PERINEURAL at 12:59

## 2023-12-26 NOTE — OP NOTE
Plastic Surgery Op Note    BILATERAL BREAST RECONSTRUCTION REVISION WITH FAT, GRAFTING, BILATERAL CAPSULECTOMY AND NIPPLE RECONSTRUCTION    Mariela Polanco  12/26/2023    Pre-op Diagnosis:   Disproportion between native breast and reconstructed breast [N65.1]    Post-Op Diagnosis Codes:     * Disproportion between native breast and reconstructed breast [N65.1]    Anesthesia: General    Staff:   Circulator: Radha Casey RN; Fannie Snow RN  Scrub Person: Agustina Rodriguez  Assistant: Cindy Merida RN CSA  Other: Radha Leavitt RN    Surgeon: Dr Wilkinson  First Assistant: MARLA Cassidy who was instrumental in helping with hemostasis, visualization and retraction structures as well as surgical closure.  Her skilled assistance was necessary for the success of this case.      Estimated Blood Loss: 50 mL    Specimens:   Order Name Source Comment Collection Info Order Time   NICOTINE SCREEN, URINE Urine, Clean Catch  Collected By: Wendi Liang PCT 12/26/2023 10:07 AM     Release to patient   Routine Release              Drains:   [REMOVED] Closed/Suction Drain Inferior;Lateral;Right Breast Bulb 15 Fr. (Removed)       [REMOVED] Closed/Suction Drain Inferior;Lateral;Left Breast Bulb 15 Fr. (Removed)       Findings:     Fat graft: 360 cc  Mesh was well incorporated on both sides, there was no fluid in the breast pocket and the implants had no abnormalities       Implants:      The original implants were kept in this surgery.     Complications: none     After risks and benefits were discussed with patient and informed consent was signed, patient was taken to the OR and positioned supine. The surgical site was then prepped in a sterile fashion way and a time out was performed confirming patient's name and procedure to be performed. All surgical team was introduced by name.   I started with tumescent injection over the abdomen and with a cannula #4, lipoaspirate was obtained and fat was trapped on a  fat graft system. While the fat was being processed, I proceeded with the remaining of the surgery.  I then incised both breasts and temporarily removed the implants that were kept on irrisept. Then, I performed bilateral capsulotomy at the bases of the breasts and fat grafted both breasts. Left more than the right. I then irrigated the cavity with irrisept and placed both implants back with a morejon funnel. Then, I closed the capsule with 2-0 PDS. Then, the skin was closed in two layers with insorb followed by 3-0 vicryl. I then performed bilateral nipple reconstruction with C-V skin rotation and suturing with 3-0 vicryl followed by 4-0 monocryl. Surgical glue was applied to all the incisions.   Patient tolerated procedure well, there were no complications, all instruments were checked and patient was awakened and taken to PACU in stable condition.  I was present for the entire procedure.     Date: 12/26/2023  Time: 15:09 JENNIFER Wilkinson MD  12/26/23  15:09 JENNIFER

## 2023-12-26 NOTE — ANESTHESIA PREPROCEDURE EVALUATION
Anesthesia Evaluation     NPO Solid Status: > 8 hours  NPO Liquid Status: > 8 hours           Airway   Mallampati: II  TM distance: >3 FB  Neck ROM: full  No difficulty expected  Dental - normal exam     Pulmonary - normal exam   Cardiovascular - normal exam    (+) hypertension well controlled      Neuro/Psych  (+) psychiatric history Depression  GI/Hepatic/Renal/Endo    (+) GERD well controlled, liver disease    Musculoskeletal     Abdominal  - normal exam    Bowel sounds: normal.   Substance History      OB/GYN          Other      history of cancer active                Anesthesia Plan    ASA 3     general     intravenous induction     Anesthetic plan, risks, benefits, and alternatives have been provided, discussed and informed consent has been obtained with: patient.  Pre-procedure education provided  Plan discussed with CRNA.    CODE STATUS:

## 2023-12-26 NOTE — ANESTHESIA POSTPROCEDURE EVALUATION
Patient: Mariela Polanco    Procedure Summary       Date: 12/26/23 Room / Location: [facility]  /  [facility]    Anesthesia Start: 1252 Anesthesia Stop: 1519    Procedures:       Bilateral Breast reconstruction revision, with bilateral capsulectomy, and bilateral nipple reconstruction (Bilateral: Breast)      FAT GRAFTING (Bilateral) Diagnosis:       Disproportion between native breast and reconstructed breast      (Disproportion between native breast and reconstructed breast [N65.1])    Surgeons: Maryse Wilkinson MD Provider: Mery Springer CRNA    Anesthesia Type: general ASA Status: 3            Anesthesia Type: general    Vitals  Vitals Value Taken Time   /75 12/26/23 1630   Temp 36.3 °C (97.4 °F) 12/26/23 1528   Pulse 59 12/26/23 1630   Resp 18 12/26/23 1538   SpO2 92 % 12/26/23 1630           Post Anesthesia Care and Evaluation    Patient location during evaluation: bedside  Patient participation: complete - patient participated  Level of consciousness: awake  Pain management: adequate    Airway patency: patent  PONV Status: none  Cardiovascular status: acceptable  Respiratory status: acceptable  Hydration status: acceptable    Comments: An Anesthesiologist personally participated in the most demanding procedures (including induction and emergence if applicable) in the anesthesia plan, monitored the course of anesthesia administration at frequent intervals and remained physically present and available for immediate diagnosis and treatment of emergencies.

## 2023-12-26 NOTE — DISCHARGE INSTRUCTIONS
Ok for regular diet  Do not drive for next 2 weeks  Ok to shower in 3 days   Do not exercise for the next 6 weeks.  Sleep in an upright position  No bra for 1 week. After that, wear a comfortable soft bra  Ok to move arms slowly to the shoulder level (brushing hair movement)  Do not fly for 2 months    Take post-operative medications as directed on the bottle.     If you experience any issues or concerns, please contact the office at [phone]. If after hours a call service will notify the on-call provider.       DISCHARGE INSTRUCTIONS  SURGICAL / AMBULATORY  PROCEDURES      For your surgery you had:  General anesthesia (you may have a sore throat for the first 24 hours)    You received a medicated patch for nausea prevention today (behind your ear). It is recommended that you remove it 24-48 hours post-operatively. It must be removed within 72 hours.   You may experience dizziness, drowsiness, or light-headedness for several hours following surgery/procedure.  Do not stay alone today or tonight.  Limit your activity for 24 hours.  Resume your diet slowly.  Follow whatever special dietary instructions you may have been given by your doctor.  You should not drive or operate machinery, drink alcohol, or sign legally binding documents for 24 hours or while you are taking pain medication.  Last dose of pain medication was given at:   .  NOTIFY YOUR DOCTOR IF YOU EXPERIENCE ANY OF THE FOLLOWING:  Temperature greater than 101 degrees Fahrenheit  Shaking Chills  Redness or excessive drainage from incision  Nausea, vomiting and/or pain that is not controlled by prescribed medications  Increase in bleeding or bleeding that is excessive  Unable to urinate in 6 hours after surgery  If unable to reach your doctor, please go to the closest Emergency Room  Medications per physician instructions as indicated on Discharge Medication Information Sheet.    SPECIAL INSTRUCTIONS:

## 2023-12-26 NOTE — ANESTHESIA PROCEDURE NOTES
Airway  Urgency: elective    Date/Time: 12/26/2023 1:02 PM  Airway not difficult    General Information and Staff    Patient location during procedure: OR  CRNA/CAA: Mery Springer CRNA    Indications and Patient Condition  Indications for airway management: airway protection    Preoxygenated: yes  Mask difficulty assessment: 1 - vent by mask    Final Airway Details  Final airway type: endotracheal airway      Successful airway: ETT  Cuffed: yes   Successful intubation technique: direct laryngoscopy  Facilitating devices/methods: intubating stylet  Endotracheal tube insertion site: oral  Blade: Cason  Blade size: 2  ETT size (mm): 7.0  Cormack-Lehane Classification: grade I - full view of glottis  Placement verified by: chest auscultation and capnometry   Measured from: teeth  ETT/EBT  to teeth (cm): 21  Number of attempts at approach: 1  Assessment: lips, teeth, and gum same as pre-op and atraumatic intubation

## 2023-12-26 NOTE — INTERVAL H&P NOTE
H&P reviewed. The patient was examined and there are no changes to the H&P.    Patient is not tachycardic  Respirations are non elaborated  Vitals:    12/26/23 1027   BP: 131/79   Pulse: 53   Resp: 18   Temp: 97.6 °F (36.4 °C)   SpO2: 99%     Risks and benefits reminded to patient who agrees to proceed

## 2024-01-08 ENCOUNTER — OFFICE VISIT (OUTPATIENT)
Dept: PLASTIC SURGERY | Facility: CLINIC | Age: 59
End: 2024-01-08
Payer: COMMERCIAL

## 2024-01-08 VITALS
DIASTOLIC BLOOD PRESSURE: 82 MMHG | BODY MASS INDEX: 26.03 KG/M2 | HEART RATE: 61 BPM | TEMPERATURE: 97.6 F | HEIGHT: 66 IN | OXYGEN SATURATION: 98 % | WEIGHT: 162 LBS | SYSTOLIC BLOOD PRESSURE: 123 MMHG

## 2024-01-08 DIAGNOSIS — N65.1 DISPROPORTION BETWEEN NATIVE BREAST AND RECONSTRUCTED BREAST: ICD-10-CM

## 2024-01-08 DIAGNOSIS — T14.8XXA HEMATOMA: Primary | ICD-10-CM

## 2024-01-08 PROCEDURE — 99024 POSTOP FOLLOW-UP VISIT: CPT | Performed by: SURGERY

## 2024-01-08 RX ORDER — SULFAMETHOXAZOLE AND TRIMETHOPRIM 800; 160 MG/1; MG/1
1 TABLET ORAL 2 TIMES DAILY
Qty: 20 TABLET | Refills: 0 | Status: SHIPPED | OUTPATIENT
Start: 2024-01-08

## 2024-01-16 ENCOUNTER — TELEPHONE (OUTPATIENT)
Dept: PLASTIC SURGERY | Facility: CLINIC | Age: 59
End: 2024-01-16
Payer: COMMERCIAL

## 2024-01-16 PROBLEM — T14.8XXA HEMATOMA: Status: ACTIVE | Noted: 2024-01-16

## 2024-02-02 NOTE — PROGRESS NOTES
"Post-op Follow-up (2 month post op)            History of Present Illness  Mariela Polanco is a 58 y.o. female who presents to [facility]LASTIC [facility] RECONSTRU[facility]IVE SURGERY as a post op follow up BREAST RECONSTRUCTION REVISION, Bilateral Breast reconstruction revision, with bilateral capsulectomy bilateral fat graft and bilateral nipple reconstruction 12/26/23.    Pt presents today for 1m post op. Pt states rt breast is still larger and feels swollen. No longer bruised.    Subjective      Penicillins  Allergies Reconciled.    Review of Systems   All review of system has been reviewed and it  is negative except the ones note above.     Objective     Vital signs stable this visit    /82 (BP Location: Left arm, Patient Position: Sitting, Cuff Size: Adult)   Pulse 63   Temp 97.6 °F (36.4 °C) (Temporal)   Ht 167.6 cm (65.98\")   Wt 74.4 kg (164 lb)   SpO2 100%   BMI 26.48 kg/m²     Body mass index is 26.48 kg/m².    Physical Exam   Cardiovascular: Normal rate.     Pulmonary/Chest  Effort normal.       Breast:    Right breast still higher than the left but no signs of hematoma     Result Review :   No new results to review with patient    Assessment and Plan      Diagnoses and all orders for this visit:    1. Disproportion between native breast and reconstructed breast (Primary)    2. Ductal carcinoma in situ (DCIS) of right breast              Additional Order(s):       Plan:    We will follow up to check how the right breast will progress.   Photos obtained today in clinic    Maryse Wilkinson MD  02/05/2024      "

## 2024-02-05 ENCOUNTER — OFFICE VISIT (OUTPATIENT)
Dept: PLASTIC SURGERY | Facility: CLINIC | Age: 59
End: 2024-02-05
Payer: COMMERCIAL

## 2024-02-05 VITALS
SYSTOLIC BLOOD PRESSURE: 127 MMHG | HEIGHT: 66 IN | BODY MASS INDEX: 26.36 KG/M2 | TEMPERATURE: 97.6 F | HEART RATE: 63 BPM | WEIGHT: 164 LBS | OXYGEN SATURATION: 100 % | DIASTOLIC BLOOD PRESSURE: 82 MMHG

## 2024-02-05 DIAGNOSIS — N65.1 DISPROPORTION BETWEEN NATIVE BREAST AND RECONSTRUCTED BREAST: Primary | ICD-10-CM

## 2024-02-05 DIAGNOSIS — D05.11 DUCTAL CARCINOMA IN SITU (DCIS) OF RIGHT BREAST: ICD-10-CM

## 2024-02-05 PROCEDURE — 99024 POSTOP FOLLOW-UP VISIT: CPT | Performed by: SURGERY

## 2024-02-23 ENCOUNTER — TELEPHONE (OUTPATIENT)
Dept: PLASTIC SURGERY | Facility: CLINIC | Age: 59
End: 2024-02-23
Payer: COMMERCIAL

## 2024-02-23 ENCOUNTER — TREATMENT (OUTPATIENT)
Dept: PLASTIC SURGERY | Facility: CLINIC | Age: 59
End: 2024-02-23
Payer: COMMERCIAL

## 2024-02-23 DIAGNOSIS — L53.9 REDNESS OF SKIN: Primary | ICD-10-CM

## 2024-02-23 RX ORDER — CIPROFLOXACIN 500 MG/1
500 TABLET, FILM COATED ORAL 2 TIMES DAILY
Qty: 20 TABLET | Refills: 0 | Status: SHIPPED | OUTPATIENT
Start: 2024-02-23 | End: 2024-03-04

## 2024-03-06 RX ORDER — SULFAMETHOXAZOLE AND TRIMETHOPRIM 800; 160 MG/1; MG/1
1 TABLET ORAL 2 TIMES DAILY
Qty: 20 TABLET | Refills: 0 | Status: SHIPPED | OUTPATIENT
Start: 2024-03-06

## 2024-03-07 ENCOUNTER — OFFICE VISIT (OUTPATIENT)
Dept: PLASTIC SURGERY | Facility: CLINIC | Age: 59
End: 2024-03-07
Payer: COMMERCIAL

## 2024-03-07 VITALS
SYSTOLIC BLOOD PRESSURE: 131 MMHG | BODY MASS INDEX: 26.36 KG/M2 | HEART RATE: 68 BPM | HEIGHT: 66 IN | TEMPERATURE: 97.8 F | DIASTOLIC BLOOD PRESSURE: 82 MMHG | OXYGEN SATURATION: 96 % | WEIGHT: 164 LBS

## 2024-03-07 DIAGNOSIS — L53.9 REDNESS OF SKIN: Primary | ICD-10-CM

## 2024-03-07 DIAGNOSIS — N61.0 CELLULITIS OF RIGHT BREAST: ICD-10-CM

## 2024-03-07 PROCEDURE — 87205 SMEAR GRAM STAIN: CPT | Performed by: SURGERY

## 2024-03-07 PROCEDURE — 87070 CULTURE OTHR SPECIMN AEROBIC: CPT | Performed by: SURGERY

## 2024-03-07 NOTE — PROGRESS NOTES
"Post-op Follow-up (Breast redness)            History of Present Illness  Mariela Polanco is a 58 y.o. female who presents to [facility] PLASTIC [facility] RECONSTRUCTIVE SURGERY as a post op follow up BREAST RECONSTRUCTION REVISION, Bilateral Breast reconstruction revision, with bilateral capsulectomy bilateral fat graft and bilateral nipple reconstruction 12/26/23.    Pt presents today for 2.5m post op and rt breast redness. Pt started antibiotic yesterday (2nd)round.      Denies any fever, chills or itching pt states can be uncomfortable at times      Subjective      Penicillins  Allergies Reconciled.    Review of Systems   All review of system has been reviewed and it  is negative except the ones note above.     Objective     Vital signs stable this visit    /82 (BP Location: Left arm, Patient Position: Sitting, Cuff Size: Adult)   Pulse 68   Temp 97.8 °F (36.6 °C) (Temporal)   Ht 167.6 cm (65.98\")   Wt 74.4 kg (164 lb)   SpO2 96%   BMI 26.48 kg/m²     Body mass index is 26.48 kg/m².    Physical Exam   Cardiovascular: Normal rate.     Pulmonary/Chest  Effort normal.       Breast: Right breast redness, ultrasound found minium fluid    Result Review :   No new results to review with patient    Assessment and Plan      Diagnoses and all orders for this visit:    1. Redness of skin (Primary)  -     Wound Culture - Wound, Breast, Right; Future  -     Wound Culture - Wound, Breast, Right          Additional Order(s):   Procedure:  IOP Right Breast Exploration      Consent obtained. Local injected to site, Lidocaine 1% with epinephrine.  Site prepped with chlorhexidine   in sterile fashion. Site draped in sterile fashion. I dissected down through skin and subcutaneous tissue. 5 cc of serous fluid was observed and allowed to exit the breast pocket. No pus observed. I got a sample for culture. . Site was thoroughly irrigated. Site closed with  two layers of 3-0  vicryl.  Site cleaned with sterile " normal saline. Steri-strips applied. The patient tolerated the procedure well with no immediate complications.  I also decided to give patient 40mg of kenalog shot to decrease the redness.     Kenalog-40 1ml  NDC: [id number]  LOT:[id number]  EXP: 31/25    After informed consent was obtained the area was wiped with ChloraPrep. T The patient tolerated the procedure well with no immediate complications.       Discussed steroid injection with risk and benefits or excision with steroid injection. Patient wanted to proceed with injection at this time and tolerated procedure well. I counseled her on continued use of silicone sheets and we'll see her back in 1-2 months. Discussed if area is not improved after several injections then may consider scar revision with steroid injection.     Sent wound culture from serous fluid.    Therapeutic ultrasound performed today after procedure    Pt will return to clinic 3/15/24 and will also do another therapeutic ultrasound day of appt      Scribed by Cherie Zendejas, acting as a scribe for Maryse Wilkinson MD, 03/07/24 11:17 EST.  Maryse Wilkinson MD's signature on the note affirms that the note adequately documents the care provided.    Cherie Zendejas  03/07/2024

## 2024-03-08 ENCOUNTER — LAB (OUTPATIENT)
Dept: LAB | Facility: HOSPITAL | Age: 59
End: 2024-03-08
Payer: COMMERCIAL

## 2024-03-08 PROCEDURE — 83036 HEMOGLOBIN GLYCOSYLATED A1C: CPT | Performed by: INTERNAL MEDICINE

## 2024-03-08 PROCEDURE — 80053 COMPREHEN METABOLIC PANEL: CPT | Performed by: INTERNAL MEDICINE

## 2024-03-09 PROBLEM — N61.0 CELLULITIS OF RIGHT BREAST: Status: ACTIVE | Noted: 2024-03-09

## 2024-03-10 LAB
BACTERIA SPEC AEROBE CULT: NORMAL
GRAM STN SPEC: NORMAL
GRAM STN SPEC: NORMAL

## 2024-03-13 NOTE — PROGRESS NOTES
Post-op Follow-up, f/u on right breast redness, hematoma            History of Present Illness  Mariela Polanco is a 58 y.o. female who presents to [facility] PLASTIC & RECONSTRUCTIVE SURGERY as a post op follow up BREAST RECONSTRUCTION REVISION, Bilateral Breast reconstruction revision, with bilateral capsulectomy bilateral fat graft and bilateral nipple reconstruction 12/26/23.    Pt states the redness has improved since the procedure on 3/7/24. She states she has stopped the Bactrim she was having cramps at night, patient reports Dr. Wilkinson is aware..      Wound Culture - Wound, Breast, Right  Order: [id number]  Status: Final result       Visible to patient: Yes (seen)       Next appt: 03/15/2024 at 10:30 AM in Plastic Surgery (Bibi Edmonds, APRN)       Dx: Redness of skin    Specimen Information: Breast, Right; Wound   0 Result Notes  Wound Culture   Lab   No growth at 3 days  [facility]               Gram Stain  Lab   Few (2+) WBCs seen  [facility]      No organisms seen [facility]                    Specimen Collected: 03/07/24 11:59 EST Last Resulted: 03/10/24 09:31 EDT                 Subjective      Penicillins  Allergies Reconciled.    Review of Systems   Constitutional: Negative.    HENT: Negative.     Eyes: Negative.    Respiratory: Negative.     Cardiovascular: Negative.    Gastrointestinal: Negative.    Endocrine: Negative.    Genitourinary: Negative.    Musculoskeletal: Negative.    Skin:  Positive for color change and bruise.        Right breast redness has improved    Allergic/Immunologic: Negative.    Neurological: Negative.    Hematological: Negative.    Psychiatric/Behavioral: Negative.        All review of system has been reviewed and it  is negative except the ones note above.     Objective     Vital signs stable this visit    /82 (BP Location: Left arm, Patient Position: Sitting, Cuff Size: Adult)   Pulse 59   Temp 98.2 °F (36.8 °C) (Temporal)   Ht 167.6 cm  "(65.98\")   Wt 72.6 kg (160 lb)   SpO2 98%   BMI 25.84 kg/m²     Body mass index is 25.84 kg/m².    Physical Exam   Vitals reviewed.  Constitutional  She appears well-developed and well-nourished.     Eyes  System normal.       Cardiovascular: Normal rate.     Pulmonary/Chest  Effort normal.     Skin  Skin is warm and dry. There is erythema.     Psychiatric  She has a normal mood and affect.     Breast    Additional breast conditions include the following:   Right Breast: There is a surgical incision.The following is true regarding the right breast:                         Nipple is viable  Left Breast: There is a surgical incision. The following is true regarding the left breast:                         Nipple is viable      Overall breast comments: Right breast sitting high on chest, and feels tight to patient. She states it does feel lower since seroma/hematoma treated in office. Picture taken and attached to chart, erythema is resolving and much improved. Bilateral breast and nipple  incisions healing well, no open areas          Result Review :   Reviewed wound culture report with patient.     Assessment and Plan      Diagnoses and all orders for this visit:    1. Hematoma (Primary)    2. Cellulitis of right breast    3. Postoperative follow-up            Additional Order(s):   Picture attached to chart, erythema much improved and resolving.  Bedside ultrasound utilized, no seromas noted  Therapeutic ultrasound today to right breast  RTC 2-3 weeks for f/u and repeat therapeutic ultrasound  Patient instructed to call office for any questions or concerns and verbalized understanding of all instructions given.      Bibi Edmonds, APRN  03/15/2024      "

## 2024-03-15 ENCOUNTER — OFFICE VISIT (OUTPATIENT)
Dept: PLASTIC SURGERY | Facility: CLINIC | Age: 59
End: 2024-03-15
Payer: COMMERCIAL

## 2024-03-15 VITALS
DIASTOLIC BLOOD PRESSURE: 82 MMHG | SYSTOLIC BLOOD PRESSURE: 133 MMHG | WEIGHT: 160 LBS | OXYGEN SATURATION: 98 % | HEART RATE: 59 BPM | HEIGHT: 66 IN | TEMPERATURE: 98.2 F | BODY MASS INDEX: 25.71 KG/M2

## 2024-03-15 DIAGNOSIS — Z09 POSTOPERATIVE FOLLOW-UP: ICD-10-CM

## 2024-03-15 DIAGNOSIS — T14.8XXA HEMATOMA: Primary | ICD-10-CM

## 2024-03-15 DIAGNOSIS — N61.0 CELLULITIS OF RIGHT BREAST: ICD-10-CM

## 2024-03-17 DIAGNOSIS — I10 ESSENTIAL HYPERTENSION: Chronic | ICD-10-CM

## 2024-03-17 DIAGNOSIS — G47.00 INSOMNIA, UNSPECIFIED TYPE: ICD-10-CM

## 2024-03-18 ENCOUNTER — OFFICE VISIT (OUTPATIENT)
Dept: INTERNAL MEDICINE | Facility: CLINIC | Age: 59
End: 2024-03-18
Payer: COMMERCIAL

## 2024-03-18 VITALS
DIASTOLIC BLOOD PRESSURE: 82 MMHG | BODY MASS INDEX: 25.75 KG/M2 | HEIGHT: 66 IN | SYSTOLIC BLOOD PRESSURE: 122 MMHG | WEIGHT: 160.2 LBS

## 2024-03-18 DIAGNOSIS — R73.03 PRE-DIABETES: ICD-10-CM

## 2024-03-18 DIAGNOSIS — F32.89 OTHER DEPRESSION: ICD-10-CM

## 2024-03-18 DIAGNOSIS — I10 ESSENTIAL HYPERTENSION: Chronic | ICD-10-CM

## 2024-03-18 DIAGNOSIS — Z00.00 WELLNESS EXAMINATION: Primary | ICD-10-CM

## 2024-03-18 PROCEDURE — 99396 PREV VISIT EST AGE 40-64: CPT | Performed by: INTERNAL MEDICINE

## 2024-03-18 RX ORDER — AMLODIPINE BESYLATE 5 MG/1
TABLET ORAL
Qty: 90 TABLET | Refills: 0 | Status: SHIPPED | OUTPATIENT
Start: 2024-03-18

## 2024-03-18 RX ORDER — TRAZODONE HYDROCHLORIDE 50 MG/1
TABLET ORAL
Qty: 90 TABLET | Refills: 0 | Status: SHIPPED | OUTPATIENT
Start: 2024-03-18

## 2024-03-18 NOTE — PROGRESS NOTES
"Subjective        Chief Complaint   Patient presents with    Annual Exam           Mariela Polanco is a 58 y.o. female who presents for    Patient Active Problem List   Diagnosis    Essential hypertension    Depression    Gastroesophageal reflux disease    Pre-diabetes    Diverticulosis    Ductal carcinoma in situ (DCIS) of right breast       History of Present Illness       She had f/u plastic surgery and then had cellulitis on her right breast. She had abx. Her BP has been 120/80. She has been doing well emotionally. She denies chest pain, dyspnea, nausea or abdominal pain.   Allergies   Allergen Reactions    Penicillins Other (See Comments)     Childhood allergy       Current Outpatient Medications on File Prior to Visit   Medication Sig Dispense Refill    amLODIPine (NORVASC) 5 MG tablet Take 1 tablet by mouth once daily 90 tablet 0    BD Veo Insulin Syringe U/F 31G X 15/64\" 0.3 ML misc USE WITH PEPTID ADMINISTRATION      buPROPion XL (WELLBUTRIN XL) 300 MG 24 hr tablet Take 1 tablet by mouth once daily (Patient taking differently: Every Night.) 90 tablet 0    Omega-3 Fatty Acids (fish oil) 1000 MG capsule capsule Take  by mouth Daily With Breakfast.      traZODone (DESYREL) 50 MG tablet TAKE 1 TABLET BY MOUTH ONCE DAILY AT NIGHT 90 tablet 0    vitamin B-12 (CYANOCOBALAMIN) 500 MCG tablet Take 1 tablet by mouth Daily.      [DISCONTINUED] acetaminophen (TYLENOL) 500 MG tablet Take 2 tablets by mouth Every 6 (Six) Hours As Needed for Moderate Pain for up to 18 doses. (Patient not taking: Reported on 3/15/2024) 18 tablet 0    [DISCONTINUED] amLODIPine (NORVASC) 5 MG tablet Take 1 tablet by mouth once daily (Patient taking differently: Every Night.) 90 tablet 0    [DISCONTINUED] naproxen (NAPROSYN) 250 MG tablet Take 1 tablet by mouth 2 (Two) Times a Day. (Patient not taking: Reported on 3/7/2024) 12 tablet 0    [DISCONTINUED] NON FORMULARY Take 1 tablet by mouth 2 (Two) Times a Day. [facility]  .  PT HOLDING FOR " SURGERY (Patient not taking: Reported on 3/18/2024)      [DISCONTINUED] NON FORMULARY Take 1 tablet by mouth Every Night. [facility]  HOLD FOR SURGERY (Patient not taking: Reported on 3/18/2024)      [DISCONTINUED] ondansetron (Zofran) 4 MG tablet Take 1 tablet by mouth Daily As Needed for Nausea or Vomiting for up to 18 doses. (Patient not taking: Reported on 3/7/2024) 9 tablet 0    [DISCONTINUED] oxyCODONE-acetaminophen (Percocet) 5-325 MG per tablet Take 1 tablet by mouth Every 6 (Six) Hours As Needed for Moderate Pain. (Patient not taking: Reported on 3/7/2024) 12 tablet 0    [DISCONTINUED] sulfamethoxazole-trimethoprim (Bactrim DS) 800-160 MG per tablet Take 1 tablet by mouth 2 (Two) Times a Day. (Patient not taking: Reported on 3/15/2024) 20 tablet 0    [DISCONTINUED] traZODone (DESYREL) 50 MG tablet TAKE 1 TABLET BY MOUTH ONCE DAILY AT NIGHT 90 tablet 0     No current facility-administered medications on file prior to visit.       Past Medical History:   Diagnosis Date    H/O Pneumonia     History of diverticulitis     Insomnia     Iron deficiency anemia 2019    Steatosis, liver     Vitamin D deficiency        Past Surgical History:   Procedure Laterality Date    BREAST BIOPSY Right     BREAST RECONSTRUCTION Bilateral 2023    Procedure: BREAST RECONSTRUCTION, bilateral breast reconstruction with implant, mesh, use of spy;  Surgeon: Maryse Wilkinson MD;  Location: [facility];  Service: Plastics;  Laterality: Bilateral;    BREAST RECONSTRUCTION Bilateral 2023    Procedure: Bilateral Breast reconstruction revision, with bilateral capsulectomy, and bilateral nipple reconstruction;  Surgeon: Maryse Wilkinson MD;  Location: [facility];  Service: Plastics;  Laterality: Bilateral;     SECTION      CHOLECYSTECTOMY      COLONOSCOPY  10/28/2016    nl with Dr. Everett Botello    COLONOSCOPY N/A 2023    Procedure: COLONOSCOPY FOR SCREENING;  Surgeon: Eliu  Dakota METZGER MD;  Location: [facility] MAIN OR;  Service: Gastroenterology;  Laterality: N/A;  Diverticulosis, Hemorrhoids    ENDOSCOPY N/A 05/10/2021    Procedure: ESOPHAGOGASTRODUODENOSCOPY WITH BIOPSY;  Surgeon: Dakota Nowak MD;  Location: [facility] MAIN OR;  Service: Gastroenterology;  Laterality: N/A;  GASTRIC POLYPS, SMALL HIATAL HERNIA, ESOPHAGITIS    FAT GRAFTING Bilateral 12/26/2023    Procedure: FAT GRAFTING;  Surgeon: Maryse Wilkinson MD;  Location: [facility];  Service: Plastics;  Laterality: Bilateral;    MASTECTOMY W/ SENTINEL NODE BIOPSY Right 4/24/2023    Procedure: bilateral breast skin sparing mastectomy with right sentinel lymph node biopsy.;  Surgeon: Miladis Mccormack MD;  Location: [facility] MAIN OR;  Service: General;  Laterality: Right;    UPPER GASTROINTESTINAL ENDOSCOPY         Family History   Problem Relation Age of Onset    Other Mother         ESRD (end stage renal disease)    Heart failure Mother         chronic congestive heart failure    Diabetes Mother     Hyperlipidemia Mother     Hypertension Mother     Pneumonia Mother     Skin cancer Mother     Diverticulitis Father     Skin cancer Father     Colon polyps Father     Asthma Father     Diabetes Sister     Atrial fibrillation Sister     Diabetes Brother     Cancer Maternal Aunt         possibly ovarian    Crohn's disease Other     Breast cancer Neg Hx     Colon cancer Neg Hx     Irritable bowel syndrome Neg Hx     Ulcerative colitis Neg Hx     Malig Hyperthermia Neg Hx        Social History     Socioeconomic History    Marital status:    Tobacco Use    Smoking status: Never     Passive exposure: Never    Smokeless tobacco: Never   Vaping Use    Vaping status: Never Used   Substance and Sexual Activity    Alcohol use: Yes     Alcohol/week: 3.0 standard drinks of alcohol     Types: 3 Glasses of wine per week     Comment: social    Drug use: Never    Sexual activity: Yes     Partners: Male     Birth control/protection:  "Post-menopausal, Tubal ligation           The following portions of the patient's history were reviewed and updated as appropriate: problem list, allergies, current medications, past medical history, past family history, past social history, and past surgical history.    Review of Systems    Immunization History   Administered Date(s) Administered    COVID-19 (MODERNA) 1st,2nd,3rd Dose Monovalent 03/11/2021, 04/08/2021, 11/15/2021, 05/10/2022    COVID-19 (MODERNA) BIVALENT 12+YRS 10/31/2022    Flu Vaccine Intradermal Quad 18-64YR 11/27/2017, 11/12/2018, 09/15/2020    Flu Vaccine Quad PF >36MO 12/27/2019, 12/07/2021    Fluzone (or Fluarix & Flulaval for VFC) >6mos 12/27/2019    Hepatitis A 12/19/2017, 11/12/2018    Hepatitis B Adult/Adolescent IM 03/01/2018, 05/11/2018, 12/10/2018    Influenza, Unspecified 10/31/2022    Shingrix 12/07/2021    Tdap 11/27/2017    Typhoid, Unspecified 12/19/2017       Objective   Vitals:    03/18/24 1521   BP: 122/82   Weight: 72.7 kg (160 lb 3.2 oz)   Height: 167.6 cm (65.98\")     Body mass index is 25.87 kg/m².  Physical Exam  Vitals reviewed.   Constitutional:       Appearance: She is well-developed.   HENT:      Head: Normocephalic and atraumatic.      Mouth/Throat:      Mouth: Mucous membranes are moist.      Pharynx: Oropharynx is clear.   Eyes:      Extraocular Movements: Extraocular movements intact.      Conjunctiva/sclera: Conjunctivae normal.      Pupils: Pupils are equal, round, and reactive to light.   Neck:      Thyroid: No thyromegaly.      Vascular: No carotid bruit.   Cardiovascular:      Rate and Rhythm: Normal rate and regular rhythm.      Heart sounds: Normal heart sounds. No murmur heard.  Pulmonary:      Effort: Pulmonary effort is normal.      Breath sounds: Normal breath sounds.   Abdominal:      General: There is no distension.      Palpations: Abdomen is soft. There is no mass.      Tenderness: There is no abdominal tenderness. There is no rebound. "   Musculoskeletal:      Cervical back: Neck supple.   Lymphadenopathy:      Cervical: No cervical adenopathy.   Skin:     General: Skin is warm.   Neurological:      Mental Status: She is alert.   Psychiatric:         Behavior: Behavior normal.         Procedures    Assessment & Plan   Diagnoses and all orders for this visit:    1. Wellness examination (Primary)    2. Essential hypertension  -     Comprehensive Metabolic Panel; Future  -     Lipid Panel With / Chol / HDL Ratio; Future  -     CBC & Differential; Future    3. Pre-diabetes  -     Hemoglobin A1c; Future    4. Other depression  Comments:  Doing well emotionally.                 Recc second Shingrix and exercising 150 minutes per week. Name of gyn for pap. Reviewed cmp and A1c. Cscope is UTD.  Return in about 6 months (around 9/18/2024) for Lab Before FUP.

## 2024-03-21 DIAGNOSIS — F32.0 CURRENT MILD EPISODE OF MAJOR DEPRESSIVE DISORDER WITHOUT PRIOR EPISODE: ICD-10-CM

## 2024-03-22 RX ORDER — BUPROPION HYDROCHLORIDE 300 MG/1
300 TABLET ORAL DAILY
Qty: 90 TABLET | Refills: 1 | Status: SHIPPED | OUTPATIENT
Start: 2024-03-22

## 2024-03-25 NOTE — PROGRESS NOTES
"Post-op Follow-up (Breast Recon Revision), f/u on right breast redness, hematoma            History of Present Illness  Mariela Polanco is a 58 y.o. female who presents to [facility] PLASTIC [facility] RECONSTRUCTIVE SURGERY as a post op follow up BREAST RECONSTRUCTION REVISION, Bilateral Breast reconstruction revision, with bilateral capsulectomy bilateral fat graft and bilateral nipple reconstruction 12/26/23.    Pt presents today for 3 month post op and follow up on rt breast redness. Pt will also have therapeutic ultrasound today.  Patient states that the redness has improved.    Patient recently had a fall and broke her left shoulder.      Subjective      Penicillins  Allergies Reconciled.    Review of Systems   Constitutional: Negative.    HENT: Negative.     Eyes: Negative.    Respiratory: Negative.     Cardiovascular: Negative.    Gastrointestinal: Negative.    Endocrine: Negative.    Genitourinary: Negative.    Musculoskeletal:  Positive for arthralgias and myalgias.   Skin: Negative.         Right breast redness has improved    Allergic/Immunologic: Negative.    Neurological: Negative.    Hematological: Negative.    Psychiatric/Behavioral: Negative.        All review of system has been reviewed and it  is negative except the ones note above.     Objective     Vital signs stable this visit    /97 (BP Location: Left arm, Patient Position: Sitting, Cuff Size: Adult)   Pulse 64   Temp 98.9 °F (37.2 °C) (Oral)   Ht 167.6 cm (65.98\")   Wt 73.4 kg (161 lb 12.8 oz)   SpO2 98%   BMI 26.13 kg/m²     Body mass index is 26.13 kg/m².    Physical Exam   Vitals reviewed.  Constitutional  She appears well-developed and well-nourished.     Eyes  System normal.       Cardiovascular: Normal rate.     Pulmonary/Chest  Effort normal.     Skin  Skin is warm and dry.     Psychiatric  She has a normal mood and affect.     Breast    Additional breast conditions include the following:   Right Breast: There is " a surgical incision.The following is true regarding the right breast:                         Nipple is viable  Left Breast: There is a surgical incision. The following is true regarding the left breast:                         Nipple is viable      Overall breast comments: Right breast sitting high on chest, and feels tight to patient, states it is continuing to feel softer. No erythema noted. Bilateral breast and nipple  incisions healing well, no open areas. Scar tissue noted right lateral breast to  IMF. Will continue to massage area and do therapeutic Ultrasound in the office today          Result Review :   Reviewed wound culture report with patient.     Assessment and Plan    Theraputic Ultrasound done in office today to right breast for 7 min, 1.1w/cm2 3.3mhz. Patient tolerated well and will return for next scheduled ultrasound/appt.      Diagnoses and all orders for this visit:    1. S/P breast reconstruction, bilateral (Primary)              RTO 3 months for 6 month post operative follow up  S/p fall with injury to left shoulder  Discussed with patient that right breast and arm might be more sore as she has to compensate for left shoulder fracture at this time  patient instructed to call office for any questions or concerns and verbalized understanding of all instructions given.      Scribed by Luz Marina Lopez, acting as a scribe for ARTURO Irizarry, 04/01/24 09:26 EDT.  ARTURO Irizarry's signature on the note affirms that the note adequately documents the care provided.   ARTURO Irizarry  04/01/2024

## 2024-03-28 NOTE — PROGRESS NOTES
[facility]  New Problem      Patient Name: Mariela Polanco  : 1965  Primary Care Physician: Kuldeep Robles MD        Chief Complaint: Left shoulder pain    HPI:   Mariela Polanco is a 58 y.o. year old who presents today for evaluation.  Patient reports an injury of her left shoulder in the [place] airport.  She was pulling her suitcase behind her moving sidewalk was ending and got caught up, she tripped and essentially landed on the left shoulder.  She heard and felt a pop with immediate severe pain.        She went on to her final destination and [facility] and was evaluated in the emergency room.  They discove displaced fracture in the shoulder and stabilized her in a sling.  She is here today with new x-rays for further evaluation and treatment unfortunately we could not upload her CD.  She is right-hand dominant, she works full-time still does a lot of computer work.  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        History is significant for double mastectomy last year, she just recently regained the full use of both of her upper extremities.      Past Medical/Surgical, Social and Family History:  I have reviewed and/or updated pertinent history as noted in the medical record including:  Past Medical History:   Diagnosis Date    H/O Pneumonia     History of diverticulitis     Insomnia     Iron deficiency anemia 2019    Steatosis, liver     Vitamin D deficiency      Past Surgical History:   Procedure Laterality Date    BREAST BIOPSY Right     BREAST RECONSTRUCTION Bilateral 2023    Procedure: BREAST RECONSTRUCTION, bilateral breast reconstruction with implant, mesh, use of spy;  Surgeon: Maryse Wilkinson MD;  Location: [facility];  Service: Plastics;  Laterality: Bilateral;    BREAST RECONSTRUCTION Bilateral 2023    Procedure: Bilateral Breast reconstruction revision, with bilateral  capsulectomy, and bilateral nipple reconstruction;  Surgeon: Maryse Wilkinson MD;  [place] [facility] [place] [facility];  Service: Plastics;  Laterality: Bilateral;     SECTION      CHOLECYSTECTOMY      COLONOSCOPY  10/28/2016    nl with Dr. Everett Botello    COLONOSCOPY N/A 2023    Procedure: COLONOSCOPY FOR SCREENING;  Surgeon: Dakota Nowak MD;  [place] [facility];  Service: Gastroenterology;  Laterality: N/A;  Diverticulosis, Hemorrhoids    ENDOSCOPY N/A 05/10/2021    Procedure: ESOPHAGOGASTRODUODENOSCOPY WITH BIOPSY;  Surgeon: Dakota Nowak MD;  [place] [facility];  Service: Gastroenterology;  Laterality: N/A;  GASTRIC POLYPS, SMALL HIATAL HERNIA, ESOPHAGITIS    FAT GRAFTING Bilateral 2023    Procedure: FAT GRAFTING;  Surgeon: Maryse Wilkinson MD;  Location: [facility];  Service: Plastics;  Laterality: Bilateral;    MASTECTOMY W/ SENTINEL NODE BIOPSY Right 2023    Procedure: bilateral breast skin sparing mastectomy with right sentinel lymph node biopsy.;  Surgeon: Miladis Mccormack MD;  Location: [facility] MAIN OR;  Service: General;  Laterality: Right;    UPPER GASTROINTESTINAL ENDOSCOPY       Social History     Occupational History    Not on file   Tobacco Use    Smoking status: Never     Passive exposure: Never    Smokeless tobacco: Never   Vaping Use    Vaping status: Never Used   Substance and Sexual Activity    Alcohol use: Yes     Alcohol/week: 3.0 standard drinks of alcohol     Types: 3 Glasses of wine per week     Comment: social    Drug use: Never    Sexual activity: Yes     Partners: Male     Birth control/protection: Post-menopausal, Tubal ligation          Allergies:   Allergies   Allergen Reactions    Penicillins Other (See Comments)     Childhood allergy       Medications:   Home Medications:  Current Outpatient Medications on File Prior to Visit   Medication Sig    amLODIPine (NORVASC) 5 MG tablet Take 1 tablet by mouth once daily    BD Veo  "Insulin Syringe U/F 31G X 15/64\" 0.3 ML misc USE WITH PEPTID ADMINISTRATION    buPROPion XL (WELLBUTRIN XL) 300 MG 24 hr tablet Take 1 tablet by mouth Daily.    Omega-3 Fatty Acids (fish oil) 1000 MG capsule capsule Take  by mouth Daily With Breakfast.    traZODone (DESYREL) 50 MG tablet TAKE 1 TABLET BY MOUTH ONCE DAILY AT NIGHT    vitamin B-12 (CYANOCOBALAMIN) 500 MCG tablet Take 1 tablet by mouth Daily.     No current facility-administered medications on file prior to visit.         ROS:  14 point review of systems was negative except as listed in the HPI.    Physical Exam:   58 y.o. female  Body mass index is 26.16 kg/m²., 73.5 kg (162 lb)  Vitals:    04/02/24 1325   Temp: 98.4 °F (36.9 °C)     General: Alert, cooperative, appears well and in no observable distress. Appears stated age and BMI as listed above.  HEENT: Normocephalic, atraumatic on external visual inspection.  CV: No significant peripheral edema.  Respiratory: Normal respiratory effort.  Skin: Warm & well perfused; appropriate skin turgor.  Psych: Appropriate mood & affect.  Neuro: Gross sensation and motor intact in affected extremity/extremities.  Vascular: Peripheral pulses palpable in affected extremity/extremities.     MSK Exam:  Physical exam is limited due to the acute nature of her fracture.  There is significant bruising along the anterior and medial aspect of the upper arm.  Skin is intact, sensation intact to light touch.  She has full range of motion of the elbow wrist and digits but her elbow motion is a little bit guarded.  Shoulder range of motion and strength was not assessed due to her acute fracture.  Pulses are intact, skin is warm and well-perfused distally.    Radiology:    The following X-rays were ordered/reviewed today to evaluate the patient's symptoms: 2 views of the left shoulder suggest a comminuted and moderately displaced fracture of the greater tuberosity of the humerus.  Humeral head otherwise is well-seated , no " other obvious acute or chronic bony pathology.    Procedure:   N/A    Misc. Data/Labs: N/A    Assessment & Plan:    ICD-10-CM ICD-9-CM   1. Displaced fracture of greater tuberosity of left humerus, initial encounter for closed fracture  S42.252A 812.03   2. Pain  R52 780.96     No orders of the defined types were placed in this encounter.    Orders Placed This Encounter   Procedures    XR Shoulder 2+ View Left    CT humerus left wo contrast     This is a 58-year-old female with a fracture of her left proximal humerus at the greater tuberosity.  This occurred on March 24, 2024.  I discussed the imaging with Dr. Mera and plan is to proceed with a stat CT scan of the left humerus with 3D reconstruction to better evaluate for stability.  We are hopeful that we might be able to treat this conservatively we need a CT scan to better guide her care.    She will continue in her sling, we talked about no active or passive range of motion of the shoulder.  She can remove the sling to dangle the arm and can move her elbow wrist and digits to prevent stiffness.  She will continue to manage her symptoms with ice rest positioning and over-the-counter medications.  She did have a prescription for Percocet but has not felt like she needed to take that as long as she is immobilized her symptoms are reasonably controlled.    We will plan to follow-up with her after we get the results of the CT scan to discuss next steps hopefully if this is able to be managed conservatively she could possibly still go on her trip of course if she were to need to have surgery she will have to discuss this with the attending physician.    Return in about 1 week (around 4/9/2024) for Recheck. If symptoms change call for sooner appt..    Patient encouraged to call with questions or concerns prior to follow up.  Recommend ICE and/or HEAT PRN as discussed.  Will discuss with attending physician as needed.  Consider additional referrals, work up and/or  advanced imaging as indicated or if patient fails to respond to conservative care.        Hank Glaser, APRN

## 2024-04-01 ENCOUNTER — OFFICE VISIT (OUTPATIENT)
Dept: PLASTIC SURGERY | Facility: CLINIC | Age: 59
End: 2024-04-01
Payer: COMMERCIAL

## 2024-04-01 VITALS
SYSTOLIC BLOOD PRESSURE: 148 MMHG | TEMPERATURE: 98.9 F | HEART RATE: 64 BPM | BODY MASS INDEX: 26 KG/M2 | WEIGHT: 161.8 LBS | DIASTOLIC BLOOD PRESSURE: 97 MMHG | HEIGHT: 66 IN | OXYGEN SATURATION: 98 %

## 2024-04-01 DIAGNOSIS — Z98.890 S/P BREAST RECONSTRUCTION, BILATERAL: Primary | ICD-10-CM

## 2024-04-01 PROCEDURE — 99213 OFFICE O/P EST LOW 20 MIN: CPT | Performed by: NURSE PRACTITIONER

## 2024-04-02 ENCOUNTER — OFFICE VISIT (OUTPATIENT)
Dept: ORTHOPEDIC SURGERY | Facility: CLINIC | Age: 59
End: 2024-04-02
Payer: COMMERCIAL

## 2024-04-02 ENCOUNTER — HOSPITAL ENCOUNTER (OUTPATIENT)
Dept: CT IMAGING | Facility: HOSPITAL | Age: 59
Discharge: HOME | End: 2024-04-02
Admitting: NURSE PRACTITIONER
Payer: COMMERCIAL

## 2024-04-02 VITALS — BODY MASS INDEX: 26.03 KG/M2 | HEIGHT: 66 IN | WEIGHT: 162 LBS | TEMPERATURE: 98.4 F

## 2024-04-02 DIAGNOSIS — S42.252A DISPLACED FRACTURE OF GREATER TUBEROSITY OF LEFT HUMERUS, INITIAL ENCOUNTER FOR CLOSED FRACTURE: ICD-10-CM

## 2024-04-02 DIAGNOSIS — S42.252A DISPLACED FRACTURE OF GREATER TUBEROSITY OF LEFT HUMERUS, INITIAL ENCOUNTER FOR CLOSED FRACTURE: Primary | ICD-10-CM

## 2024-04-02 DIAGNOSIS — R52 PAIN: ICD-10-CM

## 2024-04-02 PROCEDURE — 73030 X-RAY EXAM OF SHOULDER: CPT | Performed by: NURSE PRACTITIONER

## 2024-04-02 PROCEDURE — 99214 OFFICE O/P EST MOD 30 MIN: CPT | Performed by: NURSE PRACTITIONER

## 2024-04-02 PROCEDURE — 73200 CT UPPER EXTREMITY W/O DYE: CPT

## 2024-04-05 ENCOUNTER — PATIENT ROUNDING (BHMG ONLY) (OUTPATIENT)
Dept: ORTHOPEDIC SURGERY | Facility: CLINIC | Age: 59
End: 2024-04-05
Payer: COMMERCIAL

## 2024-04-05 NOTE — PROGRESS NOTES
A [facility] Message has been sent to the patient for PATIENT ROUNDING with [facility]

## 2024-04-11 ENCOUNTER — TELEPHONE (OUTPATIENT)
Dept: ORTHOPEDIC SURGERY | Facility: CLINIC | Age: 59
End: 2024-04-11
Payer: COMMERCIAL

## 2024-04-15 ENCOUNTER — PATIENT ROUNDING (BHMG ONLY) (OUTPATIENT)
Dept: URGENT CARE | Facility: CLINIC | Age: 59
End: 2024-04-15
Payer: COMMERCIAL

## 2024-04-15 NOTE — PROGRESS NOTES
[facility]              Follow Up      Patient Name: Mariela Polanco  : 1965  Primary Care Physician: Kuldeep Robles MD        Chief Complaint:  Left shoulder pain     HPI:   Mariela Polanco is a 58 y.o. year old who presents today for evaluation. Patient reports an injury of her left shoulder in the [place] airport.  She was pulling her suitcase behind her moving sidewalk was ending and got caught up, she tripped and essentially landed on the left shoulder.  She heard and felt a pop with immediate severe pain on  3/24/2024.    We sent her for a CT scan and Dr. Mera felt this would be appropriate to continue conservative management. She is here today for follow up x-rays. She reports her pain is well controlled as long as she keeps it immobile and in the sling. She is right-hand dominant, she works full-time still does a lot of computer work.     History is significant for double mastectomy last year, she just recently regained the full use of both of her upper extremities.     Past Medical/Surgical, Social and Family History:  I have reviewed and/or updated pertinent history as noted in the medical record including:  Past Medical History:   Diagnosis Date    H/O Pneumonia     History of diverticulitis     Insomnia     Iron deficiency anemia 2019    Steatosis, liver     Vitamin D deficiency      Past Surgical History:   Procedure Laterality Date    BREAST BIOPSY Right     BREAST RECONSTRUCTION Bilateral 2023    Procedure: BREAST RECONSTRUCTION, bilateral breast reconstruction with implant, mesh, use of spy;  Surgeon: Maryse Wilkinson MD;  Location: [facility];  Service: Plastics;  Laterality: Bilateral;    BREAST RECONSTRUCTION Bilateral 2023    Procedure: Bilateral Breast reconstruction revision, with bilateral capsulectomy, and bilateral nipple reconstruction;  Surgeon: Maryse Wilkinson MD;  [facility]: [facility];  Service: Plastics;  Laterality:  "Bilateral;     SECTION      CHOLECYSTECTOMY      COLONOSCOPY  10/28/2016    nl with Dr. Everett Botello    COLONOSCOPY N/A 2023    Procedure: COLONOSCOPY FOR SCREENING;  Surgeon: Dakota Nowak MD;  Location: [facility];  Service: Gastroenterology;  Laterality: N/A;  Diverticulosis, Hemorrhoids    ENDOSCOPY N/A 05/10/2021    Procedure: ESOPHAGOGASTRODUODENOSCOPY WITH BIOPSY;  Surgeon: Dakota Nowak MD;  Location: [facility];  Service: Gastroenterology;  Laterality: N/A;  GASTRIC POLYPS, SMALL HIATAL HERNIA, ESOPHAGITIS    FAT GRAFTING Bilateral 2023    Procedure: FAT GRAFTING;  Surgeon: Maryse Wilkinson MD;  [facility]: [facility];  Service: Plastics;  Laterality: Bilateral;    MASTECTOMY W/ SENTINEL NODE BIOPSY Right 2023    Procedure: bilateral breast skin sparing mastectomy with right sentinel lymph node biopsy.;  Surgeon: Miladis Mccormack MD;  Location: [facility] OR;  Service: General;  Laterality: Right;    UPPER GASTROINTESTINAL ENDOSCOPY       Social History     Occupational History    Not on file   Tobacco Use    Smoking status: Never     Passive exposure: Never    Smokeless tobacco: Never   Vaping Use    Vaping status: Never Used   Substance and Sexual Activity    Alcohol use: Yes     Alcohol/week: 3.0 standard drinks of alcohol     Types: 3 Glasses of wine per week     Comment: social    Drug use: Never    Sexual activity: Yes     Partners: Male     Birth control/protection: Post-menopausal, Tubal ligation          Allergies:   Allergies   Allergen Reactions    Penicillins Other (See Comments)     Childhood allergy       Medications:   Home Medications:  Current Outpatient Medications on File Prior to Visit   Medication Sig    amLODIPine (NORVASC) 5 MG tablet Take 1 tablet by mouth once daily    BD Veo Insulin Syringe U/F 31G X 15/64\" 0.3 ML misc USE WITH PEPTID ADMINISTRATION    benzonatate (TESSALON) 200 MG capsule Take 1 capsule by mouth 3 (Three) " Times a Day As Needed for Cough.    buPROPion XL (WELLBUTRIN XL) 300 MG 24 hr tablet Take 1 tablet by mouth Daily.    fluticasone (FLONASE) 50 MCG/ACT nasal spray 2 sprays into the nostril(s) as directed by provider Daily.    Omega-3 Fatty Acids (fish oil) 1000 MG capsule capsule Take  by mouth Daily With Breakfast.    traZODone (DESYREL) 50 MG tablet TAKE 1 TABLET BY MOUTH ONCE DAILY AT NIGHT    vitamin B-12 (CYANOCOBALAMIN) 500 MCG tablet Take 1 tablet by mouth Daily.     No current facility-administered medications on file prior to visit.         ROS:  ROS negative except as listed in the HPI.    Physical Exam:   58 y.o. female  Body mass index is 25.89 kg/m²., 72.8 kg (160 lb 6.4 oz)  Vitals:    04/16/24 1339   Temp: 98 °F (36.7 °C)     General: Alert, cooperative, appears well and in no observable distress. Appears stated age and BMI as listed above.  HEENT: Normocephalic, atraumatic on external visual inspection.  CV: No significant peripheral edema.  Respiratory: Normal respiratory effort.  Skin: Warm & well perfused; appropriate skin turgor.  Psych: Appropriate mood & affect.  Neuro: Gross sensation and motor intact in affected extremity/extremities.  Vascular: Peripheral pulses palpable in affected extremity/extremities.     MSK Exam:  Physical exam is limited due to the acute nature of her fracture. There is significant bruising along the anterior and medial aspect of the upper arm. Skin is intact, sensation intact to light touch. She has full range of motion of the elbow wrist and digits but her elbow motion is a little bit guarded. Shoulder range of motion and strength was not assessed due to her acute fracture. Pulses are intact, skin is warm and well-perfused distally.      Radiology:    2 views of the left shoulder suggest a comminuted and moderately displaced fracture of the greater tuberosity of the humerus. Humeral head otherwise is well-seated , no other obvious acute or chronic bony pathology. No  significant interval healing in the interim.    We also reviewed her CT scan.     Procedure:   N/A      Misc. Data/Labs: N/A    Assessment & Plan:    ICD-10-CM ICD-9-CM   1. Other closed displaced fracture of proximal end of left humerus with routine healing, subsequent encounter  S42.292D V54.11     No orders of the defined types were placed in this encounter.    Orders Placed This Encounter   Procedures    XR Humerus Left     Continue in the sling. No AROM or PROM of the shoulder. She may remove for pendulums and elbow ROM. Suggested topical arnica for the bruising. Con't OTCs as needed, ice etc. Will see her back in 2 weeks with new x-rays.     Return in about 2 weeks (around 4/30/2024) for Recheck. If symptoms change call for sooner appt..    Patient encouraged to call with questions or concerns prior to follow up.  Recommend ICE and/or HEAT PRN as discussed.  Will discuss with attending physician as needed.  Consider additional referrals, work up and/or advanced imaging as indicated or if patient fails to respond to conservative care.        ARTURO Hudson      Dictated Utilizing Dragon Dictation

## 2024-04-15 NOTE — ED NOTES
Thank you for letting us care for you during your recent visit at [facility]. We would love to hear about your experience with us.     We’re always looking for ways to make our patients’ experiences even better. Do you have any recommendations on ways we may improve?    I appreciate you taking the time to respond. Please be on the lookout for a survey about your recent visit from [facility] via text or email. We would greatly appreciate if you could fill that out and turn it back in. We want your voice to be heard and we value your feedback.     Thank you for choosing [facility] for your healthcare needs.

## 2024-04-16 ENCOUNTER — OFFICE VISIT (OUTPATIENT)
Dept: ORTHOPEDIC SURGERY | Facility: CLINIC | Age: 59
End: 2024-04-16
Payer: COMMERCIAL

## 2024-04-16 VITALS — HEIGHT: 66 IN | TEMPERATURE: 98 F | BODY MASS INDEX: 25.78 KG/M2 | WEIGHT: 160.4 LBS

## 2024-04-16 DIAGNOSIS — S42.292D OTHER CLOSED DISPLACED FRACTURE OF PROXIMAL END OF LEFT HUMERUS WITH ROUTINE HEALING, SUBSEQUENT ENCOUNTER: Primary | ICD-10-CM

## 2024-05-01 NOTE — PROGRESS NOTES
[facility]              Follow Up      Patient Name: Mariela Polanco  : 1965  Primary Care Physician: Kuldeep Robles MD        Chief Complaint:  Left shoulder fracture    HPI:   Mariela Polanco is a 58 y.o. year old who presents today for evaluation.  Patient reports an injury of her left shoulder in the [place] airport.  She was pulling her suitcase behind her moving sidewalk was ending and got caught up, she tripped and essentially landed on the left shoulder.  She heard and felt a pop with immediate severe pain on  3/24/2024.     We have been treating this conservatively and she is improving. She continues to wear her sling, minding all of the restrictions. She does remove the sling at home for gentle elbow and wrist ROM. She is not lifting anything.     History is significant for double mastectomy last year, she just recently regained the full use of both of her upper extremities.       Past Medical/Surgical, Social and Family History:  I have reviewed and/or updated pertinent history as noted in the medical record including:  Past Medical History:   Diagnosis Date    H/O Pneumonia     History of diverticulitis     Insomnia     Iron deficiency anemia 2019    Steatosis, liver     Vitamin D deficiency      Past Surgical History:   Procedure Laterality Date    BREAST BIOPSY Right     BREAST RECONSTRUCTION Bilateral 2023    Procedure: BREAST RECONSTRUCTION, bilateral breast reconstruction with implant, mesh, use of spy;  Surgeon: Maryse Wilkinson MD;  Location: [facility];  Service: Plastics;  Laterality: Bilateral;    BREAST RECONSTRUCTION Bilateral 2023    Procedure: Bilateral Breast reconstruction revision, with bilateral capsulectomy, and bilateral nipple reconstruction;  Surgeon: Maryse Wilkinson MD;  Location: [facility];  Service: Plastics;  Laterality: Bilateral;     SECTION      CHOLECYSTECTOMY      COLONOSCOPY  10/28/2016    nl with  "Dr. Everett Botello    COLONOSCOPY N/A 02/06/2023    Procedure: COLONOSCOPY FOR SCREENING;  Surgeon: Dakota Nowak MD;  [place]: [facility];  Service: Gastroenterology;  Laterality: N/A;  Diverticulosis, Hemorrhoids    ENDOSCOPY N/A 05/10/2021    Procedure: ESOPHAGOGASTRODUODENOSCOPY WITH BIOPSY;  Surgeon: Dakota Nowak MD;  Location: [facility];  Service: Gastroenterology;  Laterality: N/A;  GASTRIC POLYPS, SMALL HIATAL HERNIA, ESOPHAGITIS    FAT GRAFTING Bilateral 12/26/2023    Procedure: FAT GRAFTING;  Surgeon: Maryse Wilkinson MD;  [place] [facility]  Service: Plastics;  Laterality: Bilateral;    MASTECTOMY W/ SENTINEL NODE BIOPSY Right 4/24/2023    Procedure: bilateral breast skin sparing mastectomy with right sentinel lymph node biopsy.;  Surgeon: Miladis Mccormack MD;  Location: [facility] MA[facility] OR;  Service: General;  Laterality: Right;    UPPER GASTROINTESTINAL ENDOSCOPY       Social History     Occupational History    Not on file   Tobacco Use    Smoking status: Never     Passive exposure: Never    Smokeless tobacco: Never   Vaping Use    Vaping status: Never Used   Substance and Sexual Activity    Alcohol use: Yes     Alcohol/week: 3.0 standard drinks of alcohol     Types: 3 Glasses of wine per week     Comment: social    Drug use: Never    Sexual activity: Yes     Partners: Male     Birth control/protection: Post-menopausal, Tubal ligation          Allergies:   Allergies   Allergen Reactions    Penicillins Other (See Comments)     Childhood allergy       Medications:   Home Medications:  Current Outpatient Medications on File Prior to Visit   Medication Sig    amLODIPine (NORVASC) 5 MG tablet Take 1 tablet by mouth once daily    BD Veo Insulin Syringe U/F 31G X 15/64\" 0.3 ML misc USE WITH PEPTID ADMINISTRATION    benzonatate (TESSALON) 200 MG capsule Take 1 capsule by mouth 3 (Three) Times a Day As Needed for Cough.    buPROPion XL (WELLBUTRIN XL) 300 MG 24 hr tablet Take 1 " tablet by mouth Daily.    fluticasone (FLONASE) 50 MCG/ACT nasal spray 2 sprays into the nostril(s) as directed by provider Daily.    Omega-3 Fatty Acids (fish oil) 1000 MG capsule capsule Take  by mouth Daily With Breakfast.    traZODone (DESYREL) 50 MG tablet TAKE 1 TABLET BY MOUTH ONCE DAILY AT NIGHT    vitamin B-12 (CYANOCOBALAMIN) 500 MCG tablet Take 1 tablet by mouth Daily.     No current facility-administered medications on file prior to visit.         ROS:  ROS negative except as listed in the HPI.    Physical Exam:   58 y.o. female  Body mass index is 25.47 kg/m²., 71.6 kg (157 lb 12.8 oz)  Vitals:    05/02/24 1320   Temp: 98.7 °F (37.1 °C)     General: Alert, cooperative, appears well and in no observable distress. Appears stated age and BMI as listed above.  HEENT: Normocephalic, atraumatic on external visual inspection.  CV: No significant peripheral edema.  Respiratory: Normal respiratory effort.  Skin: Warm & well perfused; appropriate skin turgor.  Psych: Appropriate mood & affect.  Neuro: Gross sensation and motor intact in affected extremity/extremities.  Vascular: Peripheral pulses palpable in affected extremity/extremities.     MSK Exam:  Left shoulder exam: There is resolving bruising along the anterior and medial aspect of the upper arm. Skin is intact, sensation intact to light touch. She has full range of motion of the elbow wrist and digits. She tolerates some gentle passive ROM in the shoulder, I am able to FF her to about 90 degrees with pain and stiffness at terminal motion, ER to 30 degrees in adduction, IR AROM to the lateral hip. Strength is globally weak as expected. Pulses are intact distally, skin is warm and well-perfused distally.      Radiology:    The following X-rays were ordered/reviewed today to evaluate the patient's symptoms: 2 views of the left shoulder show evidence of bony consolidation and callous formation. Alignment remains reasonable and joint is congruent. This is  improved when compared with her films from 2 weeks ago.    Procedure:   N/A      Misc. Data/Labs: N/A    Assessment & Plan:    ICD-10-CM ICD-9-CM   1. Other closed displaced fracture of proximal end of left humerus with routine healing, subsequent encounter  S42.292D V54.11     No orders of the defined types were placed in this encounter.    Orders Placed This Encounter   Procedures    XR Shoulder 2+ View Left    Ambulatory Referral to Physical Therapy for Evaluation & Treatment     This is a 57 y/o female with a healing L proximal humerus. I discussed her imaging with Dr. Mera and she is showing good signs of healing. Plan is to get her into some PT at this point now that she is almost 6 weeks out from the injury. She can come out of the sling. Begin some gentle weight bearing as tolerated. We will work with PT on recuperating her motion before working on strengthening.     Return in about 4 weeks (around 5/30/2024) for with new x-rays..    Patient encouraged to call with questions or concerns prior to follow up.  Recommend ICE and/or HEAT PRN as discussed.  Will discuss with attending physician as needed.  Consider additional referrals, work up and/or advanced imaging as indicated or if patient fails to respond to conservative care.        ARTURO Hudson      Dictated Utilizing Dragon Dictation

## 2024-05-02 ENCOUNTER — OFFICE VISIT (OUTPATIENT)
Dept: ORTHOPEDIC SURGERY | Facility: CLINIC | Age: 59
End: 2024-05-02
Payer: COMMERCIAL

## 2024-05-02 VITALS — TEMPERATURE: 98.7 F | BODY MASS INDEX: 25.36 KG/M2 | HEIGHT: 66 IN | WEIGHT: 157.8 LBS

## 2024-05-02 DIAGNOSIS — S42.292D OTHER CLOSED DISPLACED FRACTURE OF PROXIMAL END OF LEFT HUMERUS WITH ROUTINE HEALING, SUBSEQUENT ENCOUNTER: Primary | ICD-10-CM

## 2024-05-19 NOTE — PROGRESS NOTES
General Surgery Breast Cancer History and Physical Exam     Summary:    Mariela Polanco is a 58 y.o. lady who presents with a history of right breast DCIS: Grade III,  ER+/MA+, Ki67 35%; [id number], Stage 0.      A multidisciplinary plan has been formulated for the patient:    (1) Breast Surgical Oncology:  -Invitae 9 panel genetic testing: negative.   -s/p bilateral breast skin sparing mastectomies with R SLN biopsy 4/2023.   -No longer following with lymphedema clinic.  -With me in 1 year for clinical breast exam.    (2) Medical Oncology:  -Seen by Dr. Castillo. No adjuvant therapy recommended.     Referring Provider: No ref. provider found    Chief Complaint: abnormal breast imaging    History of Present Illness: Ms. Mariela Polanco is a 58 y.o. year old lady, seen at the request of No ref. provider found for a new diagnosis of right breast cancer.      This was initially detected as an imaging abnormality. She has had annual mammograms each year. She denies any prior history of abnormal mammograms or breast biopsies. Her work-up is detailed in the oncologic history below.     She denies any breast lumps, pain, skin changes, or nipple discharge. She denies any family history of breast cancer. She believes her maternal aunt had ovarian cancer.      5/8/2023 She presents today for follow up. Her pain is controlled overall; she did injure her back yesterday and is having some soreness and discomfort. She is slowly getting back to her daily activities.    5/20/2024 She presents today for follow up.  She has no concerns or complaints.  She has not required any adjuvant therapy.  She did have a revision at the end of December that she is happy with.  She did recently fall and break her left shoulder.  This is nonsurgical and she is doing physical therapy.  She is up-to-date on her primary care visits, GYN, and colonoscopy.  She saw lymphedema clinic initially but is no longer seeing them as she is having no  issues.    Workup of Current Diagnosis:    2/14/2023 Bilateral Screening Mammogram:  IMPRESSION:  Cluster of calcifications in the right breast. Magnification views recommended. The left breast remains negative.   BI-RADS 0: Needs additional evaluation.    3/20/2023 Right Breast Diagnostic Mammogram:   FINDINGS:  There are scattered areas of fibroglandular density. Today's study includes spot magnification views in the CC and MLO projections. There is a cluster of pleomorphic calcifications in the central right breast. The cluster is  up to 9 mm maximum dimension. Otherwise breast is negative  IMPRESSION:  Suspicious cluster of calcifications central right breast.  BI-RADS CATEGORY 4: Suspicious abnormality. Stereotactic biopsy recommended    3/31/2023 Right Breast Stereotactic Biopsy:   TECHNIQUE: Timeout was performed. Informed consent was obtained. Patient placed in stereotactic device and a superior approach was chosen. After  images, sterile preparation local anesthesia was performed. A skin nick was made with a scalpel. Then the 9 gauge vacuum-assisted biopsy needle was introduced into the breast to the predetermined coordinates. Pre and posterior images were obtained. Around the clock sampling was performed. Specimen image showed numerous calcifications had been  removed. The needle was removed and a marker clip was placed.    Postprocedure mammogram showed the clip in good position adjacent to the remaining calcifications. Posterior specimen image showed numerous calcifications in well 3 6 and 12  IMPRESSION:  Successful stereotactic biopsy right breast with removal of many of the calcifications. Pathology report is pending    3/31/2023 Pathology:   Final Diagnosis   1. Breast, Right, Core Biopsy:                A. Ductal carcinoma in-situ (DCIS), high nuclear grade with solid and cribriform architecture, central comedo type                   necrosis and microcalcifications measuring up to 5 mm  maximally and involving three core fragments.     4/14/2023 Bilateral Breast MRI   IMPRESSION:  1. Biopsy-proven malignancy in the right breast at the 12-o'clock position represented by the top hat-shaped metallic clip which is at the anterior margin of an irregular mass that measures 1.1 cm in greatest dimension. The mass corresponds to the residual microcalcifications seen on the post biopsy mammogram of 03/31/2023. No other suspicious findings are seen within the right breast and there is no evidence for right axillary adenopathy.  2. There are no findings suspicious for malignancy in the left breast.  BI-RADS category 6: Known biopsy-proven malignancy.    4/24/2023 bilateral  breast skin sparing mastectomy with right sentinel lymph node biopsy   Final Diagnosis   1. Left Breast, Oriented Simple Skin Sparing Mastectomy (840 grams):                A. Benign breast tissue with fibrocystic change, clustered apocrine cysts and ductal hyperplasia of usual type.               B. Unremarkable skin and nipple.               C. No atypical hyperplasia, in-situ nor invasive carcinoma identified.                 2. Right Breast, Oriented Simple Skin Sparing Mastectomy (794 grams): HIGH-GRADE DUCTAL CARCINOMA IN-SITU       (DCIS).               A. Tumor site: 12 o'clock.               B. Size (extent) of DCIS: 20 mm.               C. Architectural patterns: Solid and comedo.                D. Nuclear grade: High.               E. Margins: Uninvolved by DCIS.                            1. DCIS comes to within 13 mm of the anterior margin, 20 mm from the posterior margin, 80 mm from the                    lateral margin, 90 mm from superior margin, 100 mm from the inferior margin and 110 mm from the       medial margin of resection.               F. Overlying skin and nipple are uninvolved by DCIS.               G. No evidence of invasive carcinoma identified.               H. Lymph nodes: Two sentinel lymph nodes, negative  "for metastatic carcinoma (0/2).               I. Hormone receptor status: ER % positive, OH 51-60% positive, Ki-67 35% (performed on biopsy      [id number], slides reviewed).  J. Pathologic stage: pTis, N(sn)0.     3. [place] Lymph Node #1, Right Axilla, Excision:                A. One lymph node, negative for metastatic carcinoma (0/1).     4. [place] Lymph Node #2, Right Axilla, Excision:                A. One lymph node, negative for metastatic carcinoma (0/1).     5. Skin, Right Inferior Breast, Excision:                A. Benign unremarkable skin and breast tissue.     6. Skin, Left Inferior Breast, Excision:               A. Benign unremarkable skin and breast tissue.     Past Medical History:   HTN   GERD    Past Surgical History:    C section   Cholecystectomy   EGD and colonoscopy    Family History:    As above    Social History:  Denies tobacco use  Occasional alcohol use    Allergies:   Allergies   Allergen Reactions    Penicillins Other (See Comments)     Childhood allergy     Medications:     Current Outpatient Medications:     amLODIPine (NORVASC) 5 MG tablet, Take 1 tablet by mouth once daily, Disp: 90 tablet, Rfl: 0    BD Veo Insulin Syringe U/F 31G X 15/64\" 0.3 ML misc, USE WITH PEPTID ADMINISTRATION, Disp: , Rfl:     benzonatate (TESSALON) 200 MG capsule, Take 1 capsule by mouth 3 (Three) Times a Day As Needed for Cough., Disp: 21 capsule, Rfl: 0    buPROPion XL (WELLBUTRIN XL) 300 MG 24 hr tablet, Take 1 tablet by mouth Daily., Disp: 90 tablet, Rfl: 1    fluticasone (FLONASE) 50 MCG/ACT nasal spray, 2 sprays into the nostril(s) as directed by provider Daily., Disp: 11.1 mL, Rfl: 0    Omega-3 Fatty Acids (fish oil) 1000 MG capsule capsule, Take  by mouth Daily With Breakfast., Disp: , Rfl:     traZODone (DESYREL) 50 MG tablet, TAKE 1 TABLET BY MOUTH ONCE DAILY AT NIGHT, Disp: 90 tablet, Rfl: 0    vitamin B-12 (CYANOCOBALAMIN) 500 MCG tablet, Take 1 tablet by mouth Daily., Disp: , Rfl: "     Laboratory Values:    Labs from 3/8/2024 reviewed    Review of Systems:   Influenza-like illness: no fever, no  cough, no  sore throat, no  body aches, no loss of sense of taste or smell, no known exposure to person with Covid-19.  Constitutional: Negative for fevers or chills  HENT: Negative for hearing loss or runny nose  Eyes: Negative for vision changes or scleral icterus  Respiratory: Negative for cough or shortness of breath  Cardiovascular: Negative for chest pain or heart palpitations  Gastrointestinal: Negative for abdominal pain, nausea, vomiting, constipation, melena, or hematochezia  Genitourinary: Negative for hematuria or dysuria  Musculoskeletal: Negative for joint swelling or gait instability  Neurologic: Negative for tremors or seizures  Psychiatric: Negative for suicidal ideations or depression  All other systems reviewed and negative    Physical Exam:   ECO - Asymptomatic  Constitutional: Well-developed well-nourished, no acute distress  Eyes: Conjunctiva normal, sclera nonicteric  ENMT: Hearing grossly normal, oral mucosa moist  Neck: Supple, no palpable mass, trachea midline  Respiratory: Clear to auscultation, normal inspiratory effort  Cardiovascular: Regular rate, no peripheral edema, no jugular venous distention  Breast: symmetric  Bilateral breast incisions well-healed, no masses, no skin changes, bilateral implants in place  No clinical chest wall involvement.  Gastrointestinal: Soft, nontender  Lymphatics (palpable nodes): No cervical, supraclavicular or axillary lymphadenopathy  Skin:  Warm, dry, no rash on visualized skin surfaces  Musculoskeletal: Symmetric strength, normal gait  Psychiatric: Alert and oriented ×3, normal affect     HUSSIAN ROBINS M.D.  General and Endoscopic Surgery  [facility]    [address] [place] [address], [address]  [place], [place], [address]  P: [phone]  F: [phone]

## 2024-05-20 ENCOUNTER — OFFICE VISIT (OUTPATIENT)
Dept: SURGERY | Facility: CLINIC | Age: 59
End: 2024-05-20
Payer: COMMERCIAL

## 2024-05-20 VITALS — BODY MASS INDEX: 26.52 KG/M2 | WEIGHT: 165 LBS | HEIGHT: 66 IN

## 2024-05-20 DIAGNOSIS — Z85.3 HISTORY OF BREAST CANCER: Primary | ICD-10-CM

## 2024-05-20 PROCEDURE — 99212 OFFICE O/P EST SF 10 MIN: CPT | Performed by: STUDENT IN AN ORGANIZED HEALTH CARE EDUCATION/TRAINING PROGRAM

## 2024-05-22 ENCOUNTER — TREATMENT (OUTPATIENT)
Dept: PHYSICAL THERAPY | Facility: CLINIC | Age: 59
End: 2024-05-22
Payer: COMMERCIAL

## 2024-05-22 DIAGNOSIS — S42.292S HUMERAL HEAD FRACTURE, LEFT, SEQUELA: ICD-10-CM

## 2024-05-22 DIAGNOSIS — M25.512 ACUTE PAIN OF LEFT SHOULDER: Primary | ICD-10-CM

## 2024-05-22 DIAGNOSIS — R29.898 WEAKNESS OF LEFT SHOULDER: ICD-10-CM

## 2024-05-22 DIAGNOSIS — M25.612 DECREASED ROM OF LEFT SHOULDER: ICD-10-CM

## 2024-05-22 PROCEDURE — 97161 PT EVAL LOW COMPLEX 20 MIN: CPT | Performed by: PHYSICAL THERAPIST

## 2024-05-22 PROCEDURE — 97530 THERAPEUTIC ACTIVITIES: CPT | Performed by: PHYSICAL THERAPIST

## 2024-05-22 PROCEDURE — 97110 THERAPEUTIC EXERCISES: CPT | Performed by: PHYSICAL THERAPIST

## 2024-05-22 NOTE — PROGRESS NOTES
Physical Therapy Initial Evaluation and Plan of Care    [address] [facility] [address]      Patient: Mariela Polanco   : 1965  Diagnosis/ICD-10 Code:  Acute pain of left shoulder [M25.512]  Referring practitioner: ARTURO Bernardo  Date of Initial Visit: 2024  Today's Date: 2024  Patient seen for 1 sessions           Subjective Questionnaire: QuickDASH: 31.82      Subjective Evaluation    History of Present Illness  Date of onset: 3/24/2024  Mechanism of injury: Mariela Polanco is a 58 y.o. year old who presents today for evaluation.  Patient reports an injury of her left shoulder in the [place] airport.  She was pulling her suitcase behind her moving sidewalk was ending and got caught up, she tripped and essentially landed on the left shoulder.  She heard and felt a pop with immediate severe pain.  Pain shooting down from shoulder and hand pain. Sling on for 5-6 weeks.         She went on to her final destination and [facility] and was evaluated in the emergency room.  They discovered displaced fracture in the shoulder and stabilized her in a sling.   She is right-hand dominant, she works full-time still does a lot of computer work.  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        History is significant for double mastectomy last year, she just recently regained the full use of both of her upper extremities, anemia, liver disease    Hobbies: cooking, lifting, cleaning, folding clothes        Patient Occupation: consultant Quality of life: excellent    Pain  Current pain rating: 3  Location: L shoulder  Quality: dull ache, sharp, tight and discomfort  Alleviating factors: advil as needed.  Aggravating factors: sleeping, outstretched reach, overhead activity, lifting and movement  Progression: improved    Social Support  Lives with: spouse and young children    Hand dominance: right    Diagnostic Tests  CT  scan: abnormal (Mildly impacted comminuted fracture of the left humeral neck.)    Treatments  Previous treatment: physical therapy  Patient Goals  Patient goals for therapy: decreased pain, improved balance, increased motion, increased strength, independence with ADLs/IADLs and return to sport/leisure activities             Objective          Postural Observations  Seated posture: good  Standing posture: good      Observations   Left Shoulder   Negative for effusion and incision.       Palpation   Left   Hypertonic in the levator scapulae, pectoralis major, pectoralis minor and upper trapezius.   Tenderness of the anterior deltoid, lower trapezius, posterior deltoid, supraspinatus, teres major, teres minor and triceps.     Right   Hypertonic in the levator scapulae, pectoralis major, pectoralis minor and upper trapezius.     Neurological Testing     Sensation     Shoulder   Left Shoulder   Intact: light touch    Right Shoulder   Intact: Light touch    Additional Neurological Details  Pt denies numbness/tingling in LUE, occasional shooting pain down to hand    Active Range of Motion   Cervical/Thoracic Spine   Cervical    Flexion: 60 degrees   Extension: 40 degrees   Left lateral flexion: 30 degrees   Right lateral flexion: 30 degrees   Left rotation: 70 degrees   Right rotation: 80 degrees   Left Shoulder   Flexion: 100 degrees with pain  Abduction: 85 degrees   External rotation 0°: 42 degrees   External rotation BTH: Active external rotation behind the head: occiput. with pain  Internal rotation BTB: Active internal rotation behind the back: ipsilateral back pocket. with pain    Left Elbow   Flexion: WFL and with pain  Extension: WFL and with pain    Passive Range of Motion   Left Shoulder   Flexion: 155 degrees with pain  Abduction: 100 degrees with pain  External rotation 45°: 45 degrees with pain    Scapular Mobility   Left Shoulder   Scapular mobility: fair    Joint Play   Left Shoulder  Hypomobile in the  anterior capsule, posterior capsule and inferior capsule.    Strength/Myotome Testing     Left Shoulder     Planes of Motion   Flexion: 2+   Abduction: 2   External rotation at 0°: 3-   Internal rotation at 0°: 3-     Tests     Additional Tests Details  Deferred due to fx; negative Kailyn's sign          Assessment & Plan       Assessment  Impairments: abnormal muscle firing, abnormal muscle tone, abnormal or restricted ROM, activity intolerance, impaired physical strength, lacks appropriate home exercise program, pain with function and weight-bearing intolerance   Functional limitations: carrying objects, lifting, sleeping, pulling, pushing, uncomfortable because of pain, moving in bed, reaching behind back, reaching overhead and unable to perform repetitive tasks   Assessment details: Mariela Polanco is a 58 y.o. year-old female referred to physical therapy after sustaining a L humeral head fx from a fall. She presents with a evolving clinical presentation.  She has comorbidities recent double mascectomy and personal factors of frequent travel for work that may affect her progress in the plan of care.  Signs and symptoms are consistent with physical therapy diagnosis of impaired ROM of L shoulder, weakness and flexibility. Patient is appropriate for skilled physical therapy in order to reduce pain and increase ease with daily mobility. During evaluation, pt educated on anatomy, goal of interventions, sleeping position, and body mechanics to promote healthy lifestyle and improve quality of life.  Barriers to therapy: frequent travel for work  Prognosis: good    Goals  Plan Goals: ST days  1. Pt will demonstrate L shoulder PROM in flexion and abduction of >160 degrees to improve overhead activity  2. Pt will demonstrate L shoulder PROM in ER and IR (at 90 degrees ABD) to 80 degrees  3. Pt will be compliant with self stretches/HEP at home at least 3 days per week.  4. Decrease right shoulder pain to a 2/10 with  reaching and lifting into upper cabinets with LUE  5. Decrease LUE tenderness with palpation to minimal over the acromion and supraspinatus tendon to be able to sleep on her side    LT days  1. Pt will demonstrate L shoulder AROM WNL and minimal pain to be able to complete overhead reaching  2. Pt will be able to reach lumbar spine LUE to be able to complete dressing tasks  3. Pt will improve L shoulder strength to 4 to 4+/5 to be able to carry laundry basket and manage bag into overhead space during flights  4. Pt will improve QuickDASH score to 10 or less to improve subjective function of shoulder with ADLs      Plan  Therapy options: will be seen for skilled therapy services  Planned modality interventions: cryotherapy, electrical stimulation/Russian stimulation, iontophoresis, TENS, thermotherapy (hydrocollator packs), ultrasound and dry needling  Planned therapy interventions: ADL retraining, balance/weight-bearing training, body mechanics training, fine motor coordination training, flexibility, functional ROM exercises, home exercise program, IADL retraining, joint mobilization, manual therapy, neuromuscular re-education, postural training, soft tissue mobilization, spinal/joint mobilization, strengthening, stretching and therapeutic activities  Treatment plan discussed with: patient  Plan details: 2 times per week for 12 weeks        Visit Diagnoses:    ICD-10-CM ICD-9-CM   1. Acute pain of left shoulder  M25.512 719.41   2. Humeral head fracture, left, sequela  S42.292S 905.2   3. Decreased ROM of left shoulder  M25.612 719.51   4. Weakness of left shoulder  R29.898 781.99       Timed:  Manual Therapy:    -     mins  13535;  Therapeutic Exercise:    15     mins  01030;     Neuromuscular Carter:    -    mins  49291;    Therapeutic Activity:     10     mins  66910;     Gait Training:      -     mins  43777;     Ultrasound:     -     mins  26660;    Electrical Stimulation:    -     mins  75515 (  );    Low Eval                       20      Mins  07210  Mod Eval                        -     Mins  78300  High Eval                       -     Mins  19632    Untimed:  Electrical Stimulation:    -     mins  21338 ( );  Mechanical Traction:    -     mins  55108;     Timed Treatment:   45   mins   Total Treatment:     45   mins    PT SIGNATURE: TAMIE Yang license: [id number]  DATE TREATMENT INITIATED: 5/22/2024    Initial Certification  Certification Period: 8/20/2024  I certify that the therapy services are furnished while this patient is under my care.  The services outlined above are required by this patient, and will be reviewed every 90 days.     PHYSICIAN: Hank Glaser, APRN      DATE:     Please sign and return via fax to [phone]. Thank you, [facility].

## 2024-05-29 ENCOUNTER — TREATMENT (OUTPATIENT)
Dept: PHYSICAL THERAPY | Facility: CLINIC | Age: 59
End: 2024-05-29
Payer: COMMERCIAL

## 2024-05-29 DIAGNOSIS — M25.512 ACUTE PAIN OF LEFT SHOULDER: Primary | ICD-10-CM

## 2024-05-29 DIAGNOSIS — S42.292S HUMERAL HEAD FRACTURE, LEFT, SEQUELA: ICD-10-CM

## 2024-05-29 DIAGNOSIS — R29.898 WEAKNESS OF LEFT SHOULDER: ICD-10-CM

## 2024-05-29 DIAGNOSIS — M25.612 DECREASED ROM OF LEFT SHOULDER: ICD-10-CM

## 2024-05-29 NOTE — PROGRESS NOTES
Physical Therapy Daily Treatment Note      Patient: Mariela Polanco   : 1965  Referring practitioner: ARTURO Bernardo  Date of Initial Visit: Type: THERAPY  Noted: 2024  Today's Date: 2024  Patient seen for 2 sessions         Mariela Polanco reports: she has limited time due to a work meeting at 10 AM              Objective   PROM  Flexion: 156 degrees  ABD: 142 degrees  ER: at 45 degree ABD:  70 degrees    See Exercise, Manual, and Modality Logs for complete treatment.       Assessment/Plan  Completed L shoulder PROM for improving GH mobility and L shoulder ROM; pain at end ranges but improved since evaluation last week. Continue to focus on mobility with HEP. Plan to add scapular strengthening next session.        Progress per Plan of Care and Progress strengthening /stabilization /functional activity           Timed:  Manual Therapy:    15     mins  29340;  Therapeutic Exercise:    -     mins  69990;     Neuromuscular Carter:    -    mins  69770;    Therapeutic Activity:     -     mins  27608;     Gait Training:      -     mins  57557;     Ultrasound:     -     mins  43991;      Untimed:  Electrical Stimulation:    -     mins  64566 ( );  Mechanical Traction:    -     mins  79887;   Dry needling:      -     mins  57466/    Timed Treatment:   15   mins   Total Treatment:     25   mins  Paz Tse PT  Physical Therapist    License #: [id number]

## 2024-06-11 DIAGNOSIS — I10 ESSENTIAL HYPERTENSION: Chronic | ICD-10-CM

## 2024-06-11 DIAGNOSIS — G47.00 INSOMNIA, UNSPECIFIED TYPE: ICD-10-CM

## 2024-06-12 ENCOUNTER — TREATMENT (OUTPATIENT)
Dept: PHYSICAL THERAPY | Facility: CLINIC | Age: 59
End: 2024-06-12
Payer: COMMERCIAL

## 2024-06-12 DIAGNOSIS — R29.898 WEAKNESS OF LEFT SHOULDER: ICD-10-CM

## 2024-06-12 DIAGNOSIS — M25.612 DECREASED ROM OF LEFT SHOULDER: ICD-10-CM

## 2024-06-12 DIAGNOSIS — M25.512 ACUTE PAIN OF LEFT SHOULDER: Primary | ICD-10-CM

## 2024-06-12 DIAGNOSIS — S42.292S HUMERAL HEAD FRACTURE, LEFT, SEQUELA: ICD-10-CM

## 2024-06-12 RX ORDER — AMLODIPINE BESYLATE 5 MG/1
TABLET ORAL
Qty: 90 TABLET | Refills: 1 | Status: SHIPPED | OUTPATIENT
Start: 2024-06-12

## 2024-06-12 RX ORDER — TRAZODONE HYDROCHLORIDE 50 MG/1
TABLET ORAL
Qty: 90 TABLET | Refills: 1 | Status: SHIPPED | OUTPATIENT
Start: 2024-06-12

## 2024-06-12 NOTE — PROGRESS NOTES
Physical Therapy Daily Treatment Note      Patient: Mariela Polanco   : 1965  Referring practitioner: ARTURO Bernardo  Date of Initial Visit: Type: THERAPY  Noted: 2024  Today's Date: 2024  Patient seen for 3 sessions         Mariela Polanco reports: L shoulder pain is improving              Objective          Passive Range of Motion   Left Shoulder   Flexion: 172 degrees with pain  Abduction: 172 degrees with pain  External rotation 90°: 48 degrees with pain  Internal rotation 45°: 78 degrees with pain  Horizontal abduction: with pain  Horizontal adduction: with pain      See Exercise, Manual, and Modality Logs for complete treatment.       Assessment/Plan  Progressed to some strengthening with resistance bands; added pulleys for ROM. Some intermittent pain with ER and scaption, flexion is minimal pain. Sig improvement in PROM. Discussed following up with her doctor for repeat xrays to assess healing; prior to initiation of higher level exercises (yoga).        Progress per Plan of Care and Progress strengthening /stabilization /functional activity           Timed:  Manual Therapy:    15     mins  23513;  Therapeutic Exercise:    15     mins  11085;     Neuromuscular Carter:    -    mins  43999;    Therapeutic Activity:     -     mins  52133;     Gait Training:      -     mins  24732;     Ultrasound:     -     mins  60352;      Untimed:  Electrical Stimulation:    -     mins  60063 ( );  Mechanical Traction:    -     mins  45773;   Dry needling:      -     mins  59907/    Timed Treatment:   30   mins   Total Treatment:     40   mins  Paz Tse PT  Physical Therapist    License #: [id number]

## 2024-06-19 ENCOUNTER — TREATMENT (OUTPATIENT)
Dept: PHYSICAL THERAPY | Facility: CLINIC | Age: 59
End: 2024-06-19
Payer: COMMERCIAL

## 2024-06-19 DIAGNOSIS — M25.612 DECREASED ROM OF LEFT SHOULDER: ICD-10-CM

## 2024-06-19 DIAGNOSIS — R29.898 WEAKNESS OF LEFT SHOULDER: ICD-10-CM

## 2024-06-19 DIAGNOSIS — S42.292S HUMERAL HEAD FRACTURE, LEFT, SEQUELA: ICD-10-CM

## 2024-06-19 DIAGNOSIS — M25.512 ACUTE PAIN OF LEFT SHOULDER: Primary | ICD-10-CM

## 2024-06-19 NOTE — PROGRESS NOTES
Physical Therapy Daily Treatment Note      Patient: Mariela Polanco   : 1965  Referring practitioner: ARTURO Bernardo  Date of Initial Visit: Type: THERAPY  Noted: 2024  Today's Date: 2024  Patient seen for 4 sessions         Mariela Polanco reports: L shoulder doing better but still tight and painful with reaching out to the side.              Objective   See Exercise, Manual, and Modality Logs for complete treatment.       Assessment/Plan  Pt with improving L shoulder AROM and PROM; limited by pain with soft end feel especially with ABD. Most limited ABD and ER ROM. Ice after tx to prevent soreness. Plan to continue to progress ROM of L shoulder as tolerated; waiting for follow up xrays prior to progressing to a lot of strengthening.        Progress per Plan of Care and Progress strengthening /stabilization /functional activity           Timed:  Manual Therapy:    15     mins  51440;  Therapeutic Exercise:    15     mins  10854;     Neuromuscular Carter:    -    mins  11144;    Therapeutic Activity:     -     mins  97218;     Gait Training:      -     mins  51487;     Ultrasound:     -     mins  95749;      Untimed:  Electrical Stimulation:    -     mins  96732 ( );  Mechanical Traction:    -     mins  99226;   Dry needling:      -     mins  89026/31872    Timed Treatment:   30   mins   Total Treatment:     40   mins  Paz Tse PT  Physical Therapist    License #: [id number]

## 2024-06-24 NOTE — PROGRESS NOTES
"Post-op Follow-up, f/u on right breast redness, hematoma            History of Present Illness  Mariela Polanco is a 58 y.o. female who presents to [facility] PLASTIC & RECONSTRUCTIVE SURGERY as a post op follow up BREAST RECONSTRUCTION REVISION, Bilateral Breast reconstruction revision, with bilateral capsulectomy bilateral fat graft and bilateral nipple reconstruction 12/26/23.    Pt presents today for 6 month post op and follow up   Right breast higher and tight    Subjective      Penicillins  Allergies Reconciled.    Review of Systems   Constitutional: Negative.    HENT: Negative.     Eyes: Negative.    Respiratory: Negative.     Cardiovascular: Negative.    Gastrointestinal: Negative.    Endocrine: Negative.    Genitourinary: Negative.    Musculoskeletal:  Positive for arthralgias and myalgias.   Skin: Negative.         Right breast redness has improved    Allergic/Immunologic: Negative.    Neurological: Negative.    Hematological: Negative.    Psychiatric/Behavioral: Negative.        All review of system has been reviewed and it  is negative except the ones note above.     Objective     Vital signs stable this visit    /82 (BP Location: Left arm, Patient Position: Sitting, Cuff Size: Adult)   Pulse 67   Ht 167.6 cm (65.98\")   Wt 74.8 kg (164 lb 12.8 oz)   SpO2 95%   BMI 26.61 kg/m²     Body mass index is 26.61 kg/m².            Result Review :   Reviewed wound culture report with patient.     Assessment and Plan      Breast:right breast capsular contracture Dias II      Diagnoses and all orders for this visit:    1. Breast reconstruction deformity (Primary)        Plan:  Right  Breast reconstruction revision right breast capsulotomy  and possible implant exchange -1hr    05576- revision of reconstructed breast  19342 - insertion of breast implant on a separate day from mastectomy     OR supply:  Natrelle Smooth round gel implant  x1     Sent plan to   Pt will " decide if she is willing to have surgery when contacted with a date.  Photos obtained today in clinic      Scribed by Cherie Zendejas, acting as a scribe for Maryse Wilkinson MD, 07/08/24 13:23 EDT.  Maryse Wilkinson MD's signature on the note affirms that the note adequately documents the care provided.      Maryse Wilkinson MD  07/08/2024

## 2024-06-27 ENCOUNTER — TREATMENT (OUTPATIENT)
Dept: PHYSICAL THERAPY | Facility: CLINIC | Age: 59
End: 2024-06-27
Payer: COMMERCIAL

## 2024-06-27 DIAGNOSIS — M25.512 ACUTE PAIN OF LEFT SHOULDER: Primary | ICD-10-CM

## 2024-06-27 DIAGNOSIS — S42.292S HUMERAL HEAD FRACTURE, LEFT, SEQUELA: ICD-10-CM

## 2024-06-27 DIAGNOSIS — M25.612 DECREASED ROM OF LEFT SHOULDER: ICD-10-CM

## 2024-06-27 DIAGNOSIS — R29.898 WEAKNESS OF LEFT SHOULDER: ICD-10-CM

## 2024-06-27 NOTE — PROGRESS NOTES
Re-Assessment / Re-Certification        Patient: Mariela Polanco   : 1965  Diagnosis/ICD-10 Code:  Acute pain of left shoulder [M25.512]  Referring practitioner: ARTURO Bernardo  Date of Initial Visit: Type: THERAPY  Noted: 2024  Today's Date: 2024  Patient seen for 5 sessions      Subjective:   Mariela Polanco reports: she still has some sharp pains when she moves her arm a certain way and difficulty reaching across to put on her deodorant.  Subjective Questionnaire: QuickDASH: 22.73  Clinical Progress: improved  Home Program Compliance: Yes  Treatment has included: therapeutic exercise, manual therapy, therapeutic activity, and cryotherapy      Objective     Active Range of Motion     Left Shoulder   Flexion: 140 degrees with pain  Abduction: 110 degrees with pain  External rotation 0°: 68 degrees   External rotation BTH: Active external rotation behind the head: occiput. with pain  Internal rotation BTB: Active internal rotation behind the back: ipsilateral back pocket. with pain     Left Elbow   Flexion: WFL and with pain  Extension: WFL and with pain     Passive Range of Motion   Left Shoulder   Flexion: 167 degrees with pain  Abduction: 132 degrees with pain  External rotation 45°: 55 degrees with pain and at 90 degrees ABD: 45 degrees  Internal rotation 45°: 75 degrees     Scapular Mobility   Left Shoulder   Scapular mobility: fair     Joint Play   Left Shoulder  Hypomobile in the anterior capsule, posterior capsule and inferior capsule.     Strength/Myotome Testing      Left Shoulder      Planes of Motion   Flexion: 3-   Abduction: 2+   External rotation at 0°: 3-  Internal rotation at 0°: 3-    Assessment & Plan       Assessment  Impairments: abnormal muscle firing, abnormal muscle tone, abnormal or restricted ROM, activity intolerance, impaired physical strength, lacks appropriate home exercise program, pain with function and weight-bearing intolerance   Functional limitations:  carrying objects, lifting, sleeping, pulling, pushing, uncomfortable because of pain, moving in bed, reaching behind back, reaching overhead and unable to perform repetitive tasks   Assessment details: Mariela Polanco is a 58 y.o. year-old female referred to physical therapy after sustaining a L humeral head fx from a fall. She presents with a evolving clinical presentation.  She has comorbidities recent double mascectomy and personal factors of frequent travel for work that may affect her progress in the plan of care.  Pt with improving ROM of L shoulder, strength and flexibility but continued pain. Patient is appropriate for skilled physical therapy in order to reduce pain and increase ease with daily mobility. Pt encouraged to schedule follow up with MD to assess healing. During therapy, pt educated on anatomy, goal of interventions, sleeping position, and body mechanics to promote healthy lifestyle and improve quality of life.  Barriers to therapy: frequent travel for work  Prognosis: good    Goals  Plan Goals: ST days  1. Pt will demonstrate L shoulder PROM in flexion and abduction of >160 degrees to improve overhead activity-PROGRESSING  2. Pt will demonstrate L shoulder PROM in ER and IR (at 90 degrees ABD) to 80 degrees-PROGRESSING  3. Pt will be compliant with self stretches/HEP at home at least 3 days per week.-PROGRESSING  4. Decrease right shoulder pain to a 2/10 with reaching and lifting into upper cabinets with LUE-PROGRESSING  5. Decrease LUE tenderness with palpation to minimal over the acromion and supraspinatus tendon to be able to sleep on her side-PROGRESSING    LT days  1. Pt will demonstrate L shoulder AROM WNL and minimal pain to be able to complete overhead reaching-PROGRESSING  2. Pt will be able to reach lumbar spine LUE to be able to complete dressing tasks-PROGRESSING  3. Pt will improve L shoulder strength to 4 to 4+/5 to be able to carry laundry basket and manage bag into  overhead space during flights-PROGRESSING  4. Pt will improve QuickDASH score to 10 or less to improve subjective function of shoulder with ADLs-PROGRESSING      Plan  Therapy options: will be seen for skilled therapy services  Planned modality interventions: cryotherapy, electrical stimulation/Russian stimulation, iontophoresis, TENS, thermotherapy (hydrocollator packs), ultrasound and dry needling  Planned therapy interventions: ADL retraining, balance/weight-bearing training, body mechanics training, fine motor coordination training, flexibility, functional ROM exercises, home exercise program, IADL retraining, joint mobilization, manual therapy, neuromuscular re-education, postural training, soft tissue mobilization, spinal/joint mobilization, strengthening, stretching and therapeutic activities  Treatment plan discussed with: patient  Plan details: 1 times per week for 10 weeks        Visit Diagnoses:    ICD-10-CM ICD-9-CM   1. Acute pain of left shoulder  M25.512 719.41   2. Humeral head fracture, left, sequela  S42.292S 905.2   3. Decreased ROM of left shoulder  M25.612 719.51   4. Weakness of left shoulder  R29.898 781.99         PT Signature: TAMIE Yang license: [id number]        Timed:  Manual Therapy:    15     mins  51499;  Therapeutic Exercise:    15     mins  24179;     Neuromuscular Carter:    -    mins  47447;    Therapeutic Activity:     10     mins  03180;     Gait Training:      -     mins  74325;     Ultrasound:     -     mins  18159;    Electrical Stimulation:    -     mins  71717 ( );    Untimed:  Electrical Stimulation:    -     mins  97283 ( );  Mechanical Traction:    -     mins  46098; \  Dry needling:      -     mins  20560/20561    Timed Treatment:   40   mins   Total Treatment:     45   mins

## 2024-07-03 ENCOUNTER — TREATMENT (OUTPATIENT)
Dept: PHYSICAL THERAPY | Facility: CLINIC | Age: 59
End: 2024-07-03
Payer: COMMERCIAL

## 2024-07-03 DIAGNOSIS — M25.612 DECREASED ROM OF LEFT SHOULDER: ICD-10-CM

## 2024-07-03 DIAGNOSIS — M25.512 ACUTE PAIN OF LEFT SHOULDER: Primary | ICD-10-CM

## 2024-07-03 DIAGNOSIS — S42.292S HUMERAL HEAD FRACTURE, LEFT, SEQUELA: ICD-10-CM

## 2024-07-03 DIAGNOSIS — R29.898 WEAKNESS OF LEFT SHOULDER: ICD-10-CM

## 2024-07-03 NOTE — PROGRESS NOTES
Physical Therapy Daily Treatment Note      Patient: Mariela Polanco   : 1965  Referring practitioner: ARTURO Bernardo  Date of Initial Visit: Type: THERAPY  Noted: 2024  Today's Date: 7/3/2024  Patient seen for 6 sessions         Mariela Polanco reports: she is always sore after PT and has a call into the doctor's office to schedule his follow up.               Objective   See Exercise, Manual, and Modality Logs for complete treatment.       Assessment/Plan  Pt with improving L shoulder ROM but still painful at proximal humerus with L arm elevation. PROM limited by pain but continues to improve. Pt encouraged to reach out to doctor for x rays to assess healing of L humerus; missed last follow up appt. Pt reports pain with ADLs is tolerable however increased pain with end ranges of all motion; most limited in horizontal ADD, ER and IR. Denies ice having to get to work.        Progress per Plan of Care and Progress strengthening /stabilization /functional activity           Timed:  Manual Therapy:    12     mins  07147;  Therapeutic Exercise:    10     mins  52194;     Neuromuscular Carter:    -    mins  41474;    Therapeutic Activity:     8     mins  60687;     Gait Training:      -     mins  65071;     Ultrasound:     -     mins  09243;      Untimed:  Electrical Stimulation:    -     mins  50417 ( );  Mechanical Traction:    -     mins  80618;   Dry needling:      -     mins  06586/    Timed Treatment:   30   mins   Total Treatment:     30   mins  Paz Tse PT  Physical Therapist    License #: [id number]

## 2024-07-08 ENCOUNTER — OFFICE VISIT (OUTPATIENT)
Dept: PLASTIC SURGERY | Facility: CLINIC | Age: 59
End: 2024-07-08
Payer: COMMERCIAL

## 2024-07-08 VITALS
DIASTOLIC BLOOD PRESSURE: 82 MMHG | HEART RATE: 67 BPM | OXYGEN SATURATION: 95 % | BODY MASS INDEX: 26.48 KG/M2 | WEIGHT: 164.8 LBS | HEIGHT: 66 IN | SYSTOLIC BLOOD PRESSURE: 123 MMHG

## 2024-07-08 DIAGNOSIS — N65.0 BREAST RECONSTRUCTION DEFORMITY: Primary | ICD-10-CM

## 2024-07-08 PROCEDURE — 99213 OFFICE O/P EST LOW 20 MIN: CPT | Performed by: SURGERY

## 2024-07-09 ENCOUNTER — PATIENT MESSAGE (OUTPATIENT)
Dept: PLASTIC SURGERY | Facility: CLINIC | Age: 59
End: 2024-07-09
Payer: COMMERCIAL

## 2024-07-11 ENCOUNTER — PREP FOR SURGERY (OUTPATIENT)
Dept: OTHER | Facility: HOSPITAL | Age: 59
End: 2024-07-11
Payer: COMMERCIAL

## 2024-07-11 DIAGNOSIS — N65.0 BREAST RECONSTRUCTION DEFORMITY: Primary | ICD-10-CM

## 2024-07-11 RX ORDER — CHLORHEXIDINE GLUCONATE 40 MG/ML
473 SOLUTION TOPICAL DAILY
Qty: 473 ML | Refills: 0 | Status: SHIPPED | OUTPATIENT
Start: 2024-07-11

## 2024-07-11 RX ORDER — SCOLOPAMINE TRANSDERMAL SYSTEM 1 MG/1
1 PATCH, EXTENDED RELEASE TRANSDERMAL CONTINUOUS
OUTPATIENT
Start: 2024-07-11 | End: 2024-07-14

## 2024-07-11 RX ORDER — ONDANSETRON 4 MG/1
4 TABLET, FILM COATED ORAL DAILY PRN
Qty: 18 TABLET | Refills: 0 | Status: SHIPPED | OUTPATIENT
Start: 2024-07-11

## 2024-07-11 RX ORDER — ACETAMINOPHEN 500 MG
1000 TABLET ORAL EVERY 6 HOURS PRN
Qty: 18 TABLET | Refills: 0 | Status: SHIPPED | OUTPATIENT
Start: 2024-07-11

## 2024-07-11 RX ORDER — ACETAMINOPHEN 500 MG
1000 TABLET ORAL ONCE
OUTPATIENT
Start: 2024-07-11 | End: 2024-07-11

## 2024-07-11 NOTE — TELEPHONE ENCOUNTER
07/11/24 2:45p.m.-Called patient and offered surgery date 7/26/24. Patient accepted. Pre-op has been scheduled.

## 2024-07-12 NOTE — PROGRESS NOTES
"Pre-op Exam            History of Present Illness  Mariela Polanco is a 58 y.o. female who presents to [facility] PLASTIC & RECONSTRUCTIVE SURGERY for Pre-Operative Examination for BREAST RECONSTRUCTION REVISION, Right  Breast reconstruction revision right breast capsulotomy  and possible implant exchange 7/26/24.    Pt presents today for pre op.    Subjective        Penicillins  Allergies Reconciled.    Review of Systems   All review of system has been reviewed and it  is negative except the ones note above.     Objective     /82 (BP Location: Left arm, Patient Position: Sitting, Cuff Size: Adult)   Pulse 60   Ht 167.6 cm (65.98\")   Wt 75.3 kg (166 lb)   SpO2 98%   BMI 26.81 kg/m²     Body mass index is 26.81 kg/m².    Physical Exam    Breast:right breast capsular contracture Dias II     Result Review :                Assessment and Plan      Diagnoses and all orders for this visit:    1. Pre-operative examination (Primary)  -     Nicotine Screen, Urine - Urine, Clean Catch; Future  -     naproxen (NAPROSYN) 250 MG tablet; Take 1 tablet by mouth 2 (Two) Times a Day.  Dispense: 12 tablet; Refill: 0  -     gabapentin (Neurontin) 300 MG capsule; Take 1 capsule by mouth 3 (Three) Times a Day for 18 doses.  Dispense: 18 capsule; Refill: 0  -     clindamycin (Cleocin) 150 MG capsule; Take 1 capsule by mouth 3 (Three) Times a Day.  Dispense: 30 capsule; Refill: 0    2. Breast reconstruction deformity        Plan:  Given these options, the patient has verbally expressed an understanding of the risks of surgery and finds these risks acceptable. We will proceed with surgery as soon as possible.    Medications sent to pharmacy  Urine nicotine ordered  Photos on 7/8/24      Scribed by Cherie Zendejas, acting as a scribe for Maryse Wilkinson MD, 07/17/24 09:53 EDT.  Maryse Wilkinson MD's signature on the note affirms that the note adequately documents the care provided.          Follow Up "     Return RTC on 8/1/24 for 1 week post op.    Patient was given instructions and counseling regarding her condition. Please see specific information pulled into the AVS if appropriate.     Maryse Wilkinson MD  07/17/2024

## 2024-07-12 NOTE — H&P (VIEW-ONLY)
"Pre-op Exam            History of Present Illness  Mariela Polanco is a 58 y.o. female who presents to [facility] PLASTIC & RECONSTRUCTIVE SURGERY for Pre-Operative Examination for BREAST RECONSTRUCTION REVISION, Right  Breast reconstruction revision right breast capsulotomy  and possible implant exchange 7/26/24.    Pt presents today for pre op.    Subjective        Penicillins  Allergies Reconciled.    Review of Systems   All review of system has been reviewed and it  is negative except the ones note above.     Objective     /82 (BP Location: Left arm, Patient Position: Sitting, Cuff Size: Adult)   Pulse 60   Ht 167.6 cm (65.98\")   Wt 75.3 kg (166 lb)   SpO2 98%   BMI 26.81 kg/m²     Body mass index is 26.81 kg/m².    Physical Exam    Breast:right breast capsular contracture Dias II     Result Review :                Assessment and Plan      Diagnoses and all orders for this visit:    1. Pre-operative examination (Primary)  -     Nicotine Screen, Urine - Urine, Clean Catch; Future  -     naproxen (NAPROSYN) 250 MG tablet; Take 1 tablet by mouth 2 (Two) Times a Day.  Dispense: 12 tablet; Refill: 0  -     gabapentin (Neurontin) 300 MG capsule; Take 1 capsule by mouth 3 (Three) Times a Day for 18 doses.  Dispense: 18 capsule; Refill: 0  -     clindamycin (Cleocin) 150 MG capsule; Take 1 capsule by mouth 3 (Three) Times a Day.  Dispense: 30 capsule; Refill: 0    2. Breast reconstruction deformity        Plan:  Given these options, the patient has verbally expressed an understanding of the risks of surgery and finds these risks acceptable. We will proceed with surgery as soon as possible.    Medications sent to pharmacy  Urine nicotine ordered  Photos on 7/8/24      Scribed by Cherie Zendejas, acting as a scribe for Maryse Wilkinson MD, 07/17/24 09:53 EDT.  Maryse Wilkinson MD's signature on the note affirms that the note adequately documents the care provided.          Follow Up "     Return RTC on 8/1/24 for 1 week post op.    Patient was given instructions and counseling regarding her condition. Please see specific information pulled into the AVS if appropriate.     Maryse Wilkinson MD  07/17/2024

## 2024-07-14 DIAGNOSIS — F32.0 CURRENT MILD EPISODE OF MAJOR DEPRESSIVE DISORDER WITHOUT PRIOR EPISODE: ICD-10-CM

## 2024-07-15 RX ORDER — BUPROPION HYDROCHLORIDE 300 MG/1
300 TABLET ORAL DAILY
Qty: 90 TABLET | Refills: 3 | Status: SHIPPED | OUTPATIENT
Start: 2024-07-15

## 2024-07-16 ENCOUNTER — TELEPHONE (OUTPATIENT)
Dept: PLASTIC SURGERY | Facility: CLINIC | Age: 59
End: 2024-07-16
Payer: COMMERCIAL

## 2024-07-17 ENCOUNTER — LAB (OUTPATIENT)
Dept: LAB | Facility: HOSPITAL | Age: 59
End: 2024-07-17
Payer: COMMERCIAL

## 2024-07-17 ENCOUNTER — OFFICE VISIT (OUTPATIENT)
Dept: PLASTIC SURGERY | Facility: CLINIC | Age: 59
End: 2024-07-17
Payer: COMMERCIAL

## 2024-07-17 VITALS
DIASTOLIC BLOOD PRESSURE: 82 MMHG | HEIGHT: 66 IN | SYSTOLIC BLOOD PRESSURE: 125 MMHG | OXYGEN SATURATION: 98 % | HEART RATE: 60 BPM | WEIGHT: 166 LBS | BODY MASS INDEX: 26.68 KG/M2

## 2024-07-17 DIAGNOSIS — Z01.818 PRE-OPERATIVE EXAMINATION: ICD-10-CM

## 2024-07-17 DIAGNOSIS — R73.03 PRE-DIABETES: ICD-10-CM

## 2024-07-17 DIAGNOSIS — N65.0 BREAST RECONSTRUCTION DEFORMITY: ICD-10-CM

## 2024-07-17 DIAGNOSIS — I10 ESSENTIAL HYPERTENSION: ICD-10-CM

## 2024-07-17 DIAGNOSIS — Z01.818 PRE-OPERATIVE EXAMINATION: Primary | ICD-10-CM

## 2024-07-17 LAB — COTININE UR-MCNC: NEGATIVE NG/ML

## 2024-07-17 PROCEDURE — 80053 COMPREHEN METABOLIC PANEL: CPT

## 2024-07-17 PROCEDURE — 36415 COLL VENOUS BLD VENIPUNCTURE: CPT

## 2024-07-17 PROCEDURE — 83036 HEMOGLOBIN GLYCOSYLATED A1C: CPT

## 2024-07-17 PROCEDURE — 80061 LIPID PANEL: CPT

## 2024-07-17 PROCEDURE — G0480 DRUG TEST DEF 1-7 CLASSES: HCPCS

## 2024-07-17 PROCEDURE — 85025 COMPLETE CBC W/AUTO DIFF WBC: CPT

## 2024-07-17 PROCEDURE — 99212 OFFICE O/P EST SF 10 MIN: CPT | Performed by: SURGERY

## 2024-07-18 LAB
BASOPHILS # BLD AUTO: 0 X10E3/UL (ref 0–0.2)
BASOPHILS NFR BLD AUTO: 1 %
EOSINOPHIL # BLD AUTO: 0.1 X10E3/UL (ref 0–0.4)
EOSINOPHIL NFR BLD AUTO: 1 %
ERYTHROCYTE [DISTWIDTH] IN BLOOD BY AUTOMATED COUNT: 12.5 % (ref 11.7–15.4)
HBA1C MFR BLD: 5.7 % (ref 4.8–5.6)
HCT VFR BLD AUTO: 40.8 % (ref 34–46.6)
HGB BLD-MCNC: 13.7 G/DL (ref 11.1–15.9)
IMM GRANULOCYTES # BLD AUTO: 0 X10E3/UL (ref 0–0.1)
IMM GRANULOCYTES NFR BLD AUTO: 0 %
LYMPHOCYTES # BLD AUTO: 2.8 X10E3/UL (ref 0.7–3.1)
LYMPHOCYTES NFR BLD AUTO: 37 %
MCH RBC QN AUTO: 30.6 PG (ref 26.6–33)
MCHC RBC AUTO-ENTMCNC: 33.6 G/DL (ref 31.5–35.7)
MCV RBC AUTO: 91 FL (ref 79–97)
MONOCYTES # BLD AUTO: 0.7 X10E3/UL (ref 0.1–0.9)
MONOCYTES NFR BLD AUTO: 9 %
NEUTROPHILS # BLD AUTO: 4 X10E3/UL (ref 1.4–7)
NEUTROPHILS NFR BLD AUTO: 52 %
PLATELET # BLD AUTO: 262 X10E3/UL (ref 150–450)
RBC # BLD AUTO: 4.47 X10E6/UL (ref 3.77–5.28)
WBC # BLD AUTO: 7.5 X10E3/UL (ref 3.4–10.8)

## 2024-07-18 RX ORDER — NAPROXEN 250 MG/1
250 TABLET ORAL 2 TIMES DAILY
Qty: 12 TABLET | Refills: 0 | Status: SHIPPED | OUTPATIENT
Start: 2024-07-18

## 2024-07-18 RX ORDER — GABAPENTIN 300 MG/1
300 CAPSULE ORAL 3 TIMES DAILY
Qty: 18 CAPSULE | Refills: 0 | Status: SHIPPED | OUTPATIENT
Start: 2024-07-18 | End: 2024-07-24

## 2024-07-18 RX ORDER — CLINDAMYCIN HYDROCHLORIDE 150 MG/1
150 CAPSULE ORAL 3 TIMES DAILY
Qty: 30 CAPSULE | Refills: 0 | Status: SHIPPED | OUTPATIENT
Start: 2024-07-18

## 2024-07-19 LAB
ALBUMIN SERPL-MCNC: 4.4 G/DL (ref 3.8–4.9)
ALP SERPL-CCNC: 48 IU/L (ref 44–121)
ALT SERPL-CCNC: 22 IU/L (ref 0–32)
AST SERPL-CCNC: 21 IU/L (ref 0–40)
BILIRUB SERPL-MCNC: 0.3 MG/DL (ref 0–1.2)
BUN SERPL-MCNC: 11 MG/DL (ref 6–24)
BUN/CREAT SERPL: 15 (ref 9–23)
CALCIUM SERPL-MCNC: 9.5 MG/DL (ref 8.7–10.2)
CHLORIDE SERPL-SCNC: 105 MMOL/L (ref 96–106)
CHOLEST SERPL-MCNC: 180 MG/DL (ref 100–199)
CHOLEST/HDLC SERPL: 2.8 RATIO (ref 0–4.4)
CO2 SERPL-SCNC: 18 MMOL/L (ref 20–29)
CREAT SERPL-MCNC: 0.75 MG/DL (ref 0.57–1)
EGFRCR SERPLBLD CKD-EPI 2021: 93 ML/MIN/1.73
GLOBULIN SER CALC-MCNC: 2.7 G/DL (ref 1.5–4.5)
GLUCOSE SERPL-MCNC: 102 MG/DL (ref 70–99)
HDLC SERPL-MCNC: 64 MG/DL
LDLC SERPL CALC-MCNC: 96 MG/DL (ref 0–99)
POTASSIUM SERPL-SCNC: 4.5 MMOL/L (ref 3.5–5.2)
PROT SERPL-MCNC: 7.1 G/DL (ref 6–8.5)
SODIUM SERPL-SCNC: 141 MMOL/L (ref 134–144)
TRIGL SERPL-MCNC: 114 MG/DL (ref 0–149)
VLDLC SERPL CALC-MCNC: 20 MG/DL (ref 5–40)

## 2024-07-22 NOTE — PROGRESS NOTES
"Post-op Follow-up            History of Present Illness  Mariela Polanco is a 58 y.o. female who presents to [facility] PLASTIC & RECONSTRUCTIVE SURGERY as a   BREAST RECONSTRUCTION REVISION, Right  Breast reconstruction revision right breast capsulotomy  and possible implant exchange   On 7/26/24.      1 drain inplace with output   Right drain 45/40/35    Subjective      Penicillins  Allergies Reconciled.    Review of Systems   All review of system has been reviewed and it  is negative except the ones note above.     Objective     /86 (BP Location: Right arm, Patient Position: Sitting, Cuff Size: Adult)   Pulse 68   Temp 98 °F (36.7 °C) (Temporal)   Ht 167.6 cm (65.98\")   Wt 73.3 kg (161 lb 9.6 oz)   SpO2 94%   BMI 26.10 kg/m²     Body mass index is 26.1 kg/m².    Physical Exam  Breast:incision c/d/I with no sign of infection; breast more symmetric       Result Review :              Assessment and Plan      Diagnoses and all orders for this visit:    1. Breast reconstruction deformity (Primary)        Additional Order(s):       Plan:  Drain will remain today due to output; redressed drain site with biopatch and tegaderm  Pt will return to clinic in 1 week for possible drain removal          Scribed by Cherie Zendejas, acting as a scribe for Maryse Wilkinson MD, 08/01/24 09:19 EDT.  Maryse Wilkinson MD's signature on the note affirms that the note adequately documents the care provided.      I spent 15 minutes caring for Mariela on this date of service. This time includes time spent by me in the following activities:performing a medically appropriate examination and/or evaluation , counseling and educating the patient/family/caregiver, documenting information in the medical record, and care coordination    Follow Up     Return RTC in 1 week.    Patient was given instructions and counseling regarding her condition. Please see specific information pulled into the AVS if " appropriate.     Maryse Wilkinson MD  08/01/2024

## 2024-07-25 ENCOUNTER — TELEPHONE (OUTPATIENT)
Dept: PLASTIC SURGERY | Facility: CLINIC | Age: 59
End: 2024-07-25
Payer: COMMERCIAL

## 2024-07-25 NOTE — TELEPHONE ENCOUNTER
We received message from [facility] stating that the patient had not received antibiotic yet.  I contacted pharmacy and they said that she had picked up everything we prescribed and the Clindamycin was one of them.    I contacted the patient and reviewed all prescribed medications and she did have the clindamycin on hand.

## 2024-07-25 NOTE — PRE-PROCEDURE INSTRUCTIONS
PATIENT INSTRUCTED TO BE:    - NOTHING TO EAT AFTER MIDNIGHT OR CHEW, EXCEPT CAN HAVE CLEAR LIQUIDS 2 HOURS PRIOR TO SURGERY ARRIVAL TIME , NO MORE THAN 8 OZ. (NOTHING RED)     - TO HOLD ALL VITAMINS, SUPPLEMENTS, NSAIDS FOR ONE WEEK PRIOR TO THEIR SURGICAL PROCEDURE         - BATHING INSTRUCTIONS GIVEN    INSTRUCTED NO LOTIONS, JEWELRY, PIERCINGS,  NAIL POLISH, OR DEODORANT DAY OF SURGERY        -INSTRUCTED TO TAKE THE FOLLOWING MEDICATIONS THE DAY OF SURGERY WITH SIPS OF WATER:   none        - DO NOT BRING ANY MEDICATIONS WITH YOU TO THE HOSPITAL THE DAY OF SURGERY, EXCEPT IF USE INHALERS. BRING INHALERS DAY OF SURGERY       - BRING CPAP OR BIPAP TO THE HOSPITAL ONLY IF YOU ARE SPENDING THE NIGHT    - DO NOT SMOKE OR VAPE 24 HOURS PRIOR TO PROCEDURE PER ANESTHESIA REQUEST     -MAKE SURE YOU HAVE A RIDE HOME OR SOMEONE TO STAY WITH YOU THE DAY OF THE PROCEDURE AFTER YOU GO HOME     - FOLLOW ANY OTHER INSTRUCTIONS GIVEN TO YOU BY YOUR SURGEON'S OFFICE.      COME TO [facility]/ [facility], FIRST FLOOR. CHECK IN AT THE DESK FOR REGISTRATION/SURGERY    - YOU WILL RECEIVE A PHONE CALL THE DAY PRIOR TO SURGERY BETWEEN 1PM AND 4 PM WITH ARRIVAL TIME, IF YOUR SURGERY IS ON A MONDAY YOU WILL RECEIVE A CALL THE FRIDAY PRIOR TO SURGERY DATE    - BRING CASH OR CREDIT CARD FOR COPAYMENT OF MEDICATIONS AFTER SURGERY IF YOU USE THE HOSPITAL PHARMACY (MEDS TO BED)    - PREADMISSION TESTING NURSE GORAN JERONIMO [phone] IF HAVE ANY QUESTIONS     -PATIENT PROVIDED THE NUMBER FOR PREOP SURGICAL DEPT IF HAD QUESTIONS AFTER HOURS PRIOR TO SURGERY [phone]).  INFORMED PT IF NO ANSWER, LEAVE A MESSAGE AND SOMEONE WILL RETURN THEIR CALL       PATIENT VERBALIZED UNDERSTANDING       Clear Liquid Diet        Find out when you need to start a clear liquid diet.   Think of “clear liquids” as anything you could read a newspaper through. This includes things like water, broth, sports drinks, or tea WITHOUT any kind of milk or cream.            Once you are told to start a clear liquid diet, only drink these things until 2 hours before arrival to the hospital or when the hospital says to stop. Total volume limitation: 8 oz.       Clear liquids you CAN drink:   Water   Clear broth: beef, chicken, vegetable, or bone broth with nothing in it   Gatorade   Lemonade or Colt-aid   Soda   Tea, coffee (NO cream or honey)   Jell-O (without fruit)   Popsicles (without fruit or cream)   Italian ices   Juice without pulp: apple, white, grape   You may use salt, pepper, and sugar  NO RED  NO NOODLES    Do NOT drink:   Milk or cream   Soy milk, almond milk, coconut milk, or other non-dairy drinks and   creamers   Milkshakes or smoothies   Tomato juice   Orange juice   Grapefruit juice   Cream soups or any other than broth         Clear Liquid Diet:  Do NOT eat any solid food.  Do NOT eat or suck on mints or candy.  Do NOT chew gum.  Do NOT drink thick liquids like milk or juice with pulp in it.  Do NOT add milk, cream, or anything like soy milk or almond milk to coffee or tea.

## 2024-07-26 ENCOUNTER — ANESTHESIA (OUTPATIENT)
Dept: PERIOP | Facility: HOSPITAL | Age: 59
End: 2024-07-26
Payer: COMMERCIAL

## 2024-07-26 ENCOUNTER — ANESTHESIA EVENT (OUTPATIENT)
Dept: PERIOP | Facility: HOSPITAL | Age: 59
End: 2024-07-26
Payer: COMMERCIAL

## 2024-07-26 ENCOUNTER — HOSPITAL ENCOUNTER (OUTPATIENT)
Facility: HOSPITAL | Age: 59
Setting detail: HOSPITAL OUTPATIENT SURGERY
Discharge: HOME | End: 2024-07-26
Attending: SURGERY | Admitting: SURGERY
Payer: COMMERCIAL

## 2024-07-26 VITALS
DIASTOLIC BLOOD PRESSURE: 67 MMHG | TEMPERATURE: 97.4 F | BODY MASS INDEX: 26.79 KG/M2 | SYSTOLIC BLOOD PRESSURE: 106 MMHG | HEART RATE: 71 BPM | WEIGHT: 166.67 LBS | OXYGEN SATURATION: 97 % | HEIGHT: 66 IN | RESPIRATION RATE: 16 BRPM

## 2024-07-26 DIAGNOSIS — N65.0 BREAST RECONSTRUCTION DEFORMITY: ICD-10-CM

## 2024-07-26 LAB — COTININE UR-MCNC: NEGATIVE NG/ML

## 2024-07-26 PROCEDURE — 25810000003 LACTATED RINGERS PER 1000 ML: Performed by: ANESTHESIOLOGY

## 2024-07-26 PROCEDURE — 11043 DBRDMT MUSC&/FSCA 1ST 20/<: CPT | Performed by: SURGERY

## 2024-07-26 PROCEDURE — 25010000002 GLYCOPYRROLATE 0.2 MG/ML SOLUTION

## 2024-07-26 PROCEDURE — 25010000002 VASOPRESSIN 20 UNIT/ML SOLUTION

## 2024-07-26 PROCEDURE — 93010 ELECTROCARDIOGRAM REPORT: CPT | Performed by: INTERNAL MEDICINE

## 2024-07-26 PROCEDURE — 25010000002 MIDAZOLAM PER 1MG: Performed by: ANESTHESIOLOGY

## 2024-07-26 PROCEDURE — 19342 INSJ/RPLCMT BRST IMPLT SEP D: CPT

## 2024-07-26 PROCEDURE — 25010000002 FENTANYL CITRATE (PF) 50 MCG/ML SOLUTION

## 2024-07-26 PROCEDURE — 25010000002 BUPIVACAINE (PF) 0.5 % SOLUTION: Performed by: SURGERY

## 2024-07-26 PROCEDURE — C1789 PROSTHESIS, BREAST, IMP: HCPCS | Performed by: SURGERY

## 2024-07-26 PROCEDURE — 25010000002 SUGAMMADEX 200 MG/2ML SOLUTION

## 2024-07-26 PROCEDURE — 11046 DBRDMT MUSC&/FSCA EA ADDL: CPT | Performed by: SURGERY

## 2024-07-26 PROCEDURE — 25010000002 ONDANSETRON PER 1 MG

## 2024-07-26 PROCEDURE — 25010000002 CEFAZOLIN PER 500 MG: Performed by: SURGERY

## 2024-07-26 PROCEDURE — 25010000002 DEXAMETHASONE PER 1 MG: Performed by: SURGERY

## 2024-07-26 PROCEDURE — G0480 DRUG TEST DEF 1-7 CLASSES: HCPCS | Performed by: SURGERY

## 2024-07-26 PROCEDURE — 93005 ELECTROCARDIOGRAM TRACING: CPT | Performed by: ANESTHESIOLOGY

## 2024-07-26 PROCEDURE — 25010000002 DEXAMETHASONE PER 1 MG

## 2024-07-26 PROCEDURE — 19342 INSJ/RPLCMT BRST IMPLT SEP D: CPT | Performed by: SURGERY

## 2024-07-26 PROCEDURE — 88302 TISSUE EXAM BY PATHOLOGIST: CPT | Performed by: SURGERY

## 2024-07-26 PROCEDURE — 19370 REVJ PERI-IMPLT CAPSULE BRST: CPT | Performed by: SURGERY

## 2024-07-26 PROCEDURE — 25010000002 PROPOFOL 10 MG/ML EMULSION

## 2024-07-26 DEVICE — BRST SIL NATRELLE INSPIRA SMOTH F/P 650CC: Type: IMPLANTABLE DEVICE | Site: BREAST | Status: FUNCTIONAL

## 2024-07-26 RX ORDER — ACETAMINOPHEN 500 MG
1000 TABLET ORAL ONCE
Status: COMPLETED | OUTPATIENT
Start: 2024-07-26 | End: 2024-07-26

## 2024-07-26 RX ORDER — FENTANYL CITRATE 50 UG/ML
INJECTION, SOLUTION INTRAMUSCULAR; INTRAVENOUS AS NEEDED
Status: DISCONTINUED | OUTPATIENT
Start: 2024-07-26 | End: 2024-07-26 | Stop reason: SURG

## 2024-07-26 RX ORDER — GLYCOPYRROLATE 0.2 MG/ML
INJECTION INTRAMUSCULAR; INTRAVENOUS AS NEEDED
Status: DISCONTINUED | OUTPATIENT
Start: 2024-07-26 | End: 2024-07-26 | Stop reason: SURG

## 2024-07-26 RX ORDER — PROMETHAZINE HYDROCHLORIDE 12.5 MG/1
25 TABLET ORAL ONCE AS NEEDED
Status: DISCONTINUED | OUTPATIENT
Start: 2024-07-26 | End: 2024-07-26 | Stop reason: HOSPADM

## 2024-07-26 RX ORDER — VASOPRESSIN 20 U/ML
INJECTION PARENTERAL AS NEEDED
Status: DISCONTINUED | OUTPATIENT
Start: 2024-07-26 | End: 2024-07-26 | Stop reason: SURG

## 2024-07-26 RX ORDER — LIDOCAINE HYDROCHLORIDE 20 MG/ML
INJECTION, SOLUTION EPIDURAL; INFILTRATION; INTRACAUDAL; PERINEURAL AS NEEDED
Status: DISCONTINUED | OUTPATIENT
Start: 2024-07-26 | End: 2024-07-26 | Stop reason: SURG

## 2024-07-26 RX ORDER — SODIUM CHLORIDE, SODIUM LACTATE, POTASSIUM CHLORIDE, CALCIUM CHLORIDE 600; 310; 30; 20 MG/100ML; MG/100ML; MG/100ML; MG/100ML
9 INJECTION, SOLUTION INTRAVENOUS CONTINUOUS PRN
Status: DISCONTINUED | OUTPATIENT
Start: 2024-07-26 | End: 2024-07-26 | Stop reason: HOSPADM

## 2024-07-26 RX ORDER — MEPERIDINE HYDROCHLORIDE 25 MG/ML
12.5 INJECTION INTRAMUSCULAR; INTRAVENOUS; SUBCUTANEOUS
Status: DISCONTINUED | OUTPATIENT
Start: 2024-07-26 | End: 2024-07-26 | Stop reason: HOSPADM

## 2024-07-26 RX ORDER — ONDANSETRON 2 MG/ML
INJECTION INTRAMUSCULAR; INTRAVENOUS AS NEEDED
Status: DISCONTINUED | OUTPATIENT
Start: 2024-07-26 | End: 2024-07-26 | Stop reason: SURG

## 2024-07-26 RX ORDER — DEXAMETHASONE SODIUM PHOSPHATE 4 MG/ML
INJECTION, SOLUTION INTRA-ARTICULAR; INTRALESIONAL; INTRAMUSCULAR; INTRAVENOUS; SOFT TISSUE AS NEEDED
Status: DISCONTINUED | OUTPATIENT
Start: 2024-07-26 | End: 2024-07-26 | Stop reason: SURG

## 2024-07-26 RX ORDER — OXYCODONE HYDROCHLORIDE 5 MG/1
5 TABLET ORAL
Status: DISCONTINUED | OUTPATIENT
Start: 2024-07-26 | End: 2024-07-26 | Stop reason: HOSPADM

## 2024-07-26 RX ORDER — MIDAZOLAM HYDROCHLORIDE 2 MG/2ML
2 INJECTION, SOLUTION INTRAMUSCULAR; INTRAVENOUS ONCE
Status: COMPLETED | OUTPATIENT
Start: 2024-07-26 | End: 2024-07-26

## 2024-07-26 RX ORDER — ONDANSETRON 2 MG/ML
4 INJECTION INTRAMUSCULAR; INTRAVENOUS ONCE AS NEEDED
Status: DISCONTINUED | OUTPATIENT
Start: 2024-07-26 | End: 2024-07-26 | Stop reason: HOSPADM

## 2024-07-26 RX ORDER — SCOLOPAMINE TRANSDERMAL SYSTEM 1 MG/1
1 PATCH, EXTENDED RELEASE TRANSDERMAL CONTINUOUS
Status: DISCONTINUED | OUTPATIENT
Start: 2024-07-26 | End: 2024-07-26 | Stop reason: HOSPADM

## 2024-07-26 RX ORDER — PROMETHAZINE HYDROCHLORIDE 25 MG/1
25 SUPPOSITORY RECTAL ONCE AS NEEDED
Status: DISCONTINUED | OUTPATIENT
Start: 2024-07-26 | End: 2024-07-26 | Stop reason: HOSPADM

## 2024-07-26 RX ORDER — ROCURONIUM BROMIDE 10 MG/ML
INJECTION, SOLUTION INTRAVENOUS AS NEEDED
Status: DISCONTINUED | OUTPATIENT
Start: 2024-07-26 | End: 2024-07-26 | Stop reason: SURG

## 2024-07-26 RX ORDER — BUPIVACAINE HYDROCHLORIDE 5 MG/ML
INJECTION, SOLUTION EPIDURAL; INTRACAUDAL AS NEEDED
Status: DISCONTINUED | OUTPATIENT
Start: 2024-07-26 | End: 2024-07-26 | Stop reason: HOSPADM

## 2024-07-26 RX ORDER — PROPOFOL 10 MG/ML
VIAL (ML) INTRAVENOUS AS NEEDED
Status: DISCONTINUED | OUTPATIENT
Start: 2024-07-26 | End: 2024-07-26 | Stop reason: SURG

## 2024-07-26 RX ORDER — DEXAMETHASONE SODIUM PHOSPHATE 4 MG/ML
INJECTION, SOLUTION INTRA-ARTICULAR; INTRALESIONAL; INTRAMUSCULAR; INTRAVENOUS; SOFT TISSUE AS NEEDED
Status: DISCONTINUED | OUTPATIENT
Start: 2024-07-26 | End: 2024-07-26 | Stop reason: HOSPADM

## 2024-07-26 RX ORDER — ACETAMINOPHEN 500 MG
1000 TABLET ORAL ONCE
Status: DISCONTINUED | OUTPATIENT
Start: 2024-07-26 | End: 2024-07-26 | Stop reason: SDUPTHER

## 2024-07-26 RX ORDER — EPHEDRINE SULFATE 50 MG/ML
INJECTION INTRAVENOUS AS NEEDED
Status: DISCONTINUED | OUTPATIENT
Start: 2024-07-26 | End: 2024-07-26 | Stop reason: SURG

## 2024-07-26 RX ADMIN — SCOPALAMINE 1 PATCH: 1 PATCH, EXTENDED RELEASE TRANSDERMAL at 09:25

## 2024-07-26 RX ADMIN — PROPOFOL 150 MG: 10 INJECTION, EMULSION INTRAVENOUS at 10:20

## 2024-07-26 RX ADMIN — FENTANYL CITRATE 50 MCG: 50 INJECTION, SOLUTION INTRAMUSCULAR; INTRAVENOUS at 10:20

## 2024-07-26 RX ADMIN — ONDANSETRON HYDROCHLORIDE 4 MG: 2 SOLUTION INTRAMUSCULAR; INTRAVENOUS at 10:57

## 2024-07-26 RX ADMIN — SODIUM CHLORIDE, POTASSIUM CHLORIDE, SODIUM LACTATE AND CALCIUM CHLORIDE: 600; 310; 30; 20 INJECTION, SOLUTION INTRAVENOUS at 11:24

## 2024-07-26 RX ADMIN — DEXAMETHASONE SODIUM PHOSPHATE 8 MG: 4 INJECTION, SOLUTION INTRAMUSCULAR; INTRAVENOUS at 10:25

## 2024-07-26 RX ADMIN — SUGAMMADEX 200 MG: 100 INJECTION, SOLUTION INTRAVENOUS at 11:27

## 2024-07-26 RX ADMIN — EPHEDRINE SULFATE 10 MG: 50 INJECTION INTRAVENOUS at 10:34

## 2024-07-26 RX ADMIN — ROCURONIUM BROMIDE 10 MG: 10 INJECTION, SOLUTION INTRAVENOUS at 11:03

## 2024-07-26 RX ADMIN — ROCURONIUM BROMIDE 50 MG: 10 INJECTION, SOLUTION INTRAVENOUS at 10:20

## 2024-07-26 RX ADMIN — SODIUM CHLORIDE 2000 MG: 9 INJECTION, SOLUTION INTRAVENOUS at 10:24

## 2024-07-26 RX ADMIN — ACETAMINOPHEN 1000 MG: 500 TABLET ORAL at 09:07

## 2024-07-26 RX ADMIN — MIDAZOLAM HYDROCHLORIDE 2 MG: 1 INJECTION, SOLUTION INTRAMUSCULAR; INTRAVENOUS at 09:47

## 2024-07-26 RX ADMIN — VASOPRESSIN 1 UNITS: 20 INJECTION INTRAVENOUS at 11:16

## 2024-07-26 RX ADMIN — FENTANYL CITRATE 50 MCG: 50 INJECTION, SOLUTION INTRAMUSCULAR; INTRAVENOUS at 11:17

## 2024-07-26 RX ADMIN — EPHEDRINE SULFATE 10 MG: 50 INJECTION INTRAVENOUS at 10:27

## 2024-07-26 RX ADMIN — VASOPRESSIN 1 UNITS: 20 INJECTION INTRAVENOUS at 10:50

## 2024-07-26 RX ADMIN — GLYCOPYRROLATE 0.2 MG: 0.2 INJECTION INTRAMUSCULAR; INTRAVENOUS at 10:57

## 2024-07-26 RX ADMIN — SODIUM CHLORIDE, POTASSIUM CHLORIDE, SODIUM LACTATE AND CALCIUM CHLORIDE 9 ML/HR: 600; 310; 30; 20 INJECTION, SOLUTION INTRAVENOUS at 09:06

## 2024-07-26 RX ADMIN — LIDOCAINE HYDROCHLORIDE 50 MG: 20 INJECTION, SOLUTION INTRAVENOUS at 10:20

## 2024-07-26 NOTE — DISCHARGE INSTRUCTIONS
Ok for regular diet  Do not drive for next 2 weeks  Ok to shower in 3 days but be aware you are a fall risk so have someone with you or place a chair in the shower. It is ok to wet the breast. Keep dressings over the drains intact.  No sponge or cloths. Wash the drain site before other parts of the body  Strip drains q 8 hours and record drain output daily. Wash hands or wear gloves when manipulating drains.  Do not use your under garments to secure your drains.   Do not exercise for the next 6 weeks.  Sleep in an upright position  No bra for 1 week. After that, wear a comfortable soft bra  Ok to move arms slowly to the shoulder level (brushing hair movement)  Do not fly for 2 months    Take post-operative medications as directed on the bottle.     If you experience any issues or concerns, please contact the office at [phone]. If after hours a call service will notify the on-call provider.       DISCHARGE INSTRUCTIONS  SURGICAL / AMBULATORY  PROCEDURES      For your surgery you had:  General anesthesia (you may have a sore throat for the first 24 hours)  You may experience dizziness, drowsiness, or light-headedness for several hours following surgery/procedure.  Do not stay alone today or tonight.  Limit your activity for 24 hours.  Resume your diet slowly.  Follow whatever special dietary instructions you may have been given by your doctor.  You should not drive or operate machinery, drink alcohol, or sign legally binding documents for 24 hours or while you are taking pain medication.  Last dose of pain medication was given at:   .  NOTIFY YOUR DOCTOR IF YOU EXPERIENCE ANY OF THE FOLLOWING:  Temperature greater than 101 degrees Fahrenheit  Shaking Chills  Redness or excessive drainage from incision  Nausea, vomiting and/or pain that is not controlled by prescribed medications  Increase in bleeding or bleeding that is excessive  Unable to urinate in 6 hours after surgery  If unable to reach your doctor, please  go to the closest Emergency Room  Medications per physician instructions as indicated on Discharge Medication Information Sheet.      SPECIAL INSTRUCTIONS:

## 2024-07-26 NOTE — ANESTHESIA PREPROCEDURE EVALUATION
Anesthesia Evaluation     Patient summary reviewed and Nursing notes reviewed   no history of anesthetic complications:   NPO Solid Status: > 8 hours  NPO Liquid Status: > 2 hours           Airway   Mallampati: II  TM distance: >3 FB  Neck ROM: full  Possible difficult intubation  Dental          Pulmonary - negative pulmonary ROS and normal exam    breath sounds clear to auscultation  Cardiovascular - normal exam  Exercise tolerance: good (4-7 METS)    ECG reviewed  Rhythm: regular  Rate: normal    (+) hypertension (amlodipine)    ROS comment: EKG: SR 61 bpm, anterceptal infarct age unknown.  (Patient denies h/o CAD, MI, or CP)    Neuro/Psych  (+) psychiatric history Depression  GI/Hepatic/Renal/Endo    (+) GERD, liver disease    Musculoskeletal (-) negative ROS    Abdominal  - normal exam   Substance History - negative use     OB/GYN negative ob/gyn ROS         Other      history of cancer    ROS/Med Hx Other: PAT Nursing Notes unavailable.                 Anesthesia Plan    ASA 2     general     (Patient understands anesthesia not responsible for dental damage.)  intravenous induction     Anesthetic plan, risks, benefits, and alternatives have been provided, discussed and informed consent has been obtained with: patient.    Plan discussed with CRNA.      CODE STATUS:

## 2024-07-26 NOTE — ANESTHESIA POSTPROCEDURE EVALUATION
Patient: Mariela Polanco    Procedure Summary       Date: 07/26/24 Room / Location: [facility] OR  /  [facility]    Anesthesia Start: 1014 Anesthesia Stop: 1142    Procedure: BREAST RECONSTRUCTION REVISION, Right  Breast reconstruction revision right breast capsulotomy  and  implant exchange (Right: Breast) Diagnosis:       Breast reconstruction deformity      (Breast reconstruction deformity [N65.0])    Surgeons: Maryse Wilkinson MD Provider: Sharron Rutherford MD    Anesthesia Type: general ASA Status: 2            Anesthesia Type: general    Vitals  Vitals Value Taken Time   /71 07/26/24 1211   Temp 36.3 °C (97.4 °F) 07/26/24 1211   Pulse 67 07/26/24 1211   Resp 16 07/26/24 1211   SpO2 97 % 07/26/24 1211           Post Anesthesia Care and Evaluation    Patient location during evaluation: bedside  Patient participation: complete - patient participated  Level of consciousness: awake  Pain management: adequate    Airway patency: patent  PONV Status: none  Cardiovascular status: acceptable and stable  Respiratory status: acceptable  Hydration status: acceptable

## 2024-07-26 NOTE — INTERVAL H&P NOTE
H&P reviewed. The patient was examined and there are no changes to the H&P.    Patient is not tachycardic  Respirations are non elaborated  Vitals:    07/26/24 0835   BP: 129/71   Pulse: 57   Resp: (!) 57   Temp: 97.6 °F (36.4 °C)   SpO2: 97%     Risks and benefits reminded to patient who agrees to proceed

## 2024-07-26 NOTE — OP NOTE
Plastic Surgery Op Note  RIGHT BREAST IMPLANT EXCHANGE, PARTIAL CAPSULOTOMY AND DEBRIDEMENT     Mariela Polanco  7/26/2024    Pre-op Diagnosis:   Breast reconstruction deformity [N65.0]    Post-Op Diagnosis Codes:     * Breast reconstruction deformity [N65.0]      Anesthesia: General    Staff:   Jackyulator: Radha Casey RN  Scrub Person: Agustina Rodriguez  Assistant: Cindy Merida RN CSA    Surgeon: Dr Wilkinson  First Assistant: MARLA Cassidy who was instrumental in helping with hemostasis, visualization and retraction structures as well as surgical closure.  Her skilled assistance was necessary for the success of this case.      Estimated Blood Loss: 50 mL    Specimens:   Order Name Source Comment Collection Info Order Time   NICOTINE SCREEN, URINE Urine, Clean Catch  Collected By: Abelardo Gifford 7/26/2024  8:25 AM     Release to patient   Routine Release        TISSUE PATHOLOGY EXAM Breast, Right  Collected By: Maryse Wilkinson MD 7/26/2024 10:57 AM     Release to patient   Routine Release              Drains:   Closed/Suction Drain 1 Right Breast Bulb 15 Fr. (Active)   Dressing Status Clean;Dry 07/26/24 1147   Drainage Appearance Bloody 07/26/24 1147       Findings:   Capsular contracture and right ruptured implant   Debridement was performed with a curette on the whole pocket to the muscle 15x 15 cm      Implants:      Right:   Explanted:  Smooth round gel implant  - ruptured     Implanted:  Smooth round gel implant        Left:  No implants were removed     Complications: none     After risks and benefits were discussed with patient and informed consent was signed, patient was taken to the OR and positioned supine. The surgical site was then prepped in a sterile fashion way and a time out was performed confirming patient's name and procedure to be performed. All surgical team was introduced by name.   I incised the right breast scar and removed the implant , that was ruptured.  Nerve  block was performed. Then, we inspected the cavity and performed a debridement of the whole pocket with a curette. I then performed a partial capsulotomy mostly at the inferior half of the pocket.   Then, the pocket was inspected and appeared good. I then irrigated with saline and irrisept. A drain was placed and a new implant was placed with a morejon funnel. The incision was closed with 2-0 PDS followed by 3-0 vicryl x 2. Surgical glue system was applied to the breast.     Patient tolerated procedure well, there were no complications, all instruments were checked and patient was awakened and taken to PACU in stable condition.  I was present for the entire procedure.     Date: 7/26/2024  Time: 11:48 EDT           Maryse Wilkinson MD  07/26/24  11:48 EDT

## 2024-07-27 LAB
QT INTERVAL: 433 MS
QTC INTERVAL: 437 MS

## 2024-07-29 LAB
CYTO UR: NORMAL
LAB AP CASE REPORT: NORMAL
LAB AP CLINICAL INFORMATION: NORMAL
PATH REPORT.FINAL DX SPEC: NORMAL
PATH REPORT.GROSS SPEC: NORMAL

## 2024-08-01 ENCOUNTER — OFFICE VISIT (OUTPATIENT)
Dept: PLASTIC SURGERY | Facility: CLINIC | Age: 59
End: 2024-08-01
Payer: COMMERCIAL

## 2024-08-01 VITALS
DIASTOLIC BLOOD PRESSURE: 86 MMHG | SYSTOLIC BLOOD PRESSURE: 147 MMHG | OXYGEN SATURATION: 94 % | HEART RATE: 68 BPM | BODY MASS INDEX: 25.97 KG/M2 | TEMPERATURE: 98 F | HEIGHT: 66 IN | WEIGHT: 161.6 LBS

## 2024-08-01 DIAGNOSIS — N65.0 BREAST RECONSTRUCTION DEFORMITY: Primary | ICD-10-CM

## 2024-08-01 PROCEDURE — 99024 POSTOP FOLLOW-UP VISIT: CPT | Performed by: SURGERY

## 2024-08-06 ENCOUNTER — TELEPHONE (OUTPATIENT)
Dept: PLASTIC SURGERY | Facility: CLINIC | Age: 59
End: 2024-08-06
Payer: COMMERCIAL

## 2024-08-06 NOTE — TELEPHONE ENCOUNTER
SPOKE WITH PATIENT AND THEY ARE GOING TO GO TO DR. ROBINS'S OFFICE TO HAVE THEIR DRAIN PULLED AND THEN WILL SEE US IN A MONTH FOR HER 1 MONTH POST OP APPOINTMENT WITH DR. ZUÑIGA.

## 2024-08-07 ENCOUNTER — CLINICAL SUPPORT (OUTPATIENT)
Dept: SURGERY | Facility: CLINIC | Age: 59
End: 2024-08-07
Payer: COMMERCIAL

## 2024-08-07 ENCOUNTER — TELEPHONE (OUTPATIENT)
Dept: SURGERY | Facility: CLINIC | Age: 59
End: 2024-08-07
Payer: COMMERCIAL

## 2024-08-07 VITALS — DIASTOLIC BLOOD PRESSURE: 82 MMHG | WEIGHT: 162.2 LBS | BODY MASS INDEX: 26.19 KG/M2 | SYSTOLIC BLOOD PRESSURE: 134 MMHG

## 2024-08-07 NOTE — PROGRESS NOTES
This is a 58 year old patient that came into the office for removal of drain in chest. A time-out was preformed and allergies were verified. With patient placed in a supine position, using with clean gloves and adhesive removal, dressing was removed from drain. Bulb was opened to let air in and with sterile scissors the suture keeping the drain in placed was cut. Drain was pulled entirely from incision, leaving a 1 cm incision with clean edges, no areas of apparent infection. Patient tolerated well procedure, triple antibiotic ointment was placed around the incision, a clean gauze and Tegaderm were placed on top. Wound care instructions were given, patient verbalized understanding.

## 2024-08-07 NOTE — TELEPHONE ENCOUNTER
Dr Wilkinson's office called re: drain.  Dr. Wilkinson told pt she could have her drain removed however, Dr Wilkinson is an hr and half from pt.    Their office stated pt called here for us to remove and was told we could not do.    Is it ok with you if the MA's remove pts drain here so she can avoid the long drive to Dr Wilkinson?    Pls advise and I can call pt and set something up if ok.

## 2024-09-12 ENCOUNTER — TELEPHONE (OUTPATIENT)
Dept: PLASTIC SURGERY | Facility: CLINIC | Age: 59
End: 2024-09-12
Payer: COMMERCIAL

## 2024-09-16 ENCOUNTER — TELEPHONE (OUTPATIENT)
Dept: PLASTIC SURGERY | Facility: CLINIC | Age: 59
End: 2024-09-16
Payer: COMMERCIAL

## 2024-09-19 ENCOUNTER — OFFICE VISIT (OUTPATIENT)
Dept: PLASTIC SURGERY | Facility: CLINIC | Age: 59
End: 2024-09-19
Payer: COMMERCIAL

## 2024-09-19 ENCOUNTER — OFFICE VISIT (OUTPATIENT)
Dept: INTERNAL MEDICINE | Facility: CLINIC | Age: 59
End: 2024-09-19
Payer: COMMERCIAL

## 2024-09-19 VITALS
BODY MASS INDEX: 26.52 KG/M2 | HEIGHT: 66 IN | SYSTOLIC BLOOD PRESSURE: 114 MMHG | OXYGEN SATURATION: 94 % | DIASTOLIC BLOOD PRESSURE: 76 MMHG | HEART RATE: 78 BPM | WEIGHT: 165 LBS

## 2024-09-19 VITALS
HEIGHT: 66 IN | WEIGHT: 165.6 LBS | DIASTOLIC BLOOD PRESSURE: 92 MMHG | SYSTOLIC BLOOD PRESSURE: 142 MMHG | BODY MASS INDEX: 26.61 KG/M2

## 2024-09-19 DIAGNOSIS — Z98.890 S/P BREAST RECONSTRUCTION, BILATERAL: Primary | ICD-10-CM

## 2024-09-19 DIAGNOSIS — R06.83 SNORING: ICD-10-CM

## 2024-09-19 DIAGNOSIS — I10 ESSENTIAL HYPERTENSION: Primary | Chronic | ICD-10-CM

## 2024-09-19 DIAGNOSIS — Z23 NEED FOR INFLUENZA VACCINATION: ICD-10-CM

## 2024-09-19 DIAGNOSIS — R73.03 PRE-DIABETES: ICD-10-CM

## 2024-09-19 DIAGNOSIS — R53.83 FATIGUE, UNSPECIFIED TYPE: ICD-10-CM

## 2024-09-19 LAB
BUN SERPL-MCNC: 12 MG/DL (ref 6–20)
BUN/CREAT SERPL: 14.3 (ref 7–25)
CALCIUM SERPL-MCNC: 9.7 MG/DL (ref 8.6–10.5)
CHLORIDE SERPL-SCNC: 104 MMOL/L (ref 98–107)
CO2 SERPL-SCNC: 24.7 MMOL/L (ref 22–29)
CREAT SERPL-MCNC: 0.84 MG/DL (ref 0.57–1)
EGFRCR SERPLBLD CKD-EPI 2021: 81.2 ML/MIN/1.73
GLUCOSE SERPL-MCNC: 108 MG/DL (ref 65–99)
POTASSIUM SERPL-SCNC: 4.8 MMOL/L (ref 3.5–5.2)
SODIUM SERPL-SCNC: 140 MMOL/L (ref 136–145)
T4 FREE SERPL-MCNC: 1.28 NG/DL (ref 0.92–1.68)
TSH SERPL DL<=0.005 MIU/L-ACNC: 0.86 UIU/ML (ref 0.27–4.2)

## 2024-09-19 PROCEDURE — 99214 OFFICE O/P EST MOD 30 MIN: CPT | Performed by: INTERNAL MEDICINE

## 2024-09-19 PROCEDURE — 90656 IIV3 VACC NO PRSV 0.5 ML IM: CPT | Performed by: INTERNAL MEDICINE

## 2024-09-19 PROCEDURE — 90471 IMMUNIZATION ADMIN: CPT | Performed by: INTERNAL MEDICINE

## 2024-09-19 RX ORDER — AMLODIPINE BESYLATE 10 MG/1
10 TABLET ORAL DAILY
Qty: 30 TABLET | Refills: 2 | Status: SHIPPED | OUTPATIENT
Start: 2024-09-19

## 2024-10-17 ENCOUNTER — OFFICE VISIT (OUTPATIENT)
Dept: INTERNAL MEDICINE | Facility: CLINIC | Age: 59
End: 2024-10-17
Payer: COMMERCIAL

## 2024-10-17 VITALS
BODY MASS INDEX: 26.84 KG/M2 | DIASTOLIC BLOOD PRESSURE: 82 MMHG | HEIGHT: 66 IN | WEIGHT: 167 LBS | SYSTOLIC BLOOD PRESSURE: 132 MMHG

## 2024-10-17 DIAGNOSIS — R73.03 PRE-DIABETES: ICD-10-CM

## 2024-10-17 DIAGNOSIS — I10 ESSENTIAL HYPERTENSION: Primary | Chronic | ICD-10-CM

## 2024-10-17 PROCEDURE — 99214 OFFICE O/P EST MOD 30 MIN: CPT | Performed by: INTERNAL MEDICINE

## 2024-10-17 RX ORDER — SEMAGLUTIDE 0.25 MG/.5ML
0.25 INJECTION, SOLUTION SUBCUTANEOUS WEEKLY
Qty: 2 ML | Refills: 0 | Status: SHIPPED | OUTPATIENT
Start: 2024-10-17

## 2024-10-17 RX ORDER — SEMAGLUTIDE 0.5 MG/.5ML
0.5 INJECTION, SOLUTION SUBCUTANEOUS WEEKLY
Qty: 2 ML | Refills: 0 | Status: SHIPPED | OUTPATIENT
Start: 2024-10-17

## 2024-10-17 NOTE — PROGRESS NOTES
Subjective        Chief Complaint   Patient presents with    Hypertension           Mariela Polanco is a 59 y.o. female who presents for    Patient Active Problem List   Diagnosis    Essential hypertension    Depression    Gastroesophageal reflux disease    Pre-diabetes    Diverticulosis    Ductal carcinoma in situ (DCIS) of right breast    S/P breast reconstruction, bilateral    Breast reconstruction deformity    BMI 27.0-27.9,adult       History of Present Illness       She denies chest pain or dyspnea. Her BP has been 177/70. She did not sleep well last night. She does not add salt to her food. She 2-3 drinks per week.     She took wegovy about 2 years ago. She went to Dr. Mesa. She has kept most of the wt off.   Allergies   Allergen Reactions    Penicillins Other (See Comments)     Childhood allergy       Current Outpatient Medications on File Prior to Visit   Medication Sig Dispense Refill    amLODIPine (NORVASC) 10 MG tablet Take 1 tablet by mouth Daily. 30 tablet 2    buPROPion XL (WELLBUTRIN XL) 300 MG 24 hr tablet Take 1 tablet by mouth once daily (Patient taking differently: Take 1 tablet by mouth Every Night.) 90 tablet 3    traZODone (DESYREL) 50 MG tablet TAKE 1 TABLET BY MOUTH ONCE DAILY AT NIGHT 90 tablet 1     No current facility-administered medications on file prior to visit.       Past Medical History:   Diagnosis Date    Depression     H/O Pneumonia     History of diverticulitis     Insomnia     Iron deficiency anemia 07/22/2019    Steatosis, liver     Vitamin D deficiency        Past Surgical History:   Procedure Laterality Date    BREAST BIOPSY Right 2023    BREAST RECONSTRUCTION Bilateral 4/24/2023    Procedure: BREAST RECONSTRUCTION, bilateral breast reconstruction with implant, mesh, use of spy;  Surgeon: Maryse Wilkinson MD;  Location: [facility];  Service: Plastics;  Laterality: Bilateral;    BREAST RECONSTRUCTION Bilateral 12/26/2023    Procedure: Bilateral Breast  reconstruction revision, with bilateral capsulectomy, and bilateral nipple reconstruction;  Surgeon: Maryse Wilkinson MD;  Location: [facility];  Service: Plastics;  Laterality: Bilateral;    BREAST RECONSTRUCTION Right 2024    Procedure: BREAST RECONSTRUCTION REVISION, Right  Breast reconstruction revision right breast capsulotomy  and  implant exchange;  Surgeon: Maryse Wilkinson MD;  Location: [facility] OR [facility];  Service: Plastics;  Laterality: Right;     SECTION      CHOLECYSTECTOMY      COLONOSCOPY  10/28/2016    nl with Dr. Everett Botello    COLONOSCOPY N/A 2023    Procedure: COLONOSCOPY FOR SCREENING;  Surgeon: Dakota Nowak MD;  [place] [facility] [place];  Service: Gastroenterology;  Laterality: N/A;  Diverticulosis, Hemorrhoids    ENDOSCOPY N/A 05/10/2021    Procedure: ESOPHAGOGASTRODUODENOSCOPY WITH BIOPSY;  Surgeon: Dakota Nowak MD;  [place] [facility] [place];  Service: Gastroenterology;  Laterality: N/A;  GASTRIC POLYPS, SMALL HIATAL HERNIA, ESOPHAGITIS    FAT GRAFTING Bilateral 2023    Procedure: FAT GRAFTING;  Surgeon: Maryse Wilkinson MD;  Location: [facility];  Service: Plastics;  Laterality: Bilateral;    MASTECTOMY W/ SENTINEL NODE BIOPSY Right 2023    Procedure: bilateral breast skin sparing mastectomy with right sentinel lymph node biopsy.;  Surgeon: Miladis Mccormack MD;  Location: [facility] MAIN OR;  Service: General;  Laterality: Right;    UPPER GASTROINTESTINAL ENDOSCOPY         Family History   Problem Relation Age of Onset    Other Mother         ESRD (end stage renal disease)    Heart failure Mother         chronic congestive heart failure    Diabetes Mother     Hyperlipidemia Mother     Hypertension Mother     Pneumonia Mother     Skin cancer Mother     Diverticulitis Father     Skin cancer Father     Colon polyps Father     Asthma Father     Diabetes Sister     Atrial fibrillation Sister     Diabetes Brother     Cancer Maternal  "Aunt         possibly ovarian    Crohn's disease Other     Breast cancer Neg Hx     Colon cancer Neg Hx     Irritable bowel syndrome Neg Hx     Ulcerative colitis Neg Hx     Malig Hyperthermia Neg Hx        Social History     Socioeconomic History    Marital status:    Tobacco Use    Smoking status: Never     Passive exposure: Never    Smokeless tobacco: Never   Vaping Use    Vaping status: Never Used   Substance and Sexual Activity    Alcohol use: Yes     Alcohol/week: 3.0 standard drinks of alcohol     Types: 3 Glasses of wine per week     Comment: social    Drug use: Never    Sexual activity: Yes     Partners: Male     Birth control/protection: Post-menopausal, Tubal ligation           The following portions of the patient's history were reviewed and updated as appropriate: problem list, allergies, current medications, past medical history, past family history, past social history, and past surgical history.    Review of Systems    Immunization History   Administered Date(s) Administered    COVID-19 (MODERNA) 12YRS+ (SPIKEVAX) 10/06/2024    COVID-19 (MODERNA) 1st,2nd,3rd Dose Monovalent 03/11/2021, 04/08/2021, 11/15/2021, 05/10/2022    COVID-19 (MODERNA) BIVALENT 12+YRS 10/31/2022    Flu Vaccine Intradermal Quad 18-64YR 11/27/2017, 11/12/2018, 09/15/2020    Flu Vaccine Quad PF >36MO 12/27/2019, 12/07/2021    Fluzone  >6mos 09/19/2024    Fluzone (or Fluarix & Flulaval for VFC) >6mos 12/27/2019    Hepatitis A 12/19/2017, 11/12/2018    Hepatitis B Adult/Adolescent IM 03/01/2018, 05/11/2018, 12/10/2018    Influenza, Unspecified 10/31/2022    Shingrix 12/07/2021    Tdap 11/27/2017    Typhoid, Unspecified 12/19/2017       Objective   Vitals:    10/17/24 1031   BP: 132/82   Weight: 75.8 kg (167 lb)   Height: 167.6 cm (65.98\")     Body mass index is 26.97 kg/m².  Physical Exam  Vitals reviewed.   Constitutional:       Appearance: She is well-developed.   HENT:      Head: Normocephalic and atraumatic. "   Cardiovascular:      Rate and Rhythm: Normal rate and regular rhythm.      Heart sounds: Normal heart sounds, S1 normal and S2 normal.   Pulmonary:      Effort: Pulmonary effort is normal.      Breath sounds: Normal breath sounds.   Skin:     General: Skin is warm.   Neurological:      Mental Status: She is alert.   Psychiatric:         Behavior: Behavior normal.         Procedures    Assessment & Plan   Diagnoses and all orders for this visit:    1. Essential hypertension (Primary)    2. Pre-diabetes  -     Comprehensive Metabolic Panel; Future  -     Hemoglobin A1c; Future  -     Semaglutide-Weight Management (Wegovy) 0.25 MG/0.5ML solution auto-injector; Inject 0.5 mL under the skin into the appropriate area as directed 1 (One) Time Per Week.  Dispense: 2 mL; Refill: 0  -     Semaglutide-Weight Management (Wegovy) 0.5 MG/0.5ML solution auto-injector; Inject 0.5 mL under the skin into the appropriate area as directed 1 (One) Time Per Week.  Dispense: 2 mL; Refill: 0    3. BMI 27.0-27.9,adult  -     Semaglutide-Weight Management (Wegovy) 0.25 MG/0.5ML solution auto-injector; Inject 0.5 mL under the skin into the appropriate area as directed 1 (One) Time Per Week.  Dispense: 2 mL; Refill: 0  -     Semaglutide-Weight Management (Wegovy) 0.5 MG/0.5ML solution auto-injector; Inject 0.5 mL under the skin into the appropriate area as directed 1 (One) Time Per Week.  Dispense: 2 mL; Refill: 0                 She will bring in her Bp monitor next time. She would like to r/s wegovy. Denies FH of thyroid CA.  Return in about 2 months (around 12/17/2024) for Lab Before FUP.

## 2024-11-07 DIAGNOSIS — R73.03 PRE-DIABETES: ICD-10-CM

## 2024-11-08 RX ORDER — SEMAGLUTIDE 0.5 MG/.5ML
0.5 INJECTION, SOLUTION SUBCUTANEOUS WEEKLY
Qty: 2 ML | Refills: 3 | Status: SHIPPED | OUTPATIENT
Start: 2024-11-08

## 2024-12-05 ENCOUNTER — OFFICE VISIT (OUTPATIENT)
Dept: SLEEP MEDICINE | Facility: HOSPITAL | Age: 59
End: 2024-12-05
Payer: COMMERCIAL

## 2024-12-05 VITALS
SYSTOLIC BLOOD PRESSURE: 121 MMHG | DIASTOLIC BLOOD PRESSURE: 80 MMHG | BODY MASS INDEX: 26.36 KG/M2 | HEIGHT: 66 IN | HEART RATE: 65 BPM | OXYGEN SATURATION: 97 % | WEIGHT: 164 LBS

## 2024-12-05 DIAGNOSIS — R06.83 SNORING: Primary | ICD-10-CM

## 2024-12-05 DIAGNOSIS — G47.8 NON-RESTORATIVE SLEEP: ICD-10-CM

## 2024-12-05 DIAGNOSIS — G47.19 EXCESSIVE DAYTIME SLEEPINESS: ICD-10-CM

## 2024-12-05 DIAGNOSIS — G47.30 OBSERVED SLEEP APNEA: ICD-10-CM

## 2024-12-05 DIAGNOSIS — I10 ESSENTIAL HYPERTENSION: Chronic | ICD-10-CM

## 2024-12-05 PROCEDURE — G0463 HOSPITAL OUTPT CLINIC VISIT: HCPCS

## 2024-12-05 NOTE — PROGRESS NOTES
[facility]  Sleep Medicine   [address]  [place] [address]  Phone   Fax       Mariela Amezquitaneal  [id number]   1965  59 y.o.  female      Referring physician/provider and PCP Kuldeep Robles MD    Type of service: Initial Sleep Medicine Consult.  Date of service: 12/5/2024      Chief Complaint   Patient presents with    Witnessed Apnea    Snoring    Non-restorative Sleep    Fatigue    Dry Mouth    Daytime Sleepiness       History of present illness;  Thank you for asking to see Mariela LOPEZ Collin, 59 y.o.. The patient was seen today on 12/5/2024 at [facility].  The patient presents today with symptoms of snoring, non-restorative sleep and witnessed apneas. The symptoms are present for few years and they are persistent in nature.  The snoring is present in all positions and it is loud.  Patient has no prior surgery namely tonsillectomy, nasal surgery and UPPP.  She works as a consultant for business.  She travels once a month and feels tired and exhausted when she wakes up.    Patient gives the following sleep history.  Sleep schedule:  Bedtime: 10 PM   Wake time: 6 AM  Normally takes about 5-10 minutes to fall asleep  Average hours of sleep 7-8  Number of naps per day 0  Symptoms   In addition to snoring, nonrestorative sleep and witnessed apneas patient gives the following associated symptoms.  Have you ever awakened gasping for breath, coughing, choking: Yes   Change in weight,  Yes lost 15 pounds  Morning headaches  Yes   Awaken with a sore throat or dry mouth  Yes   Leg jerking at night:  No   Crawly feeling/urge sensation to move in the legs: Yes   Teeth grinding:Yes   Have you ever awakened at night with a sour taste or burning sensation in your chest:  No   Do you have muscle weakness with laughing or anger or sleep paralysis:  No   Have you ever felt paralyzed while going to sleep or waking up:  No   Sleepwalking,  "nightmares, No   Nocturia (urination at night): 2 times per night  Memory Problem:No     MEDICAL CONDITIONS (PMH)   Hypertension  Depression    Social history:  Do you drive a commercial vehicle:  No   Shift work:  No   Tobacco use:  No   Alcohol use: 3 per week  Caffeinated drinks: 1    Family Hx (parents and siblings) (pertaining to sleep medicine)  Sleep apnea, brother and father    Medications: reviewed    Review of systems:  Positive symptoms are :  Snoring  Witnessed apnea  Daytime excessive sleepiness with [place] Sleepiness Scale of Total score: 8   Fatigue  Morning headache      Physical exam:  CONSTITUTINONAL:  Vitals:    12/05/24 0900   BP: 121/80   Pulse: 65   SpO2: 97%   Weight: 74.4 kg (164 lb)   Height: 167.6 cm (66\")    Body mass index is 26.47 kg/m².   NOSE:no nasal septal defects, nasal passages are clear, no nasal polyps,   THROAT: tonsils are not enlarged, tongue normal size, oral airway Mallampati class 2  NECK:Neck Circumference: 14.5 inches, trachea is in the midline, thyroid not enlarged  RESPIRATORY SYSTEM: Breath sounds are normal, there are no wheezes  CARDIOVASULAR SYSTEM: Heart sounds are regular rhythm and normal rate, no edema  NEUROLOGICAL SYSTEM: Oriented x 3, No speech defect, gait is normal  PSYCHIATRIC SYSTEM: Mood is normal, thought content is normal    Office notes from care team reviewed. Office note dated September 19, 2024,reviewed  Labs reviewed.  TSH Results:  TSH          9/19/2024    09:20   TSH   TSH 0.858       Most Recent A1C          7/17/2024    10:52   HGBA1C Most Recent   Hemoglobin A1C 5.7         Assessment and plan:  Witnessed apneas,(R06.81) patient's symptoms and examination is consistent with sleep apnea (G47.30). I have talked to the patient about the signs and symptoms of sleep apnea. In addition, I have also discussed pathophysiology of sleep apnea.  I also discussed the complications of untreated sleep apnea including effects on hypertension, diabetes " mellitus and nonrestorative sleep with hypersomnia which can increase risk for motor vehicle accidents.  Untreated sleep apnea is also a risk factor for development of atrial fibrillation, pulmonary hypertension, and insulin resistance and stroke.  Discussed in detail of various testing methods including home-based and lab based sleep studies.  Based on history and physical examination and other comorbidities the most appropriate study is home sleep test.  The order for the sleep study is placed in [facility].  The test will be scheduled after approval from insurance. Treatment and management will be discussed after the test is completed.  Patient was given opportunity to ask questions and all the questions were answered.   Snoring (R06.83), snoring is the sound created by turbulent airflow vibrating upper airway soft tissue due to limitation of inspiratory airflow. I have also discussed factors affecting snoring including sleep deprivation, sleeping on the back and alcohol ingestion. To minimize snoring, patient is advised to have adequate sleep, sleep on the side and avoid alcohol and sedative medications before bedtime  Daytime excessive sleepiness .  It was assessed with [place] Sleepiness Scale of Total score: 8.  There are many causes for daytime excessive sleepiness including sleep depression, shiftwork syndrome, depression and other medical disorders including heart, kidney and liver failure.  The most serious cause of excessive sleepiness is due to neurological conditions like narcolepsy/cataplexy.  But the most common cause of excessive sleepiness is due to sleep apnea with frequent awakenings during sleep time.  I have discussed safety of driving and to remain vigilant while driving.  Morning,    Hypertension    Return for 31 to 90 days after PAP setup with down load..  Patient's questions were answered    I once again thank you for asking me to see this patient in consultation and I have forwarded my opinion  and treatment plan.  Please do not hesitate to call me if you have any questions.   12/5/2024  Kaela Chu MD  Sleep Medicine  Medical Director  [facility]: [facility]

## 2024-12-17 ENCOUNTER — OFFICE VISIT (OUTPATIENT)
Dept: INTERNAL MEDICINE | Facility: CLINIC | Age: 59
End: 2024-12-17
Payer: COMMERCIAL

## 2024-12-17 VITALS
SYSTOLIC BLOOD PRESSURE: 134 MMHG | DIASTOLIC BLOOD PRESSURE: 88 MMHG | BODY MASS INDEX: 25.94 KG/M2 | HEIGHT: 66 IN | WEIGHT: 161.4 LBS

## 2024-12-17 DIAGNOSIS — R73.03 PRE-DIABETES: ICD-10-CM

## 2024-12-17 DIAGNOSIS — E66.3 OVERWEIGHT (BMI 25.0-29.9): ICD-10-CM

## 2024-12-17 DIAGNOSIS — I10 ESSENTIAL HYPERTENSION: Primary | Chronic | ICD-10-CM

## 2024-12-17 LAB — HBA1C MFR BLD: 5.4 % (ref 4.8–5.6)

## 2024-12-17 PROCEDURE — 99214 OFFICE O/P EST MOD 30 MIN: CPT | Performed by: INTERNAL MEDICINE

## 2024-12-17 RX ORDER — SEMAGLUTIDE 0.5 MG/.5ML
0.5 INJECTION, SOLUTION SUBCUTANEOUS WEEKLY
Qty: 2 ML | Refills: 5 | Status: SHIPPED | OUTPATIENT
Start: 2024-12-17

## 2024-12-17 NOTE — PROGRESS NOTES
Subjective        Chief Complaint   Patient presents with    Hypertension           Mariela Polanco is a 59 y.o. female who presents for    Patient Active Problem List   Diagnosis    Essential hypertension    Depression    Gastroesophageal reflux disease    Pre-diabetes    Diverticulosis    Ductal carcinoma in situ (DCIS) of right breast    S/P breast reconstruction, bilateral    Breast reconstruction deformity    BMI 27.0-27.9,adult    Snoring    Observed sleep apnea    Non-restorative sleep    Excessive daytime sleepiness       History of Present Illness     Her BP has been 118-120/78. Her insurance has covered wegovy. She is eating healthier. She exercises twice per week.     Allergies   Allergen Reactions    Penicillins Other (See Comments)     Childhood allergy       Current Outpatient Medications on File Prior to Visit   Medication Sig Dispense Refill    amLODIPine (NORVASC) 10 MG tablet Take 1 tablet by mouth Daily. 30 tablet 2    buPROPion XL (WELLBUTRIN XL) 300 MG 24 hr tablet Take 1 tablet by mouth once daily (Patient taking differently: Take 1 tablet by mouth Every Night.) 90 tablet 3    traZODone (DESYREL) 50 MG tablet TAKE 1 TABLET BY MOUTH ONCE DAILY AT NIGHT 90 tablet 1    [DISCONTINUED] Semaglutide-Weight Management (Wegovy) 0.25 MG/0.5ML solution auto-injector Inject 0.5 mL under the skin into the appropriate area as directed 1 (One) Time Per Week. 2 mL 0    [DISCONTINUED] Semaglutide-Weight Management (Wegovy) 0.5 MG/0.5ML solution auto-injector Inject 0.5 mL under the skin into the appropriate area as directed 1 (One) Time Per Week. 2 mL 3     No current facility-administered medications on file prior to visit.       Past Medical History:   Diagnosis Date    Depression     H/O Pneumonia     History of diverticulitis     Insomnia     Iron deficiency anemia 07/22/2019    Steatosis, liver     Vitamin D deficiency        Past Surgical History:   Procedure Laterality Date    BREAST BIOPSY Right 2023     BREAST RECONSTRUCTION Bilateral 2023    Procedure: BREAST RECONSTRUCTION, bilateral breast reconstruction with implant, mesh, use of spy;  Surgeon: Maryse Wilkinson MD;  Location: [facility] OR;  Service: Plastics;  Laterality: Bilateral;    BREAST RECONSTRUCTION Bilateral 2023    Procedure: Bilateral Breast reconstruction revision, with bilateral capsulectomy, and bilateral nipple reconstruction;  Surgeon: Maryse Wilkinson MD;  Location: [facility] [place] [facility] [place];  Service: Plastics;  Laterality: Bilateral;    BREAST RECONSTRUCTION Right 2024    Procedure: BREAST RECONSTRUCTION REVISION, Right  Breast reconstruction revision right breast capsulotomy  and  implant exchange;  Surgeon: Maryse Wilkinson MD;  Location: [facility] [place];  Service: Plastics;  Laterality: Right;     SECTION      CHOLECYSTECTOMY      COLONOSCOPY  10/28/2016    nl with Dr. Everett Botello    COLONOSCOPY N/A 2023    Procedure: COLONOSCOPY FOR SCREENING;  Surgeon: Dakota Nowak MD;  Location: [facility] OR;  Service: Gastroenterology;  Laterality: N/A;  Diverticulosis, Hemorrhoids    ENDOSCOPY N/A 05/10/2021    Procedure: ESOPHAGOGASTRODUODENOSCOPY WITH BIOPSY;  Surgeon: Dakota Nowak MD;  Location: [facility] OR;  Service: Gastroenterology;  Laterality: N/A;  GASTRIC POLYPS, SMALL HIATAL HERNIA, ESOPHAGITIS    FAT GRAFTING Bilateral 2023    Procedure: FAT GRAFTING;  Surgeon: Maryse Wilkinson MD;  Location: [facility] [place];  Service: Plastics;  Laterality: Bilateral;    MASTECTOMY W/ SENTINEL NODE BIOPSY Right 2023    Procedure: bilateral breast skin sparing mastectomy with right sentinel lymph node biopsy.;  Surgeon: Miladis Mccormack MD;  Location: [facility] MAIN OR;  Service: General;  Laterality: Right;    UPPER GASTROINTESTINAL ENDOSCOPY         Family History   Problem Relation Age of Onset    Other Mother         ESRD (end stage renal disease)    Heart failure Mother          chronic congestive heart failure    Diabetes Mother     Hyperlipidemia Mother     Hypertension Mother     Pneumonia Mother     Skin cancer Mother     Diverticulitis Father     Skin cancer Father     Colon polyps Father     Asthma Father     Diabetes Sister     Atrial fibrillation Sister     Diabetes Brother     Cancer Maternal Aunt         possibly ovarian    Crohn's disease Other     Breast cancer Neg Hx     Colon cancer Neg Hx     Irritable bowel syndrome Neg Hx     Ulcerative colitis Neg Hx     Malig Hyperthermia Neg Hx        Social History     Socioeconomic History    Marital status:    Tobacco Use    Smoking status: Never     Passive exposure: Never    Smokeless tobacco: Never   Vaping Use    Vaping status: Never Used   Substance and Sexual Activity    Alcohol use: Yes     Alcohol/week: 3.0 standard drinks of alcohol     Types: 3 Glasses of wine per week     Comment: social    Drug use: Never    Sexual activity: Yes     Partners: Male     Birth control/protection: Post-menopausal, Tubal ligation           The following portions of the patient's history were reviewed and updated as appropriate: problem list, allergies, current medications, past medical history, past family history, past social history, and past surgical history.    Review of Systems    Immunization History   Administered Date(s) Administered    COVID-19 (MODERNA) 12YRS+ (SPIKEVAX) 10/06/2024    COVID-19 (MODERNA) 1st,2nd,3rd Dose Monovalent 03/11/2021, 04/08/2021, 11/15/2021, 05/10/2022    COVID-19 (MODERNA) BIVALENT 12+YRS 10/31/2022    Flu Vaccine Intradermal Quad 18-64YR 11/27/2017, 11/12/2018, 09/15/2020    Flu Vaccine Quad PF >36MO 12/27/2019, 12/07/2021    Fluzone  >6mos 09/19/2024    Fluzone (or Fluarix & Flulaval for VFC) >6mos 12/27/2019    Hepatitis A 12/19/2017, 11/12/2018    Hepatitis B Adult/Adolescent IM 03/01/2018, 05/11/2018, 12/10/2018    Influenza, Unspecified 10/31/2022    Shingrix 12/07/2021    Tdap 11/27/2017     "Typhoid, Unspecified 12/19/2017       Objective   Vitals:    12/17/24 0924   BP: 134/88   Weight: 73.2 kg (161 lb 6.4 oz)   Height: 167.6 cm (65.98\")     Body mass index is 26.06 kg/m².  Physical Exam  Vitals reviewed.   Constitutional:       Appearance: She is well-developed.   HENT:      Head: Normocephalic and atraumatic.   Cardiovascular:      Rate and Rhythm: Normal rate and regular rhythm.      Heart sounds: Normal heart sounds, S1 normal and S2 normal.   Pulmonary:      Effort: Pulmonary effort is normal.      Breath sounds: Normal breath sounds.   Skin:     General: Skin is warm.   Neurological:      Mental Status: She is alert.   Psychiatric:         Behavior: Behavior normal.         Procedures    Assessment & Plan   Diagnoses and all orders for this visit:    1. Essential hypertension (Primary)  -     Comprehensive Metabolic Panel; Future  -     Lipid Panel With / Chol / HDL Ratio; Future    2. Pre-diabetes  -     Hemoglobin A1c; Future  -     Semaglutide-Weight Management (Wegovy) 0.5 MG/0.5ML solution auto-injector; Inject 0.5 mL under the skin into the appropriate area as directed 1 (One) Time Per Week.  Dispense: 2 mL; Refill: 5    3. Overweight (BMI 25.0-29.9)  -     Semaglutide-Weight Management (Wegovy) 0.5 MG/0.5ML solution auto-injector; Inject 0.5 mL under the skin into the appropriate area as directed 1 (One) Time Per Week.  Dispense: 2 mL; Refill: 5                 She would like to keep wegovy at current dose. Labs today. BP lower at home. She will bring monitor to next visit.  Return in about 6 months (around 6/17/2025) for Lab Today, Lab Before FUP, Annual physical.  "

## 2024-12-22 DIAGNOSIS — G47.00 INSOMNIA, UNSPECIFIED TYPE: ICD-10-CM

## 2024-12-23 RX ORDER — TRAZODONE HYDROCHLORIDE 50 MG/1
TABLET, FILM COATED ORAL
Qty: 90 TABLET | Refills: 1 | Status: SHIPPED | OUTPATIENT
Start: 2024-12-23

## 2025-01-03 NOTE — PATIENT INSTRUCTIONS
We recommend starting a daily fiber supplement such as FiberCon     These can be purchased over-the-counter, generic brand is fine.  Fiber supplements can take 12 to 72 hours to start working. Make sure to drink 6 to 12 ounces of water or noncarbonated beverage with fiber supplement.     Recommended starting with half of product listed daily dose for 7 days to help reduce risk of abdominal bloating/gas and allow your body to acclimate to fiber diet intake.  If you do not experience any of these adverse effects may increase to daily dose listed on product.      For constipation:    We recommend starting MiraLAX. This is available over-the-counter and generic brand is fine.    MiraLAX works best when taken on a regular basis (daily or every other day) to help promote more regular bowel movements.    Typically patients start with 1/2 -1 capful mixed in 8 ounces of noncarbonated liquid and take once or twice daily.    Maximum is 2 full capfuls daily.  Adjust dose based on your response.         Schedule colonoscopy, orders placed.    Follow-up visit after colonoscopy to review findings and make additional recommendations if needed.     Blood work today     Once we receive these results, our office will contact you to discuss updating your treatment plan as indicated     
no

## 2025-01-14 NOTE — PROGRESS NOTES
"Chief Complaint  Post-op Follow-up (6m post op)    Subjective          History of Present Illness  Mariela Polanco is a 59 y.o. female who presents to [facility]GERY for Postoperative Follow-Up of BREAST RECONSTRUCTION REVISION, Right  Breast reconstruction revision right breast capsulotomy  and  implant exchange 7/26/24.    She is 6m post op. Doing well.   Pt states she has would like to verify right breast is normal.  She states left breast is higher and harder than right breast.          History of Present Illness  The patient presents for evaluation of breast implant.    She reports a sensation of warmth in one of her breasts, which she attributes to the recent surgical intervention. The other breast, which has remained untouched since the last procedure approximately 6 months ago, does not exhibit this symptom. She expresses concern about the potential for further stretching of the skin over the implant. Additionally, she mentions that she has not yet proceeded with nipple tattooing due to uncertainty about the final positioning. She recalls previous complications with drainage following a similar procedure and is apprehensive about the possibility of requiring drains again. She also notes a difference in the feel of the two breasts, with one feeling more natural than the other, possibly due to a lesser amount of fat being used in the latter.        Allergies: Penicillins  Allergies Reconciled.    Review of Systems   All review of system has been reviewed and it  is negative except the ones note above.     Objective     /78 (BP Location: Left arm, Patient Position: Sitting, Cuff Size: Adult)   Pulse 81   Ht 167.6 cm (65.98\")   Wt 72.6 kg (160 lb)   SpO2 99%   BMI 25.84 kg/m²     Body mass index is 25.84 kg/m².            Physical Exam        General Inspection: Incision healing well, no swelling, no erythema, no drainage.    Result Review :         "        Assessment and Plan      Diagnoses and all orders for this visit:    1. Breast reconstruction deformity (Primary)        Plan:     Assessment & Plan  1. Breast implant evaluation.  The patient's nipples are in satisfactory condition. The depression in the breast has shown improvement. A comprehensive discussion was held regarding the potential for further skin removal under local anesthesia. This procedure would involve repositioning the nipple upwards, all of which can be accomplished under local anesthesia. The procedure is expected to last approximately 1 hour, after which she will be discharged home. She was informed that there would be no need for drains post-procedure. She expressed a desire to consult with her  before making a final decision.      IOP- right breast skin tailoring and right nipple repositioning- 1 h   22455- revision of reconstructed breast  10133- right nipple reconstruction          Follow Up     No follow-ups on file.    Patient was given instructions and counseling regarding her condition. Please see specific information pulled into the AVS if appropriate.         Patient or patient representative verbalized consent for the use of Ambient Listening during the visit with  Maryse Wilkinson MD for chart documentation. 1/25/2025  10:03 EST

## 2025-01-23 ENCOUNTER — OFFICE VISIT (OUTPATIENT)
Dept: PLASTIC SURGERY | Facility: CLINIC | Age: 60
End: 2025-01-23
Payer: COMMERCIAL

## 2025-01-23 VITALS
DIASTOLIC BLOOD PRESSURE: 78 MMHG | SYSTOLIC BLOOD PRESSURE: 118 MMHG | OXYGEN SATURATION: 99 % | WEIGHT: 160 LBS | BODY MASS INDEX: 25.71 KG/M2 | HEIGHT: 66 IN | HEART RATE: 81 BPM

## 2025-01-23 DIAGNOSIS — N65.0 BREAST RECONSTRUCTION DEFORMITY: Primary | ICD-10-CM

## 2025-02-11 ENCOUNTER — TELEPHONE (OUTPATIENT)
Dept: PLASTIC SURGERY | Facility: CLINIC | Age: 60
End: 2025-02-11

## 2025-02-11 NOTE — TELEPHONE ENCOUNTER
Hub staff attempted to follow warm transfer process and was unsuccessful     Caller: Mariela Polanco    Relationship to patient: Self    Best call back number: [phone]    Patient is needing: PATIENT CALLED TO SPEAK WITH ESTRELLITA ABOUT SCHEDULING FOLLOW UP PROCEDURE WITH DR ZUÑIGA.  PLEASE ADVISE

## 2025-02-16 DIAGNOSIS — I10 ESSENTIAL HYPERTENSION: Chronic | ICD-10-CM

## 2025-02-17 RX ORDER — AMLODIPINE BESYLATE 10 MG/1
10 TABLET ORAL DAILY
Qty: 30 TABLET | Refills: 4 | Status: SHIPPED | OUTPATIENT
Start: 2025-02-17

## 2025-03-13 ENCOUNTER — TELEPHONE (OUTPATIENT)
Dept: SURGERY | Facility: CLINIC | Age: 60
End: 2025-03-13
Payer: COMMERCIAL

## 2025-03-13 NOTE — TELEPHONE ENCOUNTER
Attempted to contact patient to inform appointment has been rescheduled from 5/19 to 5/16 at 830 with Nel Christiansen.Left voicemail.  Advised to call back with any questions

## 2025-04-03 ENCOUNTER — TELEPHONE (OUTPATIENT)
Dept: SURGERY | Facility: CLINIC | Age: 60
End: 2025-04-03
Payer: COMMERCIAL

## 2025-04-03 NOTE — TELEPHONE ENCOUNTER
HUB OK TO READ/SCHD    Attempted to reach patient to reschedule her appointment with Nel Christiansen due to patient traveling. No answer, left VM.

## 2025-05-09 ENCOUNTER — TELEPHONE (OUTPATIENT)
Dept: INTERNAL MEDICINE | Facility: CLINIC | Age: 60
End: 2025-05-09

## 2025-05-09 NOTE — TELEPHONE ENCOUNTER
Cannot get medication to go through to pharmacy[facility] to inform

## 2025-05-09 NOTE — TELEPHONE ENCOUNTER
PATIENT CALLING BACK, WITH FAX NUMBER  -[phone]  [facility] (PHARMACY NAME IN [place])  amLODIPine (NORVASC) 10 MG tablet     CALL BACK NUMBER [phone]

## 2025-05-09 NOTE — TELEPHONE ENCOUNTER
Caller: Ceelora Mariela LOPEZ    Relationship: Self    Best call back number: [phone]     Requested Prescriptions:   Requested Prescriptions      No prescriptions requested or ordered in this encounter        Pharmacy where request should be sent:      Last office visit with prescribing clinician: 12/17/2024   Last telemedicine visit with prescribing clinician: Visit date not found   Next office visit with prescribing clinician: 6/26/2025     Additional details provided by patient: PATIENT IS IN [place], FORGOT HER BLOOD PRESSURE MEDICATION AT HOME.  NOT SURE THE NAME, AS SHE ONLY SAID BLOOD PRESSURE.  SHE IS WANTING TO KNOW IF DR CORONADO WOULD BE ABLE TO FAX A PRESCRIPTION.  WE LOST CONNECTION BEFORE SHE WAS ABLE TO PROVIDE THE FAX NUMBER.  TRIED TO RETURN CALL, BUT HAD TO LEAVE MESSAGE.  HOPEFULL SHE WILL CALL BACK.    Does the patient have less than a 3 day supply:  [x] Yes  [] No    Would you like a call back once the refill request has been completed: [] Yes [] No    If the office needs to give you a call back, can they leave a voicemail: [] Yes [] No    Wiliam Vickers Rep   05/09/25 09:07 EDT     PLEASE ADVISE.

## 2025-06-04 NOTE — PROGRESS NOTES
"Chief Complaint  Procedure (IOP)    Subjective              History of Present Illness  Mariela Polanco is a 59 y.o. female who presents to [facility] PLASTIC & RECONSTRU[facility]IVE SURGERY as a in office procedure for right breast skin tailoring and right nipple repositioning.    History of Present Illness       Allergies: Penicillins  Allergies Reconciled.    Review of Systems   All review of system has been reviewed and it  is negative except the ones note above.     Objective     /76 (BP Location: Left arm, Patient Position: Sitting, Cuff Size: Adult)   Pulse 57   Ht 167.6 cm (65.98\")   Wt 75.8 kg (167 lb)   SpO2 96%   BMI 26.97 kg/m²     Body mass index is 26.97 kg/m².    Physical Exam  Physical Exam       Cardiovascular: Normal rate    Pulmonary/Chest: Effort normal    Face:     Result Review :       Skin tailoring    Date/Time: 6/12/2025 9:16 AM    Performed by: Maryse Wilkinson MD  Authorized by: Maryse Wilkinson MD  Preparation: Patient was prepped and draped in the usual sterile fashion.  Local anesthesia used: yes    Anesthesia:  Local anesthesia used: yes  Local Anesthetic: lidocaine 1% with epinephrine  Anesthetic total: 40 mL    Sedation:  Patient sedated: no    Patient tolerance: patient tolerated the procedure well with no immediate complications           Excision Procedure: Consent obtained. Local injected to site, Lidocaine 1% with epi.  Site prepped with ChloraPrep  in sterile fashion. Site draped in sterile fashion. I dissected down through skin and subcutaneous tissue completely around  excess skin,  after excision of  was sent from field for pathology. Established hemostasis with direct pressure. Site was thoroughly irrigated. Site closed with 3-0 Vicryl in a subcutaneous fashion to obliterate dead space, then with in an interrupted fashion. Site cleaned with sterile normal saline. Steri strips applied. The patient tolerated the procedure well with no " immediate complications.   Size removed: 27x 5 cm          Assessment and Plan    Assessment & Plan       Diagnoses and all orders for this visit:    1. Breast reconstruction deformity (Primary)    2. Personal history of breast cancer    Other orders  -     Skin tailoring        Plan:  Follow up in 3 weeks.           Follow Up     No follow-ups on file.    Patient was given instructions and counseling regarding her condition. Please see specific information pulled into the AVS if appropriate.     Maryse Wilkinson MD  06/12/2025

## 2025-06-08 DIAGNOSIS — G47.00 INSOMNIA, UNSPECIFIED TYPE: ICD-10-CM

## 2025-06-09 RX ORDER — TRAZODONE HYDROCHLORIDE 50 MG/1
50 TABLET ORAL NIGHTLY
Qty: 90 TABLET | Refills: 0 | Status: SHIPPED | OUTPATIENT
Start: 2025-06-09

## 2025-06-12 ENCOUNTER — PROCEDURE VISIT (OUTPATIENT)
Dept: PLASTIC SURGERY | Facility: CLINIC | Age: 60
End: 2025-06-12
Payer: COMMERCIAL

## 2025-06-12 VITALS
SYSTOLIC BLOOD PRESSURE: 129 MMHG | WEIGHT: 167 LBS | HEART RATE: 57 BPM | BODY MASS INDEX: 26.84 KG/M2 | OXYGEN SATURATION: 96 % | DIASTOLIC BLOOD PRESSURE: 76 MMHG | HEIGHT: 66 IN

## 2025-06-12 DIAGNOSIS — Z85.3 PERSONAL HISTORY OF BREAST CANCER: ICD-10-CM

## 2025-06-12 DIAGNOSIS — N65.0 BREAST RECONSTRUCTION DEFORMITY: Primary | ICD-10-CM

## 2025-06-20 ENCOUNTER — LAB (OUTPATIENT)
Dept: LAB | Facility: HOSPITAL | Age: 60
End: 2025-06-20
Payer: COMMERCIAL

## 2025-06-20 LAB
ALBUMIN SERPL-MCNC: 4.7 G/DL (ref 3.5–5.2)
ALBUMIN/GLOB SERPL: 1.4 G/DL
ALP SERPL-CCNC: 60 U/L (ref 39–117)
ALT SERPL W P-5'-P-CCNC: 19 U/L (ref 1–33)
ANION GAP SERPL CALCULATED.3IONS-SCNC: 8.7 MMOL/L (ref 5–15)
AST SERPL-CCNC: 21 U/L (ref 1–32)
BILIRUB SERPL-MCNC: 0.3 MG/DL (ref 0–1.2)
BUN SERPL-MCNC: 14.4 MG/DL (ref 6–20)
BUN/CREAT SERPL: 17.6 (ref 7–25)
CALCIUM SPEC-SCNC: 10 MG/DL (ref 8.6–10.5)
CHLORIDE SERPL-SCNC: 104 MMOL/L (ref 98–107)
CO2 SERPL-SCNC: 27.3 MMOL/L (ref 22–29)
CREAT SERPL-MCNC: 0.82 MG/DL (ref 0.57–1)
EGFRCR SERPLBLD CKD-EPI 2021: 83 ML/MIN/1.73
GLOBULIN UR ELPH-MCNC: 3.4 GM/DL
GLUCOSE SERPL-MCNC: 110 MG/DL (ref 65–99)
POTASSIUM SERPL-SCNC: 4.9 MMOL/L (ref 3.5–5.2)
PROT SERPL-MCNC: 8.1 G/DL (ref 6–8.5)
SODIUM SERPL-SCNC: 140 MMOL/L (ref 136–145)

## 2025-06-20 PROCEDURE — 83036 HEMOGLOBIN GLYCOSYLATED A1C: CPT | Performed by: INTERNAL MEDICINE

## 2025-06-20 PROCEDURE — 80053 COMPREHEN METABOLIC PANEL: CPT | Performed by: INTERNAL MEDICINE

## 2025-06-20 PROCEDURE — 80061 LIPID PANEL: CPT | Performed by: INTERNAL MEDICINE

## 2025-06-25 ENCOUNTER — TREATMENT (OUTPATIENT)
Dept: PHYSICAL THERAPY | Facility: CLINIC | Age: 60
End: 2025-06-25
Payer: COMMERCIAL

## 2025-06-25 DIAGNOSIS — M54.32 SCIATICA, LEFT SIDE: ICD-10-CM

## 2025-06-25 DIAGNOSIS — M54.50 LUMBAR PAIN: Primary | ICD-10-CM

## 2025-06-25 NOTE — PROGRESS NOTES
Physical Therapy Initial Evaluation and Plan of Care  [address], [address]  [place] [address]    Patient: Mariela Polanco   : 1965  Diagnosis/ICD-10 Code:  Lumbar pain [M54.50]  Referring practitioner: Self Referring  Date of Initial Visit: 2025  Today's Date: 2025  Patient seen for 1 session         Visit Diagnoses:    ICD-10-CM ICD-9-CM   1. Lumbar pain  M54.50 724.2   2. Sciatica, left side  M54.32 724.3         Subjective Questionnaire: Oswestry: 26      Subjective Evaluation    History of Present Illness  Mechanism of injury: Patient is a 59 year old female who presents with left hip and LE pain that has bothered her for a couple of weeks.  Reports doing jump pilates which may have aggravated the leg.  States that it feels better at times.  Reports pain in the left glut region, some in the lateral thigh, but mostly in the lower leg to the ankle.  Reports some tingling in left digits 1 and 2 of the foot.    Denies any significant back injuries.    Reports difficulty bending over and putting her sock on.    Medical Hx: HTN      Patient Occupation: Consultant-mainly desk Pain  Current pain rating: 3  At worst pain ratin  Location: left hip, lateral thigh and lower leg  Relieving factors: ice and heat (walking)  Aggravating factors: movement (walking long distances)  Progression: improved             Objective          Postural Observations    Additional Postural Observation Details  Normal sit to stand.  Patient ambulates with a minimal antalgic gait pattern with a guarded trunk.    Palpation     Additional Palpation Details  TTP to the left PSIS and glut region.    Active Range of Motion     Lumbar   Flexion: 98 degrees with pain  Extension: 29 degrees   Left lateral flexion: 24 degrees   Right lateral flexion: 24 degrees     Strength/Myotome Testing     Left Hip   Planes of Motion   Flexion: 4  Abduction: 5  Adduction: 5  External rotation: 4  Internal rotation:  4    Right Hip   Planes of Motion   Flexion: 4+  Abduction: 5  Adduction: 5  External rotation: 4+  Internal rotation: 4+    Left Knee   Extension: 4    Right Knee   Extension: 4+    Left Ankle/Foot   Dorsiflexion: 5    Right Ankle/Foot   Dorsiflexion: 5    Tests     Additional Tests Details  + Neural tension on the left  - SLR  + ANNMARIE on the left for hip pain          Assessment & Plan       Assessment  Impairments: abnormal gait, abnormal or restricted ROM, activity intolerance, impaired physical strength, lacks appropriate home exercise program and pain with function   Assessment details: Patient is a 59 year old female who presents with c/o pain, TTP, limited lumbar AROM, decreased strength positive special testing and an antalgic gait pattern which is limiting her ability to perform activities.  Barriers to therapy: none  Prognosis: good  Prognosis details: STG's to be met by 2 weeks  1)  Independent with HEP to show compliance  2)  Decrease pain by 50% or more to allow patient to perform self care and activities more comfortably  3)  AROM lumbar SB to 30 for improved trunk mobility with gait  4)  Min to no TTP present to indicate improved healing response  5)  Patient to ambulate with a normal gait pattern for improved ability to navigate in the community    LTG's to be met by 4 weeks  1)  Independent with HEP progression to show continued compliance  2)  Decrease pain by 75% or more to allow patient to return to activities and hobbies with minimal limitations   3)  Increase strength for the left LE to 4+/5 or more for improved stability with gait  4)  Negative special testing  5)  No TTP present to indicate improved healing response  6)  Patient to return to all activities without limitations         Plan  Therapy options: will be seen for skilled therapy services  Planned therapy interventions: strengthening, stretching, therapeutic activities, home exercise program, gait training and neuromuscular  re-education  Frequency: 2x week  Duration in weeks: 8  Treatment plan discussed with: patient            Timed:         Manual Therapy:    0     mins  52447;     Therapeutic Exercise:    8     mins  68382;     Neuromuscular Carter:    8    mins  10615;    Therapeutic Activity:     0     mins  72351;     Gait Trainin     mins  58318;     Ultrasound:     0     mins  48493;    Self Care  _7___ mins 04360        Un-Timed:  Electrical Stimulation:    0     mins  69140 ( );    Low Eval     17     Mins  70327  Mod Eval     0     Mins  19252  High Eval                       0     Mins  51300        Timed Treatment:   23   mins   Total Treatment:     40   mins          PT: Nick Francisco PT     [place] License [id number]  Electronically signed by Nick Francisco PT, 25, 8:01 AM EDT    Certification Period: 2025 thru 2025  I certify that the therapy services are furnished while this patient is under my care.  The services outlined above are required by this patient, and will be reviewed every 90 days.    Referring, [place] [address]   NPI:       Nick Francisco PT   License number: [id number]        Physician Signature:__________________________________________________    PHYSICIAN: Referring, Self      DATE:     Please sign and return via fax to [email or web address] . Thank you, [facility].

## 2025-06-26 ENCOUNTER — OFFICE VISIT (OUTPATIENT)
Dept: INTERNAL MEDICINE | Facility: CLINIC | Age: 60
End: 2025-06-26
Payer: COMMERCIAL

## 2025-06-26 VITALS
BODY MASS INDEX: 27.03 KG/M2 | DIASTOLIC BLOOD PRESSURE: 78 MMHG | WEIGHT: 168.2 LBS | SYSTOLIC BLOOD PRESSURE: 126 MMHG | HEIGHT: 66 IN

## 2025-06-26 DIAGNOSIS — E66.3 OVERWEIGHT (BMI 25.0-29.9): ICD-10-CM

## 2025-06-26 DIAGNOSIS — Z00.00 WELLNESS EXAMINATION: Primary | ICD-10-CM

## 2025-06-26 DIAGNOSIS — R73.03 PRE-DIABETES: ICD-10-CM

## 2025-06-26 DIAGNOSIS — K76.0 FATTY LIVER: ICD-10-CM

## 2025-06-26 DIAGNOSIS — M54.32 LEFT SIDED SCIATICA: ICD-10-CM

## 2025-06-26 DIAGNOSIS — R06.83 SNORING: ICD-10-CM

## 2025-06-26 DIAGNOSIS — I10 ESSENTIAL HYPERTENSION: Chronic | ICD-10-CM

## 2025-06-26 PROCEDURE — 99396 PREV VISIT EST AGE 40-64: CPT | Performed by: INTERNAL MEDICINE

## 2025-06-26 RX ORDER — SEMAGLUTIDE 0.5 MG/.5ML
0.5 INJECTION, SOLUTION SUBCUTANEOUS WEEKLY
Qty: 2 ML | Refills: 5 | Status: SHIPPED | OUTPATIENT
Start: 2025-06-26

## 2025-06-26 NOTE — PROGRESS NOTES
Subjective        Chief Complaint   Patient presents with    Annual Exam           Mariela Polanco is a 59 y.o. female who presents for    Patient Active Problem List   Diagnosis    Essential hypertension    Depression    Gastroesophageal reflux disease    Pre-diabetes    Diverticulosis    Ductal carcinoma in situ (DCIS) of right breast    S/P breast reconstruction, bilateral    Breast reconstruction deformity    BMI 27.0-27.9,adult    Snoring    Observed sleep apnea    Non-restorative sleep    Excessive daytime sleepiness    Personal history of breast cancer       History of Present Illness     She started PT for left sciatica this week. She has been checking her Bp and it has been up to 123/84. Denies chest pain, dyspnea, nausea, vomiting or abdominal pain. She had some more reconstructive surgery on the right two weeks ago. She has not taken wegovy b/c she could not get it filled.     Allergies   Allergen Reactions    Penicillins Other (See Comments)     Childhood allergy       Current Outpatient Medications on File Prior to Visit   Medication Sig Dispense Refill    amLODIPine (NORVASC) 10 MG tablet Take 1 tablet by mouth once daily 30 tablet 4    buPROPion XL (WELLBUTRIN XL) 300 MG 24 hr tablet Take 1 tablet by mouth once daily (Patient taking differently: Take 1 tablet by mouth Every Night.) 90 tablet 3    traZODone (DESYREL) 50 MG tablet TAKE 1 TABLET BY MOUTH ONCE DAILY AT NIGHT 90 tablet 0    [DISCONTINUED] Semaglutide-Weight Management (Wegovy) 0.5 MG/0.5ML solution auto-injector Inject 0.5 mL under the skin into the appropriate area as directed 1 (One) Time Per Week. 2 mL 5     No current facility-administered medications on file prior to visit.       Past Medical History:   Diagnosis Date    Depression     H/O Pneumonia     History of diverticulitis     Insomnia     Iron deficiency anemia 07/22/2019    Steatosis, liver     Vitamin D deficiency        Past Surgical History:   Procedure Laterality Date     BREAST BIOPSY Right     BREAST RECONSTRUCTION Bilateral 2023    Procedure: BREAST RECONSTRUCTION, bilateral breast reconstruction with implant, mesh, use of spy;  Surgeon: Maryse Wilkinson MD;  Location: [facility] OR;  Service: Plastics;  Laterality: Bilateral;    BREAST RECONSTRUCTION Bilateral 2023    Procedure: Bilateral Breast reconstruction revision, with bilateral capsulectomy, and bilateral nipple reconstruction;  Surgeon: Maryse Wilkinson MD;  Location: [facility];  Service: Plastics;  Laterality: Bilateral;    BREAST RECONSTRUCTION Right 2024    Procedure: BREAST RECONSTRUCTION REVISION, Right  Breast reconstruction revision right breast capsulotomy  and  implant exchange;  Surgeon: Maryse Wilkinson MD;  Location: [facility];  Service: Plastics;  Laterality: Right;    BREAST RECONSTRUCTION Right 2025    breast reconstruction revision     SECTION      CHOLECYSTECTOMY      COLONOSCOPY  10/28/2016    nl with Dr. Everett Botello    COLONOSCOPY N/A 2023    Procedure: COLONOSCOPY FOR SCREENING;  Surgeon: Dakota Nowak MD;  [place] [facility];  Service: Gastroenterology;  Laterality: N/A;  Diverticulosis, Hemorrhoids    ENDOSCOPY N/A 05/10/2021    Procedure: ESOPHAGOGASTRODUODENOSCOPY WITH BIOPSY;  Surgeon: Dakota Nowak MD;  [place] [facility];  Service: Gastroenterology;  Laterality: N/A;  GASTRIC POLYPS, SMALL HIATAL HERNIA, ESOPHAGITIS    FAT GRAFTING Bilateral 2023    Procedure: FAT GRAFTING;  Surgeon: Maryse Wilkinson MD;  Location: [facility];  Service: Plastics;  Laterality: Bilateral;    MASTECTOMY W/ SENTINEL NODE BIOPSY Right 2023    Procedure: bilateral breast skin sparing mastectomy with right sentinel lymph node biopsy.;  Surgeon: Miladis Mccormack MD;  Location: [facility] MAIN OR;  Service: General;  Laterality: Right;    UPPER GASTROINTESTINAL ENDOSCOPY         Family History   Problem  Relation Age of Onset    Other Mother         ESRD (end stage renal disease)    Heart failure Mother         chronic congestive heart failure    Diabetes Mother     Hyperlipidemia Mother     Hypertension Mother     Pneumonia Mother     Skin cancer Mother     Diverticulitis Father     Skin cancer Father     Colon polyps Father     Asthma Father     Diabetes Sister     Atrial fibrillation Sister     Diabetes Brother     Cancer Maternal Aunt         possibly ovarian    Crohn's disease Other     Breast cancer Neg Hx     Colon cancer Neg Hx     Irritable bowel syndrome Neg Hx     Ulcerative colitis Neg Hx     Malig Hyperthermia Neg Hx        Social History     Socioeconomic History    Marital status:    Tobacco Use    Smoking status: Never     Passive exposure: Never    Smokeless tobacco: Never   Vaping Use    Vaping status: Never Used   Substance and Sexual Activity    Alcohol use: Yes     Alcohol/week: 6.0 standard drinks of alcohol     Types: 6 Glasses of wine per week     Comment: 2 glasses of wine 2-3 times per week    Drug use: Never    Sexual activity: Yes     Partners: Male     Birth control/protection: Post-menopausal, Tubal ligation           The following portions of the patient's history were reviewed and updated as appropriate: problem list, allergies, current medications, past medical history, past family history, past social history, and past surgical history.    Review of Systems    Immunization History   Administered Date(s) Administered    COVID-19 (MODERNA) 12YRS+ (SPIKEVAX) 10/06/2024    COVID-19 (MODERNA) 1st,2nd,3rd Dose Monovalent 03/11/2021, 04/08/2021, 11/15/2021, 05/10/2022    COVID-19 (MODERNA) BIVALENT 12+YRS 10/31/2022    Flu Vaccine Intradermal Quad 18-64YR 11/27/2017, 11/12/2018, 09/15/2020    Flu Vaccine Quad PF >36MO 12/27/2019, 12/07/2021    Fluzone  >6mos 09/19/2024    Fluzone (or Fluarix & Flulaval for VFC) >6mos 12/27/2019    Hepatitis A 12/19/2017, 11/12/2018    Hepatitis B  "Adult/Adolescent IM 03/01/2018, 05/11/2018, 12/10/2018    Influenza, Unspecified 10/31/2022    Shingrix 12/07/2021    Tdap 11/27/2017    Typhoid, Unspecified 12/19/2017       Objective   Vitals:    06/26/25 0751   BP: 126/78   Weight: 76.3 kg (168 lb 3.2 oz)   Height: 167.6 cm (65.98\")     Body mass index is 27.16 kg/m².  Physical Exam  Vitals reviewed.   Constitutional:       Appearance: She is well-developed.   HENT:      Head: Normocephalic and atraumatic.      Mouth/Throat:      Mouth: Mucous membranes are moist.      Pharynx: Oropharynx is clear.   Eyes:      Extraocular Movements: Extraocular movements intact.      Conjunctiva/sclera: Conjunctivae normal.      Pupils: Pupils are equal, round, and reactive to light.   Neck:      Thyroid: No thyromegaly.      Vascular: No carotid bruit.   Cardiovascular:      Rate and Rhythm: Normal rate and regular rhythm.      Heart sounds: Normal heart sounds. No murmur heard.  Pulmonary:      Effort: Pulmonary effort is normal.      Breath sounds: Normal breath sounds.   Abdominal:      General: There is no distension.      Palpations: Abdomen is soft. There is no mass.      Tenderness: There is no abdominal tenderness. There is no rebound.   Musculoskeletal:      Cervical back: Neck supple.   Lymphadenopathy:      Cervical: No cervical adenopathy.   Skin:     General: Skin is warm.   Neurological:      Mental Status: She is alert.      Deep Tendon Reflexes:      Reflex Scores:       Patellar reflexes are 2+ on the right side and 2+ on the left side.       Achilles reflexes are 0 on the right side and 0 on the left side.     Comments: 5/5 strength in legs.    Negative SLR on the right. Positive on the left   Psychiatric:         Behavior: Behavior normal.         Procedures    Assessment & Plan   Diagnoses and all orders for this visit:    1. Wellness examination (Primary)    2. Essential hypertension  Comments:  Her Omron is accurate    3. Pre-diabetes  -     " Semaglutide-Weight Management (Wegovy) 0.5 MG/0.5ML solution auto-injector; Inject 0.5 mL under the skin into the appropriate area as directed 1 (One) Time Per Week.  Dispense: 2 mL; Refill: 5  -     Hemoglobin A1c; Future    4. Snoring  Comments:  She has tried two home sleep studies; she will call sleep med to see what next step is    5. Left sided sciatica  Comments:  started PT    6. Fatty liver  -     Comprehensive Metabolic Panel; Future  -     CBC & Differential; Future    7. BMI 27.0-27.9,adult  -     Semaglutide-Weight Management (Wegovy) 0.5 MG/0.5ML solution auto-injector; Inject 0.5 mL under the skin into the appropriate area as directed 1 (One) Time Per Week.  Dispense: 2 mL; Refill: 5    8. Overweight (BMI 25.0-29.9)  -     Semaglutide-Weight Management (Wegovy) 0.5 MG/0.5ML solution auto-injector; Inject 0.5 mL under the skin into the appropriate area as directed 1 (One) Time Per Week.  Dispense: 2 mL; Refill: 5               Cscope is UTD. Discussed exercising 150 min per week and getting second shingrix. Reviewed labs. Gave name of gyn for pap. Reviewed labs. Omron monitor is accurate.    Return in about 6 months (around 12/26/2025), or 30 minutes, for Lab Before FUP.

## 2025-06-27 ENCOUNTER — TREATMENT (OUTPATIENT)
Dept: PHYSICAL THERAPY | Facility: CLINIC | Age: 60
End: 2025-06-27
Payer: COMMERCIAL

## 2025-06-27 DIAGNOSIS — M54.32 SCIATICA, LEFT SIDE: ICD-10-CM

## 2025-06-27 DIAGNOSIS — M54.50 LUMBAR PAIN: Primary | ICD-10-CM

## 2025-06-27 NOTE — PROGRESS NOTES
Physical Therapy Daily Treatment Note  [facility]   [address]  [place] [address]  P: [phone]  F: [phone]    Patient: Mariela Polanco   : 1965  Referring practitioner: Self Referring  Date of Initial Visit: Type: THERAPY  Noted: 2025  Today's Date: 2025  Patient seen for 2 sessions       Visit Diagnoses:    ICD-10-CM ICD-9-CM   1. Lumbar pain  M54.50 724.2   2. Sciatica, left side  M54.32 724.3         Subjective     Mariela Polanco reports: Pt reports her back pain is 6/10. Goes all the way down my L leg. Currently, pain is mostly in L glute. Difficult to sleep.         Objective   See Exercise, Manual, and Modality Logs for complete treatment.       Assessment:  Pt demonstrates pain with twisting motions of spine, evident with subjective pain reports with supine piriformis stretch. Added gentle, isometric exercises to HEP for improved hip strength and lumbar stability. Pt will continue to benefit from skilled therapy, progressing lumbar extension based exercises as pt tolerates.         Plan:  Progress per Plan of Care            Timed:         Manual Therapy:         mins  92615;     Therapeutic Exercise:    15     mins  74592;     Neuromuscular Carter:    10    mins  53778;    Therapeutic Activity:     10     mins  22154;     Gait Training:           mins  06159;     Ultrasound:          mins  17874;    Ionto                                   mins  17940  Self Care                            mins  91677    Un-Timed:  Electrical Stimulation:         mins  15917 (MC );  Traction          mins 73501        Timed Treatment:   35   mins   Total Treatment:     35   mins      Kwaku Valente, Physical Therapist Assistant  [place] License #: [id number]

## 2025-07-01 ENCOUNTER — TREATMENT (OUTPATIENT)
Dept: PHYSICAL THERAPY | Facility: CLINIC | Age: 60
End: 2025-07-01
Payer: COMMERCIAL

## 2025-07-01 ENCOUNTER — TELEPHONE (OUTPATIENT)
Dept: PLASTIC SURGERY | Facility: CLINIC | Age: 60
End: 2025-07-01
Payer: COMMERCIAL

## 2025-07-01 DIAGNOSIS — M54.50 LUMBAR PAIN: Primary | ICD-10-CM

## 2025-07-01 DIAGNOSIS — M54.32 SCIATICA, LEFT SIDE: ICD-10-CM

## 2025-07-01 NOTE — PROGRESS NOTES
Physical Therapy Daily Treatment Note  [address], [address]  [place] [address]      Patient: Mariela Polanco   : 1965  Referring practitioner: Self Referring  Date of Initial Visit: Type: THERAPY  Noted: 2025  Today's Date: 2025  Patient seen for 3 sessions       Visit Diagnoses:    ICD-10-CM ICD-9-CM   1. Lumbar pain  M54.50 724.2   2. Sciatica, left side  M54.32 724.3           Subjective   Patient reports that the leg is killing her, woke up with pain rated at 8-9/10.    Objective   See Exercise, Manual, and Modality Logs for complete treatment.       Assessment/Plan  Subjective reports are elevated as compared with previous visits.  Added seated nerve glide to help with the symptoms in the left leg.  Added glut sets, hip abd in sidelying and clams in sidelying for hip/glut strength which will improve lumbar stability.  Continued with the KT to help the symptoms.  Discussed the rehab process moving forward with regard to the symptoms in the leg.  Patient had visible weakness in the left hip abductors, but no pain associated with it.  Patient performed the routine without significant reports of pain and or increase in symptoms in the LE.      Timed:         Manual Therapy:    0     mins  22571;     Therapeutic Exercise:    28     mins  18683;     Neuromuscular Carter:    8    mins  81280;    Therapeutic Activity:     0     mins  75156;     Gait Training      0    mins  31585;  Work Conditioning     0   mins  54763   Self Care  __8___ mins 31968    Untimed:  Electrical Stimulation:    0     mins  30546 ( );      Timed Treatment:   44   mins   Total Treatment:     44   mins    Nick Francisco, PT  [place] License: [id number]

## 2025-07-03 ENCOUNTER — TREATMENT (OUTPATIENT)
Dept: PHYSICAL THERAPY | Facility: CLINIC | Age: 60
End: 2025-07-03
Payer: COMMERCIAL

## 2025-07-03 DIAGNOSIS — M54.50 LUMBAR PAIN: Primary | ICD-10-CM

## 2025-07-03 DIAGNOSIS — M54.32 SCIATICA, LEFT SIDE: ICD-10-CM

## 2025-07-03 NOTE — PROGRESS NOTES
Physical Therapy Daily Treatment Note  [address] [facility] [facility] [address], [address]  [place] [address]      Patient: Mariela Polanco   : 1965  Referring practitioner: Self Referring  Date of Initial Visit: Type: THERAPY  Noted: 2025  Today's Date: 7/3/2025  Patient seen for 4 sessions       Visit Diagnoses:    ICD-10-CM ICD-9-CM   1. Lumbar pain  M54.50 724.2   2. Sciatica, left side  M54.32 724.3           Subjective   Patient reports that the back and leg are a little better, currently rates the pain at 4-10.    Objective   See Exercise, Manual, and Modality Logs for complete treatment.   Added bridges.    Assessment/Plan  Subjective reports are improved from the last visit (8-9/10).  Held the KT secondary to it still being on from the last visit.  Added bridges for glut strength which will improve lumbar stability.  Patient performed the exercise routine without significant pain in the lumbar spine or left LE.  Plan to progress the sore stabilization exercises as the patient can tolerate.  Discussed the rehab process moving forward and the possibility of the lumbar pain increasing as the LE pain centralizes.      Timed:         Manual Therapy:    0     mins  86225;     Therapeutic Exercise:    23     mins  16565;     Neuromuscular Carter:    0    mins  41558;    Therapeutic Activity:     0     mins  50121;     Gait Training      0    mins  16227;  Work Conditioning     0   mins  58368   Self Care  ___8__ mins 40531      Untimed:  Electrical Stimulation:    0     mins  76095 ( );      Timed Treatment:   31   mins   Total Treatment:     31   mins    Nick Francisco, PT  [place] License: [id number]

## 2025-07-07 ENCOUNTER — TELEPHONE (OUTPATIENT)
Dept: PLASTIC SURGERY | Facility: CLINIC | Age: 60
End: 2025-07-07
Payer: COMMERCIAL

## 2025-07-07 ENCOUNTER — TREATMENT (OUTPATIENT)
Dept: PHYSICAL THERAPY | Facility: CLINIC | Age: 60
End: 2025-07-07
Payer: COMMERCIAL

## 2025-07-07 DIAGNOSIS — M54.50 LUMBAR PAIN: Primary | ICD-10-CM

## 2025-07-07 DIAGNOSIS — M54.32 SCIATICA, LEFT SIDE: ICD-10-CM

## 2025-07-07 PROCEDURE — 97110 THERAPEUTIC EXERCISES: CPT | Performed by: PHYSICAL THERAPIST

## 2025-07-07 PROCEDURE — 97535 SELF CARE MNGMENT TRAINING: CPT | Performed by: PHYSICAL THERAPIST

## 2025-07-07 PROCEDURE — 97112 NEUROMUSCULAR REEDUCATION: CPT | Performed by: PHYSICAL THERAPIST

## 2025-07-07 NOTE — TELEPHONE ENCOUNTER
I called pt to reschedule her apt on 07/14/25. She asked that I send her available apts on [address] because she was driving.

## 2025-07-07 NOTE — PROGRESS NOTES
Physical Therapy Daily Treatment Note  [address] [facility] [facility], [address]  [place] [address]      Patient: Mariela Polanco   : 1965  Referring practitioner: Self Referring  Date of Initial Visit: Type: THERAPY  Noted: 2025  Today's Date: 2025  Patient seen for 5 sessions       Visit Diagnoses:    ICD-10-CM ICD-9-CM   1. Lumbar pain  M54.50 724.2   2. Sciatica, left side  M54.32 724.3           Subjective   Patient reports pain rated at 3/10 and states that the symptoms in her leg aren't as intense.    Objective          Postural Observations    Additional Postural Observation Details  Patient ambulates with a normal gait pattern.      See Exercise, Manual, and Modality Logs for complete treatment.   Added PPT with steps and single leg RDL.    Assessment/Plan  Subjective reports are improved from the last visit (-5/10).  Subjective reports are improving with regard to the intensity of the LE parasthesias.  Continued with the KT to help the symptoms.  Added PPT with steps and single leg RDL for abdominal/glut strengthening which will improve lumbar stability.  Discussed exercises to do on an upcoming plane trip to help with the symptoms in the leg and lumbar spine.  Patient has more AROM with left sidelying hip abd indicating an increase in strength.  Patient performed the routine without reports of pain.      Timed:         Manual Therapy:    0     mins  74972;     Therapeutic Exercise:    8     mins  97287;     Neuromuscular Carter:    8    mins  38476;    Therapeutic Activity:     0     mins  20037;     Gait Training      0    mins  40313;  Work Conditioning     0   mins  58851   Self Care  __8___ mins 57891    Untimed:  Electrical Stimulation:    0     mins  05233 ( );      Timed Treatment:   24   mins   Total Treatment:     31   mins    Nick Francisco, PT  [place] License: [id number]

## 2025-07-09 ENCOUNTER — TELEPHONE (OUTPATIENT)
Dept: PLASTIC SURGERY | Facility: CLINIC | Age: 60
End: 2025-07-09
Payer: COMMERCIAL

## 2025-07-09 NOTE — TELEPHONE ENCOUNTER
I left message for pt to call and r/s apt on 07/14/25 due to Dr Wilkinson being out of the office.

## 2025-07-14 ENCOUNTER — TREATMENT (OUTPATIENT)
Dept: PHYSICAL THERAPY | Facility: CLINIC | Age: 60
End: 2025-07-14
Payer: COMMERCIAL

## 2025-07-14 DIAGNOSIS — M54.32 SCIATICA, LEFT SIDE: ICD-10-CM

## 2025-07-14 DIAGNOSIS — M54.50 LUMBAR PAIN: Primary | ICD-10-CM

## 2025-07-14 PROCEDURE — 97112 NEUROMUSCULAR REEDUCATION: CPT | Performed by: PHYSICAL THERAPIST

## 2025-07-14 PROCEDURE — 97110 THERAPEUTIC EXERCISES: CPT | Performed by: PHYSICAL THERAPIST

## 2025-07-14 PROCEDURE — 97530 THERAPEUTIC ACTIVITIES: CPT | Performed by: PHYSICAL THERAPIST

## 2025-07-14 NOTE — PROGRESS NOTES
Physical Therapy Daily Treatment Note  [facility] [place]   [address]  [place] [address]  P: [phone]  F: [phone]    Patient: Mariela Polanco   : 1965  Referring practitioner: Self Referring  Date of Initial Visit: Type: THERAPY  Noted: 2025  Today's Date: 2025  Patient seen for 6 sessions       Visit Diagnoses:    ICD-10-CM ICD-9-CM   1. Lumbar pain  M54.50 724.2   2. Sciatica, left side  M54.32 724.3         Subjective     Mariela Polanco reports: Back is feeling better. Still have a little pain going down the leg, but much better than it was.         Objective   See Exercise, Manual, and Modality Logs for complete treatment.       Assessment:  Subjective reports of radicular s/s going down L LE are improving with progressive strengthening program. Pt performed all interventions this visit, without exacerbation of LBP s/s. Added single leg RDL for improved hamstring strength and single leg stability. Ktape reapplied, secondary to pt reporting improved lumbar stability with application.         Plan:  Progress per Plan of Care            Timed:         Manual Therapy:         mins  00489;     Therapeutic Exercise:    18     mins  68541;     Neuromuscular Carter:    14    mins  08930;    Therapeutic Activity:     15     mins  23836;     Gait Training:           mins  82211;     Ultrasound:          mins  44654;    Ionto                                   mins  90268  Self Care                            mins  69533    Un-Timed:  Electrical Stimulation:         mins  55964 (MC );  Traction          mins 23842        Timed Treatment:   47   mins   Total Treatment:     47   mins      Kwaku Valente, Physical Therapist Assistant  [place] License #: [id number]

## 2025-07-21 ENCOUNTER — TREATMENT (OUTPATIENT)
Dept: PHYSICAL THERAPY | Facility: CLINIC | Age: 60
End: 2025-07-21
Payer: COMMERCIAL

## 2025-07-21 DIAGNOSIS — M54.50 LUMBAR PAIN: Primary | ICD-10-CM

## 2025-07-21 DIAGNOSIS — M54.32 SCIATICA, LEFT SIDE: ICD-10-CM

## 2025-07-21 PROCEDURE — 97110 THERAPEUTIC EXERCISES: CPT | Performed by: PHYSICAL THERAPIST

## 2025-07-21 PROCEDURE — 97530 THERAPEUTIC ACTIVITIES: CPT | Performed by: PHYSICAL THERAPIST

## 2025-07-21 PROCEDURE — 97112 NEUROMUSCULAR REEDUCATION: CPT | Performed by: PHYSICAL THERAPIST

## 2025-07-21 NOTE — PROGRESS NOTES
Physical Therapy Daily Treatment Note  [address] [facility], [address]  [place] [address]      Patient: Mariela Polanco   : 1965  Referring practitioner: Self Referring  Date of Initial Visit: Type: THERAPY  Noted: 2025  Today's Date: 2025  Patient seen for 7 sessions       Visit Diagnoses:    ICD-10-CM ICD-9-CM   1. Lumbar pain  M54.50 724.2   2. Sciatica, left side  M54.32 724.3           Subjective   Patient reports that the back is a lot better, reports a little tightness in the left hip.  Rates the pain at 2/10.    Objective   See Exercise, Manual, and Modality Logs for complete treatment.   Added prone opp arm/leg and shlder ext with the band.    Assessment/Plan  Subjective reports are much improved since beginning PT.  Continued with the KT to help the symptoms.  Added prone opp arm/leg for extensor strengthening and shlder extension with the band for abdominal strengthening which will improve lumbar stability with lifting and functional tasks.  Patient performed the exercise routine without reports of pain in the lumbar spine.      Timed:         Manual Therapy:    0     mins  81012;     Therapeutic Exercise:    23     mins  89645;     Neuromuscular Carter:    8    mins  62883;    Therapeutic Activity:     8     mins  62469;     Gait Training      0    mins  59829;  Work Conditioning     0   mins  91329       Untimed:  Electrical Stimulation:    0     mins  15730 ( );      Timed Treatment:   39   mins   Total Treatment:     39   mins    Nick Francisco, PT  [place] License: [id number]

## 2025-07-24 ENCOUNTER — TREATMENT (OUTPATIENT)
Dept: PHYSICAL THERAPY | Facility: CLINIC | Age: 60
End: 2025-07-24
Payer: COMMERCIAL

## 2025-07-24 DIAGNOSIS — M54.50 LUMBAR PAIN: Primary | ICD-10-CM

## 2025-07-24 DIAGNOSIS — M54.32 SCIATICA, LEFT SIDE: ICD-10-CM

## 2025-07-24 NOTE — PROGRESS NOTES
Physical Therapy Daily Treatment Note  [address] [facility], [address]  [place] [address]      Patient: Mariela Polanco   : 1965  Referring practitioner: Self Referring  Date of Initial Visit: Type: THERAPY  Noted: 2025  Today's Date: 2025  Patient seen for 8 sessions       Visit Diagnoses:    ICD-10-CM ICD-9-CM   1. Lumbar pain  M54.50 724.2   2. Sciatica, left side  M54.32 724.3           Subjective   Patient reports that the back is better, currently denies pain.  Denies any symptoms down the leg.    Objective          Active Range of Motion     Lumbar   Flexion: 109 degrees   Extension: 31 degrees   Left lateral flexion: 25 degrees   Right lateral flexion: 25 degrees       See Exercise, Manual, and Modality Logs for complete treatment.       Assessment/Plan  Subjective reports continue to be much improved since beginning PT.  Function is improved as evident by her ability to perform activities without limitations.  Continued with the KT secondary to the patient noting an improvement with it on.  Added suitcase carry for oblique strengthening which will improve lumbar stability.  Patient will continue PT 1 time a week for the next 1-2 weeks, then likely be D/C'ed to [facility].  AROM has imporved in all planes since beginning PT.  Patient tolerated the increase in the strengthening exercises without reports of pain.      Timed:         Manual Therapy:    0     mins  65778;     Therapeutic Exercise:    15     mins  68565;     Neuromuscular Carter:    8    mins  48752;    Therapeutic Activity:     8     mins  17462;     Gait Training      0    mins  93316;  Work Conditioning     0   mins  31803       Untimed:  Electrical Stimulation:    0     mins  51611 ( );      Timed Treatment:   31   mins   Total Treatment:     31   mins    Nick Francisco, PT  [place] License: [id number]

## 2025-08-08 ENCOUNTER — E-VISIT (OUTPATIENT)
Dept: FAMILY MEDICINE CLINIC | Facility: TELEHEALTH | Age: 60
End: 2025-08-08
Payer: COMMERCIAL

## 2025-08-08 PROCEDURE — FABRICHEALTHVISIT: Performed by: NURSE PRACTITIONER

## 2025-08-13 DIAGNOSIS — I10 ESSENTIAL HYPERTENSION: Chronic | ICD-10-CM

## 2025-08-13 DIAGNOSIS — G47.00 INSOMNIA, UNSPECIFIED TYPE: ICD-10-CM

## 2025-08-13 DIAGNOSIS — F32.0 CURRENT MILD EPISODE OF MAJOR DEPRESSIVE DISORDER WITHOUT PRIOR EPISODE: ICD-10-CM

## 2025-08-14 RX ORDER — BUPROPION HYDROCHLORIDE 300 MG/1
300 TABLET ORAL DAILY
Qty: 90 TABLET | Refills: 2 | Status: SHIPPED | OUTPATIENT
Start: 2025-08-14

## 2025-08-14 RX ORDER — AMLODIPINE BESYLATE 10 MG/1
10 TABLET ORAL DAILY
Qty: 90 TABLET | Refills: 2 | Status: SHIPPED | OUTPATIENT
Start: 2025-08-14

## 2025-08-14 RX ORDER — TRAZODONE HYDROCHLORIDE 50 MG/1
50 TABLET ORAL NIGHTLY
Qty: 90 TABLET | Refills: 0 | Status: SHIPPED | OUTPATIENT
Start: 2025-08-14

## (undated) DEVICE — CVR TRANSD CIV FLX TPR 11.9 TO 3.8X61CM

## (undated) DEVICE — MARKER,SKIN,WI/RULER AND LABELS: Brand: MEDLINE

## (undated) DEVICE — SUT ETHLN 3/0 PC5 18IN 1893G

## (undated) DEVICE — ANTIBACTERIAL UNDYED BRAIDED (POLYGLACTIN 910), SYNTHETIC ABSORBABLE SUTURE: Brand: COATED VICRYL

## (undated) DEVICE — GLV SURG BIOGEL LTX PF 5 1/2

## (undated) DEVICE — APPL CHLORAPREP HI/LITE 26ML ORNG

## (undated) DEVICE — STERILE POLYISOPRENE POWDER-FREE SURGICAL GLOVES WITH EMOLLIENT COATING: Brand: PROTEXIS

## (undated) DEVICE — SUT MNCRYL PLS ANTIB UD 4/0 PS2 18IN

## (undated) DEVICE — GLV SURG BIOGEL LTX PF 6 1/2

## (undated) DEVICE — PLASTIC MAJOR-LF: Brand: MEDLINE INDUSTRIES, INC.

## (undated) DEVICE — ADHS SKIN SURG TISS VISC PREMIERPRO EXOFIN HI/VISC FAST/DRY

## (undated) DEVICE — CONTAINER,SPECIMEN,OR STERILE,4OZ: Brand: MEDLINE

## (undated) DEVICE — DEV DEL BRST/IMP GEL KELLER2 FUNNEL EA/5

## (undated) DEVICE — INTENDED FOR TISSUE SEPARATION, AND OTHER PROCEDURES THAT REQUIRE A SHARP SURGICAL BLADE TO PUNCTURE OR CUT.: Brand: BARD-PARKER ® STAINLESS STEEL BLADES

## (undated) DEVICE — Device: Brand: MICROAIRE®

## (undated) DEVICE — SPNG LAP 18X18IN LF STRL PK/5

## (undated) DEVICE — STANDARD HYPODERMIC NEEDLE,POLYPROPYLENE HUB: Brand: MONOJECT

## (undated) DEVICE — GOWN ISOL W/THUMB UNIV BLU BX/15

## (undated) DEVICE — SLV SCD KN/LEN ADJ EXPRSS BLENDED MD 1P/U

## (undated) DEVICE — MSK ENDO PORT O2 POM ELITE CURAPLEX A/

## (undated) DEVICE — SUT VIC 3/0 SH 27IN J416H

## (undated) DEVICE — TOWEL,OR,DSP,ST,BLUE,STD,4/PK,20PK/CS: Brand: MEDLINE

## (undated) DEVICE — LEGGINGS, PAIR, 31X48, STERILE: Brand: MEDLINE

## (undated) DEVICE — Device

## (undated) DEVICE — BIOPATCH™ ANTIMICROBIAL DRESSING WITH CHLORHEXIDINE GLUCONATE IS A HYDROPHILLIC POLYURETHANE ABSORPTIVE FOAM WITH CHLORHEXIDINE GLUCONATE (CHG) WHICH INHIBITS BACTERIAL GROWTH UNDER THE DRESSING. THE DRESSING IS INTENDED TO BE USED TO ABSORB EXUDATE, COVER A WOUND CAUSED BY VASCULAR AND NONVASCULAR PERCUTANEOUS MEDICAL DEVICES DURING SURGERY, AS WELL AS REDUCE LOCAL INFECTION AND COLONIZATION OF MICROORGANISMS.: Brand: BIOPATCH

## (undated) DEVICE — GLV SURG SENSICARE SLT PF LF 5.5 STRL

## (undated) DEVICE — 450 ML BOTTLE OF 0.05% CHLORHEXIDINE GLUCONATE IN 99.95% STERILE WATER FOR IRRIGATION, USP AND APPLICATOR.: Brand: IRRISEPT ANTIMICROBIAL WOUND LAVAGE

## (undated) DEVICE — SYR LL TP 10ML STRL

## (undated) DEVICE — ASPIRATION TUBING SET, DISPOSABLE: Brand: MICROAIRE®

## (undated) DEVICE — RETRACTOR 175X30MM WITH BUILT-IN SINGLE-USE MULTI-LED LIGHT SOURCE - STERILE: Brand: ONETRAC LX

## (undated) DEVICE — VIAL FORMLN CAP 10PCT 20ML

## (undated) DEVICE — FLEX ADVANTAGE 1500CC: Brand: FLEX ADVANTAGE

## (undated) DEVICE — DRSNG SURESITE WNDW 4X4.5

## (undated) DEVICE — GLV SURG SENSICARE SLT PF LF 6.5 STRL

## (undated) DEVICE — MARKR SKIN W/RULR AND LBL

## (undated) DEVICE — SUT PDS 2/0 SH 27IN Z317H

## (undated) DEVICE — KT DRP SPY/PHI W/ICG 25MG STRL

## (undated) DEVICE — SYR LUERLOK 30CC

## (undated) DEVICE — CVR PROB GEN PURP W ISOSILK 6X48

## (undated) DEVICE — GOWN ,SIRUS,NONREINFORCED 3XL: Brand: MEDLINE

## (undated) DEVICE — PENCL SMOKE/EVAC MEGADYNE TELESCP 10FT

## (undated) DEVICE — PROXIMATE RH ROTATING HEAD SKIN STAPLERS (35 WIDE) CONTAINS 35 STAINLESS STEEL STAPLES: Brand: PROXIMATE

## (undated) DEVICE — SYS CLS SKIN PREMIERPRO EXOFINFUSION 22CM

## (undated) DEVICE — PK UNIV COMPL 40

## (undated) DEVICE — INTENDED FOR TISSUE SEPARATION, AND OTHER PROCEDURES THAT REQUIRE A SHARP SURGICAL BLADE TO PUNCTURE OR CUT.: Brand: BARD-PARKER ® CARBON RIB-BACK BLADES

## (undated) DEVICE — SINGLE-USE BIOPSY FORCEPS: Brand: RADIAL JAW 4

## (undated) DEVICE — JACKSON-PRATT 100CC BULB RESERVOIR: Brand: CARDINAL HEALTH

## (undated) DEVICE — SYR CONTRL PRESS/LO FIX/M/LL W/THMB/RNG 10ML

## (undated) DEVICE — GLOVE,SURG,SENSICARE SLT,LF,PF,5.5: Brand: MEDLINE

## (undated) DEVICE — TRAP FLD MINIVAC MEGADYNE 100ML

## (undated) DEVICE — TBG PENCL TELESCP MEGADYNE SMOKE EVAC 10FT

## (undated) DEVICE — NON-WOVEN ADHESIVE WOUND DRESSING: Brand: PRIMAPORE ADHESIVE WOUND DRSG 7.2*5CM

## (undated) DEVICE — EXTRCT STPL SKIN STRL BX/12

## (undated) DEVICE — KT ORCA ORCAPOD DISP STRL

## (undated) DEVICE — SYR LUERLOK 5CC

## (undated) DEVICE — STPLR SKIN VISISTAT WD 35CT

## (undated) DEVICE — TOTAL TRAY, 16FR 10ML SIL FOLEY, URN: Brand: MEDLINE

## (undated) DEVICE — DRSNG SURESITE HNDL 4X5

## (undated) DEVICE — SUT VIC 3/0 TIES 18IN J110T

## (undated) DEVICE — PATIENT RETURN ELECTRODE, SINGLE-USE, CONTACT QUALITY MONITORING, ADULT, WITH 9FT CORD, FOR PATIENTS WEIGING OVER 33LBS. (15KG): Brand: MEGADYNE

## (undated) DEVICE — SYR CATH/TIP 50ML 2OZ STRL 1P/U

## (undated) DEVICE — NDL HYPO PRECISIONGLIDE REG 25G 1 1/2

## (undated) DEVICE — SYS FAT GRAFTING REVOLVE SGL

## (undated) DEVICE — BLAKE SILICONE DRAIN, 15 FR ROUND, HUBLESS WITH 3/16" TROCAR: Brand: BLAKE

## (undated) DEVICE — SZR BRST SALN STL68 H/P 500/540CC

## (undated) DEVICE — BITEBLOCK OMNI BLOC

## (undated) DEVICE — STPLR SKIN SUBCUTICULAR INSORB 2030

## (undated) DEVICE — GAUZE,SPONGE,FLUFF,6"X6.75",STRL,10/TRAY: Brand: MEDLINE

## (undated) DEVICE — STCKNT IMPERV 9X36IN STRL

## (undated) DEVICE — CANN NASL CO2 TRULINK W/O2 A/

## (undated) DEVICE — SUT SILK 2/0 SH 30IN K833H

## (undated) DEVICE — INFILTRATION TUBING SET: Brand: SINGLE SPIKE INFILTRATION TUBING

## (undated) DEVICE — PENCL E/S SMOKEEVAC W/TELESCP CANN